# Patient Record
Sex: FEMALE | Race: WHITE
[De-identification: names, ages, dates, MRNs, and addresses within clinical notes are randomized per-mention and may not be internally consistent; named-entity substitution may affect disease eponyms.]

---

## 2018-05-11 ENCOUNTER — HOSPITAL ENCOUNTER (EMERGENCY)
Dept: HOSPITAL 47 - EC | Age: 68
Discharge: HOME | End: 2018-05-11
Payer: MEDICARE

## 2018-05-11 VITALS
SYSTOLIC BLOOD PRESSURE: 143 MMHG | TEMPERATURE: 98.9 F | HEART RATE: 92 BPM | RESPIRATION RATE: 20 BRPM | DIASTOLIC BLOOD PRESSURE: 87 MMHG

## 2018-05-11 DIAGNOSIS — Z87.891: ICD-10-CM

## 2018-05-11 DIAGNOSIS — Z79.82: ICD-10-CM

## 2018-05-11 DIAGNOSIS — Z88.1: ICD-10-CM

## 2018-05-11 DIAGNOSIS — Z95.818: ICD-10-CM

## 2018-05-11 DIAGNOSIS — Z79.899: ICD-10-CM

## 2018-05-11 DIAGNOSIS — Z79.84: ICD-10-CM

## 2018-05-11 DIAGNOSIS — I25.10: ICD-10-CM

## 2018-05-11 DIAGNOSIS — B02.9: Primary | ICD-10-CM

## 2018-05-11 DIAGNOSIS — Z95.5: ICD-10-CM

## 2018-05-11 PROCEDURE — 99282 EMERGENCY DEPT VISIT SF MDM: CPT

## 2018-05-11 NOTE — ED
Skin/Abscess/FB HPI





- General


Chief complaint: Skin/Abscess/Foreign Body


Stated complaint: Rash


Time Seen by Provider: 05/11/18 14:42


Source: patient


Mode of arrival: ambulatory


Limitations: no limitations





- History of Present Illness


Initial comments: 





Patient presents with a rash on the left side of her chest and back.  It has 

gotten worse for couple days it is uncomfortable.  She has taken no medication 

this.  She has no fever or chills.  It does not occur anywhere else.





- Related Data


 Home Medications











 Medication  Instructions  Recorded  Confirmed


 


Aspirin EC [Ecotrin] 325 mg PO DAILY 05/11/18 05/11/18


 


Lisinopril [Zestril] 5 mg PO DAILY 05/11/18 05/11/18


 


Metoprolol Tartrate [Lopressor] 25 mg PO BID 05/11/18 05/11/18


 


diphenhydrAMINE HCL [Benadryl] 25 mg PO BID PRN 05/11/18 05/11/18


 


metFORMIN HCL 1,000 mg PO BID 05/11/18 05/11/18








 Previous Rx's











 Medication  Instructions  Recorded


 


Acyclovir [Zovirax] 800 mg PO TID #30 tab 05/11/18











 Allergies











Allergy/AdvReac Type Severity Reaction Status Date / Time


 


tetracycline Allergy  Rash/Hives Verified 05/11/18 14:48














Review of Systems


ROS Statement: 


Those systems with pertinent positive or pertinent negative responses have been 

documented in the HPI.





ROS Other: All systems not noted in ROS Statement are negative.





Past Medical History


Past Medical History: Coronary Artery Disease (CAD), Chest Pain / Angina


History of Any Multi-Drug Resistant Organisms: None Reported


Past Surgical History: Heart Catheterization, Heart Catheterization With Stent


Past Psychological History: No Psychological Hx Reported


Smoking Status: Former smoker


Past Alcohol Use History: None Reported


Past Drug Use History: None Reported





General Exam


Limitations: no limitations


General appearance: alert, in no apparent distress


Head exam: Present: atraumatic


Eye exam: Present: normal appearance


Neck exam: Present: normal inspection


Respiratory exam: Absent: respiratory distress


Extremities exam: Present: normal inspection, full ROM


Neurological exam: Present: alert, oriented X3


Psychiatric exam: Present: normal affect


Skin exam: Present: other (Shingles rash on the left chest and back)





Course





 Vital Signs











  05/11/18





  14:32


 


Temperature 98.9 F


 


Pulse Rate 92


 


Respiratory 20





Rate 


 


Blood Pressure 143/87


 


O2 Sat by Pulse 97





Oximetry 














Medical Decision Making





- Medical Decision Making





Patient presents with a rash.  It appears to be shingles.  She will be treated 

with pain medication and acyclovir.  She is stable for discharge and outpatient 

follow-up.





Disposition


Clinical Impression: 


 Shingles





Disposition: HOME SELF-CARE


Condition: Good


Instructions:  Shingles (ED)


Prescriptions: 


Acyclovir [Zovirax] 800 mg PO TID #30 tab


Is patient prescribed a controlled substance at d/c from ED?: No


Referrals: 


None,Stated [Primary Care Provider] - 1-2 days


Time of Disposition: 14:55

## 2019-01-13 ENCOUNTER — HOSPITAL ENCOUNTER (INPATIENT)
Dept: HOSPITAL 47 - EC | Age: 69
LOS: 4 days | Discharge: HOME | DRG: 280 | End: 2019-01-17
Attending: HOSPITALIST | Admitting: HOSPITALIST
Payer: MEDICARE

## 2019-01-13 VITALS — BODY MASS INDEX: 32.9 KG/M2

## 2019-01-13 DIAGNOSIS — T50.8X5A: ICD-10-CM

## 2019-01-13 DIAGNOSIS — I13.0: ICD-10-CM

## 2019-01-13 DIAGNOSIS — Z95.5: ICD-10-CM

## 2019-01-13 DIAGNOSIS — R32: ICD-10-CM

## 2019-01-13 DIAGNOSIS — Z79.899: ICD-10-CM

## 2019-01-13 DIAGNOSIS — I21.4: Primary | ICD-10-CM

## 2019-01-13 DIAGNOSIS — Z82.49: ICD-10-CM

## 2019-01-13 DIAGNOSIS — E11.51: ICD-10-CM

## 2019-01-13 DIAGNOSIS — I25.5: ICD-10-CM

## 2019-01-13 DIAGNOSIS — I25.10: ICD-10-CM

## 2019-01-13 DIAGNOSIS — N14.1: ICD-10-CM
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
## 2019-01-13 DIAGNOSIS — E66.9: ICD-10-CM

## 2019-01-13 DIAGNOSIS — Z88.1: ICD-10-CM

## 2019-01-13 DIAGNOSIS — E87.2: ICD-10-CM

## 2019-01-13 DIAGNOSIS — K80.20: ICD-10-CM

## 2019-01-13 DIAGNOSIS — I82.813: ICD-10-CM

## 2019-01-13 DIAGNOSIS — N18.3: ICD-10-CM

## 2019-01-13 DIAGNOSIS — I50.23: ICD-10-CM

## 2019-01-13 DIAGNOSIS — Z79.82: ICD-10-CM

## 2019-01-13 DIAGNOSIS — F17.201: ICD-10-CM

## 2019-01-13 DIAGNOSIS — Z79.84: ICD-10-CM

## 2019-01-13 DIAGNOSIS — E11.22: ICD-10-CM

## 2019-01-13 LAB
ALBUMIN SERPL-MCNC: 3.8 G/DL (ref 3.5–5)
ALP SERPL-CCNC: 89 U/L (ref 38–126)
ALT SERPL-CCNC: 18 U/L (ref 9–52)
ANION GAP SERPL CALC-SCNC: 9 MMOL/L
APTT BLD: 21.7 SEC (ref 22–30)
AST SERPL-CCNC: 17 U/L (ref 14–36)
BASOPHILS # BLD AUTO: 0.1 K/UL (ref 0–0.2)
BASOPHILS NFR BLD AUTO: 1 %
BUN SERPL-SCNC: 27 MG/DL (ref 7–17)
CALCIUM SPEC-MCNC: 9.4 MG/DL (ref 8.4–10.2)
CHLORIDE SERPL-SCNC: 108 MMOL/L (ref 98–107)
CK SERPL-CCNC: 56 U/L (ref 30–135)
CO2 SERPL-SCNC: 21 MMOL/L (ref 22–30)
EOSINOPHIL # BLD AUTO: 0.2 K/UL (ref 0–0.7)
EOSINOPHIL NFR BLD AUTO: 2 %
ERYTHROCYTE [DISTWIDTH] IN BLOOD BY AUTOMATED COUNT: 3.58 M/UL (ref 3.8–5.4)
ERYTHROCYTE [DISTWIDTH] IN BLOOD: 14.8 % (ref 11.5–15.5)
GLUCOSE SERPL-MCNC: 156 MG/DL (ref 74–99)
HCT VFR BLD AUTO: 31.7 % (ref 34–46)
HGB BLD-MCNC: 10.2 GM/DL (ref 11.4–16)
INR PPP: 1 (ref ?–1.2)
LYMPHOCYTES # SPEC AUTO: 1.5 K/UL (ref 1–4.8)
LYMPHOCYTES NFR SPEC AUTO: 20 %
MCH RBC QN AUTO: 28.4 PG (ref 25–35)
MCHC RBC AUTO-ENTMCNC: 32.2 G/DL (ref 31–37)
MCV RBC AUTO: 88.3 FL (ref 80–100)
MONOCYTES # BLD AUTO: 0.4 K/UL (ref 0–1)
MONOCYTES NFR BLD AUTO: 6 %
NEUTROPHILS # BLD AUTO: 5.2 K/UL (ref 1.3–7.7)
NEUTROPHILS NFR BLD AUTO: 68 %
PLATELET # BLD AUTO: 311 K/UL (ref 150–450)
POTASSIUM SERPL-SCNC: 5 MMOL/L (ref 3.5–5.1)
PROT SERPL-MCNC: 7.1 G/DL (ref 6.3–8.2)
PT BLD: 10.4 SEC (ref 9–12)
SODIUM SERPL-SCNC: 138 MMOL/L (ref 137–145)
TROPONIN I SERPL-MCNC: 0.61 NG/ML (ref 0–0.03)
WBC # BLD AUTO: 7.6 K/UL (ref 3.8–10.6)

## 2019-01-13 PROCEDURE — 82553 CREATINE MB FRACTION: CPT

## 2019-01-13 PROCEDURE — 83550 IRON BINDING TEST: CPT

## 2019-01-13 PROCEDURE — B2111ZZ FLUOROSCOPY OF MULTIPLE CORONARY ARTERIES USING LOW OSMOLAR CONTRAST: ICD-10-PCS

## 2019-01-13 PROCEDURE — 85730 THROMBOPLASTIN TIME PARTIAL: CPT

## 2019-01-13 PROCEDURE — 80061 LIPID PANEL: CPT

## 2019-01-13 PROCEDURE — 81001 URINALYSIS AUTO W/SCOPE: CPT

## 2019-01-13 PROCEDURE — 93458 L HRT ARTERY/VENTRICLE ANGIO: CPT

## 2019-01-13 PROCEDURE — 86920 COMPATIBILITY TEST SPIN: CPT

## 2019-01-13 PROCEDURE — 80074 ACUTE HEPATITIS PANEL: CPT

## 2019-01-13 PROCEDURE — 83036 HEMOGLOBIN GLYCOSYLATED A1C: CPT

## 2019-01-13 PROCEDURE — 87070 CULTURE OTHR SPECIMN AEROBIC: CPT

## 2019-01-13 PROCEDURE — 93880 EXTRACRANIAL BILAT STUDY: CPT

## 2019-01-13 PROCEDURE — 83880 ASSAY OF NATRIURETIC PEPTIDE: CPT

## 2019-01-13 PROCEDURE — 86901 BLOOD TYPING SEROLOGIC RH(D): CPT

## 2019-01-13 PROCEDURE — 4A023N7 MEASUREMENT OF CARDIAC SAMPLING AND PRESSURE, LEFT HEART, PERCUTANEOUS APPROACH: ICD-10-PCS

## 2019-01-13 PROCEDURE — 94150 VITAL CAPACITY TEST: CPT

## 2019-01-13 PROCEDURE — 84443 ASSAY THYROID STIM HORMONE: CPT

## 2019-01-13 PROCEDURE — 36415 COLL VENOUS BLD VENIPUNCTURE: CPT

## 2019-01-13 PROCEDURE — 93970 EXTREMITY STUDY: CPT

## 2019-01-13 PROCEDURE — 84484 ASSAY OF TROPONIN QUANT: CPT

## 2019-01-13 PROCEDURE — 83735 ASSAY OF MAGNESIUM: CPT

## 2019-01-13 PROCEDURE — 96375 TX/PRO/DX INJ NEW DRUG ADDON: CPT

## 2019-01-13 PROCEDURE — 80048 BASIC METABOLIC PNL TOTAL CA: CPT

## 2019-01-13 PROCEDURE — 85379 FIBRIN DEGRADATION QUANT: CPT

## 2019-01-13 PROCEDURE — 93005 ELECTROCARDIOGRAM TRACING: CPT

## 2019-01-13 PROCEDURE — 85610 PROTHROMBIN TIME: CPT

## 2019-01-13 PROCEDURE — 82728 ASSAY OF FERRITIN: CPT

## 2019-01-13 PROCEDURE — 83540 ASSAY OF IRON: CPT

## 2019-01-13 PROCEDURE — 96365 THER/PROPH/DIAG IV INF INIT: CPT

## 2019-01-13 PROCEDURE — 76770 US EXAM ABDO BACK WALL COMP: CPT

## 2019-01-13 PROCEDURE — 99285 EMERGENCY DEPT VISIT HI MDM: CPT

## 2019-01-13 PROCEDURE — 93930 UPPER EXTREMITY STUDY: CPT

## 2019-01-13 PROCEDURE — 94760 N-INVAS EAR/PLS OXIMETRY 1: CPT

## 2019-01-13 PROCEDURE — 71046 X-RAY EXAM CHEST 2 VIEWS: CPT

## 2019-01-13 PROCEDURE — 86900 BLOOD TYPING SEROLOGIC ABO: CPT

## 2019-01-13 PROCEDURE — 85025 COMPLETE CBC W/AUTO DIFF WBC: CPT

## 2019-01-13 PROCEDURE — 93923 UPR/LXTR ART STDY 3+ LVLS: CPT

## 2019-01-13 PROCEDURE — 86850 RBC ANTIBODY SCREEN: CPT

## 2019-01-13 PROCEDURE — 87086 URINE CULTURE/COLONY COUNT: CPT

## 2019-01-13 PROCEDURE — 82550 ASSAY OF CK (CPK): CPT

## 2019-01-13 PROCEDURE — 93306 TTE W/DOPPLER COMPLETE: CPT

## 2019-01-13 PROCEDURE — 80053 COMPREHEN METABOLIC PANEL: CPT

## 2019-01-13 RX ADMIN — METOPROLOL TARTRATE SCH MG: 25 TABLET, FILM COATED ORAL at 21:39

## 2019-01-13 RX ADMIN — FUROSEMIDE SCH: 10 INJECTION, SOLUTION INTRAMUSCULAR; INTRAVENOUS at 20:54

## 2019-01-13 NOTE — ED
SOB HPI





<César Prieto - Last Filed: 19 20:09>





- General


Source: patient


Mode of arrival: wheelchair


Limitations: no limitations





<Inna Turner - Last Filed: 19 20:46>





- General


Chief Complaint: Shortness of Breath


Stated Complaint: FELIPE


Time Seen by Provider: 19 17:56





- History of Present Illness


Initial Comments: 


68-year-old female patient presents to the emergency department today for 

complaints of progressive dyspnea worsening over the last week.  Patient states 

that she becomes very short of breath with any activity.  Patient states shot 

to rest for a period of time afterward in order to catch her breath.  Patient 

states this also worsens when she lies flat.  Patient states her heart starts 

racing and she feels anxious like she is not getting enough air.  Patient 

denies any chest pain or discomfort with this.  Denies any nausea, vomiting, 

sweats, or cough.  Denies any fevers or chills.  Patient does have history of 

myocardial infarction in , she does have 3 coronary stents.  Patient does 

admit to having vascular issues and has had surgery on her legs numerous times 

in the past. Patient denies any recent rash, abdominal pain, diarrhea, 

constipation, back pain, numbness, tingling, dizziness, weakness, hematuria, 

dysuria, urinary urgency, urinary frequency, headache, visual changes, or any 

other complaints.


 (Inna Turner)





- Related Data


 Home Medications











 Medication  Instructions  Recorded  Confirmed


 


Aspirin EC [Ecotrin] 325 mg PO DAILY 18


 


Lisinopril [Zestril] 5 mg PO DAILY 18


 


Metoprolol Tartrate [Lopressor] 25 mg PO BID 18


 


metFORMIN HCL 1,000 mg PO BID 18


 


Glimepiride [Amaryl] 2 mg PO DAILY 19


 


Oxybutynin Chloride [Oxybutynin 10 mg PO DAILY 19





Chloride ER]   











 Allergies











Allergy/AdvReac Type Severity Reaction Status Date / Time


 


tetracycline Allergy  Rash/Hives Verified 19 19:00














Review of Systems


ROS Other: All systems not noted in ROS Statement are negative.





<César Prieto - Last Filed: 19 20:09>


ROS Other: All systems not noted in ROS Statement are negative.





<Inna Turner - Last Filed: 19 20:46>


ROS Statement: 


Those systems with pertinent positive or pertinent negative responses have been 

documented in the HPI.








Past Medical History


Past Medical History: Coronary Artery Disease (CAD), Chest Pain / Angina, 

Myocardial Infarction (MI)


History of Any Multi-Drug Resistant Organisms: None Reported


Past Surgical History:  Section, Heart Catheterization, Heart 

Catheterization With Stent


Additional Past Surgical History / Comment(s): vein work, leg stent


Past Psychological History: No Psychological Hx Reported


Smoking Status: Former smoker


Past Alcohol Use History: None Reported


Past Drug Use History: None Reported





<Inna Turner - Last Filed: 19 20:46>





General Exam


Limitations: no limitations


General appearance: alert, in no apparent distress, other (Physical well-

developed, well-nourished adult female patient in no acute distress.  Vital 

signs upon presentation are temperature 98.2 degrees rectal pulse 83, 

respirations 18, blood pressure 143/88, pulse ox 96% on room air.)


Eye exam: Present: normal appearance, PERRL, EOMI.  Absent: scleral icterus, 

conjunctival injection, periorbital swelling


ENT exam: Present: normal exam, normal oropharynx, mucous membranes moist


Respiratory exam: Present: other (Crackles at bilateral bases).  Absent: normal 

lung sounds bilaterally, respiratory distress, wheezes, rales, rhonchi, stridor


Cardiovascular Exam: Present: regular rate, normal rhythm, normal heart sounds.

  Absent: systolic murmur, diastolic murmur, rubs, gallop, clicks


GI/Abdominal exam: Present: soft, normal bowel sounds.  Absent: distended, 

tenderness, guarding, rebound, rigid


Extremities exam: Present: full ROM, normal capillary refill, other (1+ pitting 

edema to the bilateral lower extremities).  Absent: normal inspection, 

tenderness, pedal edema, joint swelling, calf tenderness


Neurological exam: Present: alert, oriented X3, CN II-XII intact


Psychiatric exam: Present: normal affect, normal mood


Skin exam: Present: warm, dry, intact, normal color.  Absent: rash





<Inna Turner - Last Filed: 19 20:46>


 Vital Signs











  19





  17:46 18:30 19:00


 


Temperature 98.2 F  


 


Pulse Rate 83 79 74


 


Respiratory 18 22 18





Rate   


 


Blood Pressure 143/88 131/80 130/71


 


O2 Sat by Pulse 96 97 95





Oximetry   














  19





  19:30 20:00


 


Temperature  


 


Pulse Rate 92 89


 


Respiratory  





Rate  


 


Blood Pressure 118/71 133/83


 


O2 Sat by Pulse 96 95





Oximetry  














Medical Decision Making





- Lab Data


Result diagrams: 


 19 18:07





 19 18:07





<César Prieto - Last Filed: 19 20:09>





- Lab Data


Result diagrams: 


 19 18:07





 19 18:07





- EKG Data


-: EKG Interpreted by Me





- Radiology Data


Radiology results: report reviewed, image reviewed





<YueInna - Last Filed: 19 20:46>





- Medical Decision Making





Medical decision making; this is a 68-year-old female here with family.  I 

examine the patient.  The patient's been having progressive shortness of breath 

over several days to a week.  Patient denies any chest pain but short of 

breath.  This increases with walking, talking and laying flat.  She's also 

noticed some lower leg edema.  Past medical history significant for having had 

an MI 13 years ago with   3 stents placed.  The patient states it does not feel 

like a heart attack.  Her labs show  a BNP of 3500 with a troponin of 0.606.  

The patient's going to be admitted to Dr. King.  I discussed the case with on

-call cardiologist Dr. Tomas.  He will follow-up in hospital.  He recommends 

heparin   be started.  EKG showed normal sinus rhythm no acute ST elevation no 

ectopy.  Dr. Prieto (César Prieto)


60-year-old female patient presented to the emergency department today for 

evaluation of progressive shortness of breath over the last week.  Physical 

examination did reveal crackles at the bilateral lung bases.  Chest x-ray 

showed bilateral pleural effusion with some pulmonary vascular congestion.  

Labs reviewed and did reveal elevated troponin at 0.606, elevated BNP at 3500.  

We will admit patient to Dr. King.  My attending Dr. Prieto did discuss to 

the on-call cardiologist Dr. Tomas who recommends starting heparin.  We will 

obtain echo in the morning.  She'll be admitted for further evaluation


 (Inna Turner)





- Lab Data


 Lab Results











  19 Range/Units





  18:07 18:07 18:07 


 


WBC   7.6   (3.8-10.6)  k/uL


 


RBC   3.58 L   (3.80-5.40)  m/uL


 


Hgb   10.2 L   (11.4-16.0)  gm/dL


 


Hct   31.7 L   (34.0-46.0)  %


 


MCV   88.3   (80.0-100.0)  fL


 


MCH   28.4   (25.0-35.0)  pg


 


MCHC   32.2   (31.0-37.0)  g/dL


 


RDW   14.8   (11.5-15.5)  %


 


Plt Count   311   (150-450)  k/uL


 


Neutrophils %   68   %


 


Lymphocytes %   20   %


 


Monocytes %   6   %


 


Eosinophils %   2   %


 


Basophils %   1   %


 


Neutrophils #   5.2   (1.3-7.7)  k/uL


 


Lymphocytes #   1.5   (1.0-4.8)  k/uL


 


Monocytes #   0.4   (0-1.0)  k/uL


 


Eosinophils #   0.2   (0-0.7)  k/uL


 


Basophils #   0.1   (0-0.2)  k/uL


 


PT     (9.0-12.0)  sec


 


INR     (<1.2)  


 


APTT     (22.0-30.0)  sec


 


Sodium    138  (137-145)  mmol/L


 


Potassium    5.0  (3.5-5.1)  mmol/L


 


Chloride    108 H  ()  mmol/L


 


Carbon Dioxide    21 L  (22-30)  mmol/L


 


Anion Gap    9  mmol/L


 


BUN    27 H  (7-17)  mg/dL


 


Creatinine    1.32 H  (0.52-1.04)  mg/dL


 


Est GFR (CKD-EPI)AfAm    48  (>60 ml/min/1.73 sqM)  


 


Est GFR (CKD-EPI)NonAf    42  (>60 ml/min/1.73 sqM)  


 


Glucose    156 H  (74-99)  mg/dL


 


Calcium    9.4  (8.4-10.2)  mg/dL


 


Total Bilirubin    0.4  (0.2-1.3)  mg/dL


 


AST    17  (14-36)  U/L


 


ALT    18  (9-52)  U/L


 


Alkaline Phosphatase    89  ()  U/L


 


Total Creatine Kinase  56    ()  U/L


 


CK-MB (CK-2)  0.9    (0.0-2.4)  ng/mL


 


CK-MB (CK-2) Rel Index  1.6    


 


Troponin I  0.606 H*    (0.000-0.034)  ng/mL


 


NT-Pro-B Natriuret Pep     pg/mL


 


Total Protein    7.1  (6.3-8.2)  g/dL


 


Albumin    3.8  (3.5-5.0)  g/dL














  19 Range/Units





  18:07 18:07 


 


WBC    (3.8-10.6)  k/uL


 


RBC    (3.80-5.40)  m/uL


 


Hgb    (11.4-16.0)  gm/dL


 


Hct    (34.0-46.0)  %


 


MCV    (80.0-100.0)  fL


 


MCH    (25.0-35.0)  pg


 


MCHC    (31.0-37.0)  g/dL


 


RDW    (11.5-15.5)  %


 


Plt Count    (150-450)  k/uL


 


Neutrophils %    %


 


Lymphocytes %    %


 


Monocytes %    %


 


Eosinophils %    %


 


Basophils %    %


 


Neutrophils #    (1.3-7.7)  k/uL


 


Lymphocytes #    (1.0-4.8)  k/uL


 


Monocytes #    (0-1.0)  k/uL


 


Eosinophils #    (0-0.7)  k/uL


 


Basophils #    (0-0.2)  k/uL


 


PT   10.4  (9.0-12.0)  sec


 


INR   1.0  (<1.2)  


 


APTT   21.7 L  (22.0-30.0)  sec


 


Sodium    (137-145)  mmol/L


 


Potassium    (3.5-5.1)  mmol/L


 


Chloride    ()  mmol/L


 


Carbon Dioxide    (22-30)  mmol/L


 


Anion Gap    mmol/L


 


BUN    (7-17)  mg/dL


 


Creatinine    (0.52-1.04)  mg/dL


 


Est GFR (CKD-EPI)AfAm    (>60 ml/min/1.73 sqM)  


 


Est GFR (CKD-EPI)NonAf    (>60 ml/min/1.73 sqM)  


 


Glucose    (74-99)  mg/dL


 


Calcium    (8.4-10.2)  mg/dL


 


Total Bilirubin    (0.2-1.3)  mg/dL


 


AST    (14-36)  U/L


 


ALT    (9-52)  U/L


 


Alkaline Phosphatase    ()  U/L


 


Total Creatine Kinase    ()  U/L


 


CK-MB (CK-2)    (0.0-2.4)  ng/mL


 


CK-MB (CK-2) Rel Index    


 


Troponin I    (0.000-0.034)  ng/mL


 


NT-Pro-B Natriuret Pep  3570   pg/mL


 


Total Protein    (6.3-8.2)  g/dL


 


Albumin    (3.5-5.0)  g/dL














- EKG Data


EKG Comments: 





EKG obtained at 1757 shows normal sinus rhythm with a ventricular rate of 86, 

OR interval 142, QRS duration 82, , . No evidence of ST elevation 

or depression. (Inna Turner)





- Radiology Data


Two-view x-ray of the chest is obtained.  Report was reviewed in its entirety.  

Impression by Dr. Madison shows small to moderate bilateral pleural effusions.  

Minimal pulmonary vascular congestion (Inna Turner)





Disposition





<César Prieto - Last Filed: 19 20:09>


Decision to Admit Reason: Admit from EC


Decision Date: 19


Decision Time: 20:45





<Inna Turner - Last Filed: 19 20:46>


Clinical Impression: 


 Congestive heart failure, ACS (acute coronary syndrome)





Disposition: ADMITTED AS IP TO THIS [facility]


Condition: Serious


Referrals: 


Rashad Stephens DO [Primary Care Provider] - 1-2 days

## 2019-01-13 NOTE — XR
EXAMINATION TYPE: XR chest 2V

 

DATE OF EXAM: 1/13/2019

 

COMPARISON: NONE

 

HISTORY: Dyspnea with exertion

 

TECHNIQUE:  Frontal and lateral views of the chest are obtained.

 

FINDINGS:  Small to moderate-sized bilateral pleural effusions with associated bibasilar atelectasis.
 Increased interstitial markings are seen diffusely with cephalization of the pulmonary vessels. The 
heart is mildly enlarged. No pneumothorax. Osseous structures appear intact.

 

IMPRESSION:  

1. Small to moderate bilateral pleural effusions.

2. Minimal pulmonary vascular congestion.

## 2019-01-14 LAB
ALBUMIN SERPL-MCNC: 3.6 G/DL (ref 3.5–5)
ALP SERPL-CCNC: 89 U/L (ref 38–126)
ALT SERPL-CCNC: 20 U/L (ref 9–52)
ANION GAP SERPL CALC-SCNC: 6 MMOL/L
APTT BLD: 42.4 SEC (ref 22–30)
AST SERPL-CCNC: 15 U/L (ref 14–36)
BASOPHILS # BLD AUTO: 0 K/UL (ref 0–0.2)
BASOPHILS # BLD AUTO: 0.1 K/UL (ref 0–0.2)
BASOPHILS NFR BLD AUTO: 1 %
BASOPHILS NFR BLD AUTO: 1 %
BUN SERPL-SCNC: 27 MG/DL (ref 7–17)
CALCIUM SPEC-MCNC: 9.3 MG/DL (ref 8.4–10.2)
CHLORIDE SERPL-SCNC: 109 MMOL/L (ref 98–107)
CHOLEST SERPL-MCNC: 236 MG/DL (ref ?–200)
CO2 SERPL-SCNC: 24 MMOL/L (ref 22–30)
D DIMER PPP FEU-MCNC: 1.13 MG/L FEU (ref ?–0.6)
EOSINOPHIL # BLD AUTO: 0.2 K/UL (ref 0–0.7)
EOSINOPHIL # BLD AUTO: 0.2 K/UL (ref 0–0.7)
EOSINOPHIL NFR BLD AUTO: 4 %
EOSINOPHIL NFR BLD AUTO: 4 %
ERYTHROCYTE [DISTWIDTH] IN BLOOD BY AUTOMATED COUNT: 3.15 M/UL (ref 3.8–5.4)
ERYTHROCYTE [DISTWIDTH] IN BLOOD BY AUTOMATED COUNT: 3.57 M/UL (ref 3.8–5.4)
ERYTHROCYTE [DISTWIDTH] IN BLOOD: 14.7 % (ref 11.5–15.5)
ERYTHROCYTE [DISTWIDTH] IN BLOOD: 14.7 % (ref 11.5–15.5)
GLUCOSE BLD-MCNC: 121 MG/DL (ref 75–99)
GLUCOSE BLD-MCNC: 122 MG/DL (ref 75–99)
GLUCOSE BLD-MCNC: 142 MG/DL (ref 75–99)
GLUCOSE BLD-MCNC: 160 MG/DL (ref 75–99)
GLUCOSE SERPL-MCNC: 117 MG/DL (ref 74–99)
HBA1C MFR BLD: 7.1 % (ref 4–6)
HCT VFR BLD AUTO: 28.1 % (ref 34–46)
HCT VFR BLD AUTO: 32.2 % (ref 34–46)
HDLC SERPL-MCNC: 35 MG/DL (ref 40–60)
HGB BLD-MCNC: 10.4 GM/DL (ref 11.4–16)
HGB BLD-MCNC: 9 GM/DL (ref 11.4–16)
INR PPP: 1 (ref ?–1.2)
LDLC SERPL CALC-MCNC: 175 MG/DL (ref 0–99)
LYMPHOCYTES # SPEC AUTO: 1.3 K/UL (ref 1–4.8)
LYMPHOCYTES # SPEC AUTO: 1.4 K/UL (ref 1–4.8)
LYMPHOCYTES NFR SPEC AUTO: 25 %
LYMPHOCYTES NFR SPEC AUTO: 27 %
MAGNESIUM SPEC-SCNC: 1.7 MG/DL (ref 1.6–2.3)
MCH RBC QN AUTO: 28.5 PG (ref 25–35)
MCH RBC QN AUTO: 29 PG (ref 25–35)
MCHC RBC AUTO-ENTMCNC: 32 G/DL (ref 31–37)
MCHC RBC AUTO-ENTMCNC: 32.2 G/DL (ref 31–37)
MCV RBC AUTO: 89.1 FL (ref 80–100)
MCV RBC AUTO: 90.1 FL (ref 80–100)
MONOCYTES # BLD AUTO: 0.3 K/UL (ref 0–1)
MONOCYTES # BLD AUTO: 0.4 K/UL (ref 0–1)
MONOCYTES NFR BLD AUTO: 6 %
MONOCYTES NFR BLD AUTO: 7 %
NEUTROPHILS # BLD AUTO: 3.2 K/UL (ref 1.3–7.7)
NEUTROPHILS # BLD AUTO: 3.2 K/UL (ref 1.3–7.7)
NEUTROPHILS NFR BLD AUTO: 60 %
NEUTROPHILS NFR BLD AUTO: 61 %
PH UR: 5 [PH] (ref 5–8)
PLATELET # BLD AUTO: 259 K/UL (ref 150–450)
PLATELET # BLD AUTO: 262 K/UL (ref 150–450)
POTASSIUM SERPL-SCNC: 4.7 MMOL/L (ref 3.5–5.1)
PROT SERPL-MCNC: 6.7 G/DL (ref 6.3–8.2)
PROT UR QL: (no result)
PT BLD: 10.4 SEC (ref 9–12)
SODIUM SERPL-SCNC: 139 MMOL/L (ref 137–145)
SP GR UR: 1.01 (ref 1–1.03)
SQUAMOUS UR QL AUTO: 1 /HPF (ref 0–4)
TRIGL SERPL-MCNC: 132 MG/DL (ref ?–150)
UROBILINOGEN UR QL STRIP: <2 MG/DL (ref ?–2)
WBC # BLD AUTO: 5.3 K/UL (ref 3.8–10.6)
WBC # BLD AUTO: 5.3 K/UL (ref 3.8–10.6)
WBC #/AREA URNS HPF: <1 /HPF (ref 0–5)

## 2019-01-14 RX ADMIN — INSULIN ASPART SCH UNIT: 100 INJECTION, SOLUTION INTRAVENOUS; SUBCUTANEOUS at 20:47

## 2019-01-14 RX ADMIN — FUROSEMIDE SCH MG: 10 INJECTION, SOLUTION INTRAMUSCULAR; INTRAVENOUS at 20:40

## 2019-01-14 RX ADMIN — FUROSEMIDE SCH MG: 10 INJECTION, SOLUTION INTRAMUSCULAR; INTRAVENOUS at 08:47

## 2019-01-14 RX ADMIN — METOPROLOL TARTRATE SCH MG: 50 TABLET, FILM COATED ORAL at 20:40

## 2019-01-14 RX ADMIN — METOPROLOL TARTRATE SCH MG: 25 TABLET, FILM COATED ORAL at 08:47

## 2019-01-14 RX ADMIN — LISINOPRIL SCH MG: 5 TABLET ORAL at 08:47

## 2019-01-14 RX ADMIN — OXYBUTYNIN CHLORIDE SCH: 10 TABLET, EXTENDED RELEASE ORAL at 11:20

## 2019-01-14 NOTE — US
EXAMINATION TYPE: US carotid duplex BILAT

 

DATE OF EXAM: 1/14/2019

 

COMPARISON: NONE

 

CLINICAL HISTORY: PreOp Cardiac Surgery. no h/o stroke

 

EXAM MEASUREMENTS: 

 

RIGHT:  Peak Systolic Velocity (PSV) cm/sec

----- Right CCA:  90.4  

----- Right ICA:  93.8     

----- Right ECA:  105.1   

ICA/CCA ratio:  1.0    

 

RIGHT:  End Diastole cm/sec

----- Right CCA:  23.9   

----- Right ICA:  39.9      

----- Right ECA:  21.6     

 

LEFT:  Peak Systolic Velocity (PSV) cm/sec

----- Left CCA:  77.9  

----- Left ICA:  96.0   

----- Left ECA:  110.6  

ICA/CCA ratio:  1.2  

 

LEFT:  End Diastole cm/sec

----- Left CCA:  18.6  

----- Left ICA:  31.1   

----- Left ECA:  10.6 

 

VERTEBRALS (direction of flow):

Right Vertebral: Antegrade

Left Vertebral: Antegrade

 

Rhythm:  Arrhythmia

 

Heterogeneous plaque seen at bilateral bulbs with no significant stenosis seen.

 

 

 

IMPRESSION:  

1. Although there is no sonographic evidence of hemodynamically significant stenosis there is moderat
e heterogenous plaque within the bilateral carotid bulbs that could be further assessed with CTA neck
.

2. Incidentally noted cardiac arrhythmia. Correlate with EKG.   

 

 

Criteria for Assigning % of Stenosis / Diameter reduction

(Estimation based on the indirect measurements of the internal carotid artery velocities (ICA PSV).

1.  Normal (no stenosis)=ICA PSV < 125 cm/s: ratio < 2.0: ICA EDV<40 cm/s.

2. Less than 50% stenosis=ICA PSV < 125 cm/s: ratio < 2.0: ICA EDV<40 cm/s.

3.  50 to 69% stenosis=ICA PSV of 125 to 230 cm/s: ration 2.0 ? 4.0: ICA EDV  cm/s.

4.  Greater than 70% stenosis to near occlusion= ICA PSV > 230 cm/s: ratio > 4.0: ICA EDV > 100 cm/s.
 

5.  Near occlusion= ICA PSV velocities may be low or undetectable: variable ratio and ICA EDV.

6.  Total occlusion=unable to detect flow.

## 2019-01-14 NOTE — HP
HISTORY AND PHYSICAL



DATE OF ADMISSION:

January 31, 2019.



DATE OF SERVICE:

January 14, 2019.



PRESENTING COMPLAINT:

Short of breath.



HISTORY OF PRESENTING COMPLAINT:

This is a pleasant 68 -year-old patient of Dr. Stephens's whose chronic stable medical

conditions include coronary artery disease with stent in 2006, diabetes, urine

incontinence, and peripheral artery disease.  The patient for 10 days has been

progressively getting more and more short of breath, more so in the last 4 days,

worsening edema.  Slight cough.  No sputum.  No fever.  No chills.  Some orthopnea.  No

chest pressure.  Patient presented to the ER.  The patient's troponins were 0.6, 0.4.

The patient was seen by Dr. Tomas.  He felt the patient may have had a non-ST elevation

myocardial infarction.  Took the patient down to cardiac cath.  The patient was found

to have triple-vessel disease and cardiothoracic surgery was consulted.  Family is

present at the bedside.



REVIEW OF SYSTEMS:

CONSTITUTIONAL: None.

HEENT None.

RESPIRATORY as above. Cardiovascular as above.  GASTROINTESTINAL:  None.

GENITOURINARY:  Urinary incontinence.

MUSCULOSKELETAL:  None.

DERMATOLOGICAL, HEMATOLOGIC, LYMPHATIC: none.

PSYCHIATRY:  None.

NEUROLOGICAL:  None.



PAST MEDICAL HISTORY:

Coronary artery disease with stent, diabetes, hyperlipidemia, peripheral artery

disease.



PAST SURGICAL HISTORY:

Cardiac cath with stent and also stent to the leg.



SOCIAL HISTORY:

He is .  Smoked a pack a day for 40 years stopped in 2006.  Alcohol

occasionally.



FAMILY HISTORY:

Coronary artery disease.



HOME MEDICATIONS:

1. Oxybutynin 10 mg a day.

2. Lopressor 25 mg b.i.d.

3. Amaryl 2 mg p.o. daily.

4. Metformin 1000 mg b.i.d.

5. Zestril 5 mg p.o. daily.

6. Aspirin 325 p.o. daily.



ALLERGIES:

TETRACYCLINE.



PHYSICAL EXAMINATION:

VITAL SIGNS: Temperature 98, pulse 92, respiratory 18, blood pressure 149/88, pulse ox

96% on 2 L.

GENERAL APPEARANCE:  Well built.  BMI 32.9.  Lying in bed, awake.  Eyes:  Pupils equal.

Conjunctivae normal.

HEENT:  External appearance of nose and ears normal.  Oral cavity normal.

NECK:  JVD raised.  Mass not palpable.

RESPIRATORY: Effort increased.

LUNGS: Decreased breath sounds.

CARDIOVASCULAR:  1st and 2nd sounds normal.  Some edema.

ABDOMEN:  Soft, nontender.  Liver and spleen not palpable.

LYMPHATICS:  No lymph nodes palpable in the neck and axilla.

PSYCHIATRY:  Alert and oriented x3. Mood and affect normal.

NEUROLOGICAL:  Pupils equal.  Cranial nerves grossly intact. Power and sensation

grossly intact.



INVESTIGATIONS:

White count 7.6, hemoglobin 10.2, potassium 5.0.  BUN 27, creatinine 1.32.  Troponin

0.6, 0.4, 0.4.  ProBNP 3570, chest x-ray film personally reviewed by me shows

cardiomegaly, pleural effusion, and venous prominence.  EKG tracing personally reviewed

by me shows some Q-waves in the inferior leads and some nonspecific ST-segment changes.

A 2D echocardiogram EF of 20-25 percent, multiple wall motion abnormalities.



ASSESSMENT:

1. Acute congestive heart failure exacerbation from systolic dysfunction, ejection

    fraction 20-25 percent from underlying coronary artery disease.

2. Acute non-Q-wave myocardial infarction.  The patient has been progressively getting

    short of breath for the last 14 days.

3. Severe triple-vessel disease per cardiac catheterization.  Patient will require

    coronary bypass.

4. Peripheral artery disease, prior intervention about 3 years ago.

5. Chronic urinary incontinence.

6. Diabetes mellitus type 2 on oral hypoglycemic.

7. Chronic kidney disease stage III probably from diabetic nephropathy and

    nephrosclerosis.

8. Metabolic acidosis, possibly from renal failure.



PLAN:

The patient is currently on aspirin, Lipitor, IV Lasix, Amaryl, Zestril, Glucophage,

Lopressor, Nitrostat, Ditropan XL.  The patient is seen by Dr. Tomas from Cardiology

and Cardiothoracic surgery was also consulted.  We will also get an opinion from

Nephrology given the renal dysfunction.  We will send off a UA and do a renal

ultrasound.  Care was discussed with the patient.  Questions were answered.  Copy to

Dr. Stephens.





MMODL / IJN: [id number] / Job#: [id number]

## 2019-01-14 NOTE — CONS
KAITY Cifuentes is a 68-year-old lady with history of coronary artery disease, status post prior

myocardial infarction cath and angioplasty, hypertension and non-insulin-dependent

diabetes who presents to hospital complaining of exertional shortness of breath.  She

has been becoming progressively more short of breath over the last several days.  EKG

on admission showed sinus rhythm with nonspecific ST-T wave changes.  She did not have

any episodes of chest pain, but she ruled in for myocardial infarction.  Her troponins

were at 0.6, 0.4 and 0.4.  Her BNP is also elevated.  At the time of my evaluation this

morning, she appears comfortable at rest.  I treated her with IV heparin with some

improvement in her symptoms.  Her hemoglobin on admission was low at 10.2, this morning

it is 9.  There is no prior history of anemia.  Patient does not have any active GI

bleed and does not have melanotic stools.  Her BUN and creatinine are slightly elevated

at 27 and 1.3.  Given the elevated troponin and known CAD, I advised the patient to

undergo invasive angiography to rule out significant obstructive disease.  If this is

negative, then she will need workup for her anemia.  She does not have any active GI

bleed at the moment.



PAST MEDICAL HISTORY:

Significant for hypertension, diabetes.



CURRENT MEDICATIONS:

Include Lopressor 25 b.i.d., Amaryl 2 mg daily, metformin 1000 b.i.d., Zestril 5 q.

daily and aspirin.



ALLERGIC:

To TETRACYCLINE.



FAMILY HISTORY:

Negative for premature coronary artery disease.



SOCIAL HISTORY:

Negative for current smoking, EtOH abuse or drug abuse.



REVIEW OF SYSTEMS:

HEENT is unremarkable.

CARDIAC:  As described above.

RESPIRATORY:  As described above.

GI:  Negative.

GENITOURINARY: Negative.

ALLERGY:  Negative.

SKIN:  Negative.

MUSCULOSKELETAL:  Significant for arthritis.

PSYCHOSOCIAL:  Negative.

ENDOCRINE:  Negative.

HEMATOLOGIC:  Negative.

DERM:  Negative.

CONSTITUTIONAL:  Negative.

ONCOLOGICAL:  Negative.



Rest of the system review is not relevant.



PHYSICAL EXAM:

Comfortable at rest.  Afebrile.  Vital signs are stable.  There is no jugular venous

distention.  Carotid upstroke is normal.  There is no bruit.  Chest exam reveals good

air entry bilaterally.  Heart exam reveals first and second heart sounds.  No gallop.

No murmur.  No rub.  Abdomen is soft, nontender.  Exam of extremities did not reveal

edema.  Peripheral pulses are felt.



LABS:

Showed that the hemoglobin is low at 9, platelet count is 259.  Troponins are elevated.

Potassium is 5, BUN is 27, creatinine is 1.3.  BNP is elevated at 3570.



ASSESSMENT:

1. Acute shortness of breath, probably related to acute non ST-segment elevation

    myocardial infarction.

2. Renal insufficiency.

3. Anemia.



PLAN:

I advised the patient to undergo cardiac catheterization today.  I will obtain a 2D

echo to evaluate her LV function and wall motion.  I am going to review her outpatient

records.  Depending upon the angiographic data, we will decide on further course of

action.  I am also going to obtain a D-dimer on her. \





MMODL / IJN: [id number] / Job#: [id number]

## 2019-01-14 NOTE — P.GSCN
<Rhona Seals - Last Filed: 19 15:11>





History of Present Illness


Consult date: 19


Reason for Consult: 





Severe triple vessel coronary artery disease, surgical recommendations


Requesting physician: Orlando Tomas


History of present illness: 





This is a 68-year-old female patient who follows on an outpatient basis with 

Dr. Luisa Stephens.  She has a previous medical history of myocardial 

infarction in  with stent placement, hyperlipidemia, peripheral artery 

disease with stent placement to the left lower extremity in 2016, recent vein 

surgery to bilateral lower extremities, non-insulin-dependent diabetes mellitus

, shingles, obesity, previous tobacco dependence, and family history of early 

coronary artery disease with her mother dying before the age of 60 years old 

during coronary artery bypass graft surgery.  She presented to [facility] emergency room yesterday with complaints of increasing shortness of breath

, especially with activity over the previous week.  She is unable to lay flat, 

and is significantly short of breath just walking from her couch to the 

bathroom.  Her shortness of breath is partially relieved with rest, in addition 

she endorses bilateral lower extremity edema, intermittent nausea, and 

occasional palpitations with a racing heart.  She denies any chest pain or 

pressure, weakness, dizziness, syncope.  In the emergency room she had EKG 

completed which demonstrated some nonspecific ST-T wave changes.  Her troponins 

were elevated as high as 0.606 and she was ruled in for non-STEMI.  In addition 

her BNP was 3570.  Chest x-ray demonstrated bilateral pleural effusions with 

pulmonary vascular congestion.  She was admitted for cardiology workup.  Dr. Tomas took her to the Cath Lab this morning which demonstrated previous stent 

to the RCA with mid RCA stenosis 80-90% and distal RCA stenosis 70%, subtotal 

occlusion in the mid LAD with extensive disease in the proximal LAD, and an OM 

branch of the circumflex artery with 60-70% stenosis.  Cardiothoracic surgery 

was consulted regarding surgical recommendations.





Review of Systems





Review of systems was completed and was negative except as noted.





- Cardiovascular


Reports as per HPI, Reports decreased exercise tolerance, Reports dyspnea on 

exertion, Reports leg edema, Reports rapid heart beat





Past Medical History


Past Medical History: Coronary Artery Disease (CAD), Chest Pain / Angina, 

Diabetes Mellitus, Hyperlipidemia, Myocardial Infarction (MI)


Last Myocardial Infarction Date:: 2006


History of Any Multi-Drug Resistant Organisms: None Reported


Past Surgical History:  Section, Heart Catheterization, Heart 

Catheterization With Stent


Additional Past Surgical History / Comment(s): vein work, leg stent


Past Anesthesia/Blood Transfusion Reactions: No Reported Reaction


Date of Last Stent Placement:: 2006


Past Psychological History: No Psychological Hx Reported


Smoking Status: Former smoker


Past Alcohol Use History: None Reported


Past Drug Use History: None Reported





- Past Family History


  ** Mother


Family Medical History: Coronary Artery Disease (CAD)





Medications and Allergies


 Home Medications











 Medication  Instructions  Recorded  Confirmed  Type


 


Aspirin EC [Ecotrin] 325 mg PO DAILY 18 History


 


Lisinopril [Zestril] 5 mg PO DAILY 18 History


 


Metoprolol Tartrate [Lopressor] 25 mg PO BID 18 History


 


metFORMIN HCL 1,000 mg PO BID 18 History


 


Glimepiride [Amaryl] 2 mg PO DAILY 19 History


 


Oxybutynin Chloride [Oxybutynin 10 mg PO DAILY 19 History





Chloride ER]    











 Allergies











Allergy/AdvReac Type Severity Reaction Status Date / Time


 


tetracycline Allergy  Rash/Hives Verified 19 19:00














Surgical - Exam


 Vital Signs











Temp Pulse Resp BP Pulse Ox


 


 98.2 F   83   18   143/88   96 


 


 19 17:46  19 17:46  19 17:46  19 17:46  19 17:46














- General


well developed, well nourished, no distress, no pain, obese





- Eyes


PERRL, normal ocular movement





- ENT


no hearing loss





- Neck


no masses, no bruits, trachea midline





- Respiratory





Lungs sounds diminished bilaterally.  Respirations even, nonlabored.  Currently 

on room air with oxygen saturation 93%.  No chest wall deformities.





- Cardiovascular





S1, S2 present.  Regular rate and rhythm, sinus rhythm on telemetry.  Palpable 

peripheral pulses bilaterally.  Trace bilateral lower extremity nonpitting 

edema present.  No calf pain or tenderness noted.  Positive varicosities to 

both lower extremities.





- Abdomen


Abdomen: soft, non tender, bowel sounds





- Genitourinary





Deferred





- Rectum





Deferred





- Integumentary





Skin is warm and dry with evidence of good perfusion.  Right groin arterial 

access site soft, nontender.





- Neurologic


normal coordination, normal sensation





- Musculoskeletal


normal posture





- Psychiatric


oriented to time, oriented to person, oriented to place, speech is normal, 

memory intact





Results





- Labs





 19 09:19





 19 09:19


 Abnormal Lab Results - Last 24 Hours (Table)











  19 Range/Units





  18:07 18:07 18:07 


 


RBC   3.58 L   (3.80-5.40)  m/uL


 


Hgb   10.2 L   (11.4-16.0)  gm/dL


 


Hct   31.7 L   (34.0-46.0)  %


 


APTT     (22.0-30.0)  sec


 


D-Dimer     (<0.60)  mg/L FEU


 


Chloride    108 H  ()  mmol/L


 


Carbon Dioxide    21 L  (22-30)  mmol/L


 


BUN    27 H  (7-17)  mg/dL


 


Creatinine    1.32 H  (0.52-1.04)  mg/dL


 


Glucose    156 H  (74-99)  mg/dL


 


POC Glucose (mg/dL)     (75-99)  mg/dL


 


Troponin I  0.606 H*    (0.000-0.034)  ng/mL


 


Cholesterol     (<200)  mg/dL


 


LDL Cholesterol, Calc     (0-99)  mg/dL


 


HDL Cholesterol     (40-60)  mg/dL














  19 Range/Units





  18:07 23:36 05:33 


 


RBC    3.15 L  (3.80-5.40)  m/uL


 


Hgb    9.0 L  (11.4-16.0)  gm/dL


 


Hct    28.1 L  (34.0-46.0)  %


 


APTT  21.7 L    (22.0-30.0)  sec


 


D-Dimer     (<0.60)  mg/L FEU


 


Chloride     ()  mmol/L


 


Carbon Dioxide     (22-30)  mmol/L


 


BUN     (7-17)  mg/dL


 


Creatinine     (0.52-1.04)  mg/dL


 


Glucose     (74-99)  mg/dL


 


POC Glucose (mg/dL)     (75-99)  mg/dL


 


Troponin I   0.487 H*   (0.000-0.034)  ng/mL


 


Cholesterol     (<200)  mg/dL


 


LDL Cholesterol, Calc     (0-99)  mg/dL


 


HDL Cholesterol     (40-60)  mg/dL














  19 Range/Units





  05:33 06:14 09:16 


 


RBC     (3.80-5.40)  m/uL


 


Hgb     (11.4-16.0)  gm/dL


 


Hct     (34.0-46.0)  %


 


APTT    42.4 H  (22.0-30.0)  sec


 


D-Dimer    1.13 H  (<0.60)  mg/L FEU


 


Chloride     ()  mmol/L


 


Carbon Dioxide     (22-30)  mmol/L


 


BUN     (7-17)  mg/dL


 


Creatinine     (0.52-1.04)  mg/dL


 


Glucose     (74-99)  mg/dL


 


POC Glucose (mg/dL)   121 H   (75-99)  mg/dL


 


Troponin I  0.483 H*    (0.000-0.034)  ng/mL


 


Cholesterol     (<200)  mg/dL


 


LDL Cholesterol, Calc     (0-99)  mg/dL


 


HDL Cholesterol     (40-60)  mg/dL














  19 Range/Units





  09:19 09:19 11:54 


 


RBC  3.57 L    (3.80-5.40)  m/uL


 


Hgb  10.4 L    (11.4-16.0)  gm/dL


 


Hct  32.2 L    (34.0-46.0)  %


 


APTT     (22.0-30.0)  sec


 


D-Dimer     (<0.60)  mg/L FEU


 


Chloride   109 H   ()  mmol/L


 


Carbon Dioxide     (22-30)  mmol/L


 


BUN   27 H   (7-17)  mg/dL


 


Creatinine   1.23 H   (0.52-1.04)  mg/dL


 


Glucose   117 H   (74-99)  mg/dL


 


POC Glucose (mg/dL)    142 H  (75-99)  mg/dL


 


Troponin I     (0.000-0.034)  ng/mL


 


Cholesterol   236 H   (<200)  mg/dL


 


LDL Cholesterol, Calc   175 H   (0-99)  mg/dL


 


HDL Cholesterol   35 L   (40-60)  mg/dL








 Diabetes panel











  19 Range/Units





  18:07 09:19 


 


Sodium  138  139  (137-145)  mmol/L


 


Potassium  5.0  4.7  (3.5-5.1)  mmol/L


 


Chloride  108 H  109 H  ()  mmol/L


 


Carbon Dioxide  21 L  24  (22-30)  mmol/L


 


BUN  27 H  27 H  (7-17)  mg/dL


 


Creatinine  1.32 H  1.23 H  (0.52-1.04)  mg/dL


 


Glucose  156 H  117 H  (74-99)  mg/dL


 


Calcium  9.4  9.3  (8.4-10.2)  mg/dL


 


AST  17  15  (14-36)  U/L


 


ALT  18  20  (9-52)  U/L


 


Alkaline Phosphatase  89  89  ()  U/L


 


Total Protein  7.1  6.7  (6.3-8.2)  g/dL


 


Albumin  3.8  3.6  (3.5-5.0)  g/dL


 


Triglycerides   132  (<150)  mg/dL


 


HDL Cholesterol   35 L  (40-60)  mg/dL








 Thyroid panel











  19 Range/Units





  09:19 


 


TSH  1.830  (0.465-4.680)  mIU/L








 Calcium panel











  19 Range/Units





  18:07 09:19 


 


Calcium  9.4  9.3  (8.4-10.2)  mg/dL


 


Albumin  3.8  3.6  (3.5-5.0)  g/dL








 Pituitary panel











  19 Range/Units





  18:07 09:19 


 


Sodium  138  139  (137-145)  mmol/L


 


Potassium  5.0  4.7  (3.5-5.1)  mmol/L


 


Chloride  108 H  109 H  ()  mmol/L


 


Carbon Dioxide  21 L  24  (22-30)  mmol/L


 


BUN  27 H  27 H  (7-17)  mg/dL


 


Creatinine  1.32 H  1.23 H  (0.52-1.04)  mg/dL


 


Glucose  156 H  117 H  (74-99)  mg/dL


 


Calcium  9.4  9.3  (8.4-10.2)  mg/dL


 


TSH   1.830  (0.465-4.680)  mIU/L








 Adrenal panel











  19 Range/Units





  18:07 09:19 


 


Sodium  138  139  (137-145)  mmol/L


 


Potassium  5.0  4.7  (3.5-5.1)  mmol/L


 


Chloride  108 H  109 H  ()  mmol/L


 


Carbon Dioxide  21 L  24  (22-30)  mmol/L


 


BUN  27 H  27 H  (7-17)  mg/dL


 


Creatinine  1.32 H  1.23 H  (0.52-1.04)  mg/dL


 


Glucose  156 H  117 H  (74-99)  mg/dL


 


Calcium  9.4  9.3  (8.4-10.2)  mg/dL


 


Total Bilirubin  0.4  0.5  (0.2-1.3)  mg/dL


 


AST  17  15  (14-36)  U/L


 


ALT  18  20  (9-52)  U/L


 


Alkaline Phosphatase  89  89  ()  U/L


 


Total Protein  7.1  6.7  (6.3-8.2)  g/dL


 


Albumin  3.8  3.6  (3.5-5.0)  g/dL














- Imaging


Chest x-ray: report reviewed, image reviewed


EKG: image reviewed


Additional studies: 





Heart catheterization results reviewed





Assessment and Plan


(1) History of myocardial infarction, greater than 8 weeks ago


Current Visit: No   Status: Resolved   Code(s): I25.2 - OLD MYOCARDIAL 

INFARCTION   SNOMED Code(s): 1365504


   





(2) Non-STEMI (non-ST elevated myocardial infarction)


Current Visit: Yes   Status: Acute   Code(s): I21.4 - NON-ST ELEVATION (NSTEMI) 

MYOCARDIAL INFARCTION   SNOMED Code(s): 65249060


   





(3) History of heart artery stent


Current Visit: Yes   Status: Chronic   Code(s): Z95.5 - PRESENCE OF CORONARY 

ANGIOPLASTY IMPLANT AND GRAFT   SNOMED Code(s): 199972550


   





(4) Peripheral artery disease


Current Visit: Yes   Status: Chronic   Code(s): I73.9 - PERIPHERAL VASCULAR 

DISEASE, UNSPECIFIED   SNOMED Code(s): 508921940


   





(5) Tobacco dependence in remission


Current Visit: No   Status: Resolved   Code(s): F17.201 - NICOTINE DEPENDENCE, 

UNSPECIFIED, IN REMISSION   SNOMED Code(s): 255732961


   





(6) Hyperlipidemia


Current Visit: Yes   Status: Chronic   Code(s): E78.5 - HYPERLIPIDEMIA, 

UNSPECIFIED   SNOMED Code(s): 42499119


   





(7) Diabetes mellitus


Current Visit: Yes   Status: Chronic   Code(s): E11.9 - TYPE 2 DIABETES 

MELLITUS WITHOUT COMPLICATIONS   SNOMED Code(s): 10962751


   





(8) Obesity (BMI 30.0-34.9)


Current Visit: Yes   Status: Chronic   Code(s): E66.9 - OBESITY, UNSPECIFIED   

SNOMED Code(s): 166901363817687


   


Plan: 





The patient was seen and examined at the bedside.  Her chart/diagnostics were 

reviewed.  Heart catheterization films were reviewed.  The case will be 

discussed with Dr. Nielsen in detail.  The usual open-heart surgery perioperative 

course was discussed with the patient and her family, risks and benefits were 

discussed, all questions were answered.  Preoperative testing was ordered.  We 

recommend maximizing medical therapy with aspirin, statin, beta blocker.  

Continue diuresis with Lasix.  Continued medical management per primary care 

service.  Cardiology management per Dr. Tomas.  More recommendations to follow 

once Dr. Nielsen has viewed heart catheterization films and preoperative testing 

has been completed.





Thank you Dr. Tomas for this consult.  We look forward to working with you in 

the care of your patient.


Time with Patient: Greater than 30





<Iasac Nielsen - Last Filed: 19 18:07>





Surgical - Exam


 Vital Signs











Temp Pulse Resp BP Pulse Ox


 


 98.2 F   83   18   143/88   96 


 


 19 17:46  19 17:46  19 17:46  19 17:46  19 17:46














Results





- Labs





 19 09:19





 19 09:19


 Abnormal Lab Results - Last 24 Hours (Table)











  19 Range/Units





  18:07 18:07 18:07 


 


RBC   3.58 L   (3.80-5.40)  m/uL


 


Hgb   10.2 L   (11.4-16.0)  gm/dL


 


Hct   31.7 L   (34.0-46.0)  %


 


APTT     (22.0-30.0)  sec


 


D-Dimer     (<0.60)  mg/L FEU


 


Chloride    108 H  ()  mmol/L


 


Carbon Dioxide    21 L  (22-30)  mmol/L


 


BUN    27 H  (7-17)  mg/dL


 


Creatinine    1.32 H  (0.52-1.04)  mg/dL


 


Glucose    156 H  (74-99)  mg/dL


 


POC Glucose (mg/dL)     (75-99)  mg/dL


 


Troponin I  0.606 H*    (0.000-0.034)  ng/mL


 


Cholesterol     (<200)  mg/dL


 


LDL Cholesterol, Calc     (0-99)  mg/dL


 


HDL Cholesterol     (40-60)  mg/dL


 


Urine Protein     (Negative)  


 


Urine Mucus     (None)  /hpf














  19 Range/Units





  18:07 23:36 05:33 


 


RBC    3.15 L  (3.80-5.40)  m/uL


 


Hgb    9.0 L  (11.4-16.0)  gm/dL


 


Hct    28.1 L  (34.0-46.0)  %


 


APTT  21.7 L    (22.0-30.0)  sec


 


D-Dimer     (<0.60)  mg/L FEU


 


Chloride     ()  mmol/L


 


Carbon Dioxide     (22-30)  mmol/L


 


BUN     (7-17)  mg/dL


 


Creatinine     (0.52-1.04)  mg/dL


 


Glucose     (74-99)  mg/dL


 


POC Glucose (mg/dL)     (75-99)  mg/dL


 


Troponin I   0.487 H*   (0.000-0.034)  ng/mL


 


Cholesterol     (<200)  mg/dL


 


LDL Cholesterol, Calc     (0-99)  mg/dL


 


HDL Cholesterol     (40-60)  mg/dL


 


Urine Protein     (Negative)  


 


Urine Mucus     (None)  /hpf














  19 Range/Units





  05:33 06:14 09:16 


 


RBC     (3.80-5.40)  m/uL


 


Hgb     (11.4-16.0)  gm/dL


 


Hct     (34.0-46.0)  %


 


APTT    42.4 H  (22.0-30.0)  sec


 


D-Dimer    1.13 H  (<0.60)  mg/L FEU


 


Chloride     ()  mmol/L


 


Carbon Dioxide     (22-30)  mmol/L


 


BUN     (7-17)  mg/dL


 


Creatinine     (0.52-1.04)  mg/dL


 


Glucose     (74-99)  mg/dL


 


POC Glucose (mg/dL)   121 H   (75-99)  mg/dL


 


Troponin I  0.483 H*    (0.000-0.034)  ng/mL


 


Cholesterol     (<200)  mg/dL


 


LDL Cholesterol, Calc     (0-99)  mg/dL


 


HDL Cholesterol     (40-60)  mg/dL


 


Urine Protein     (Negative)  


 


Urine Mucus     (None)  /hpf














  19 Range/Units





  09:19 09:19 11:54 


 


RBC  3.57 L    (3.80-5.40)  m/uL


 


Hgb  10.4 L    (11.4-16.0)  gm/dL


 


Hct  32.2 L    (34.0-46.0)  %


 


APTT     (22.0-30.0)  sec


 


D-Dimer     (<0.60)  mg/L FEU


 


Chloride   109 H   ()  mmol/L


 


Carbon Dioxide     (22-30)  mmol/L


 


BUN   27 H   (7-17)  mg/dL


 


Creatinine   1.23 H   (0.52-1.04)  mg/dL


 


Glucose   117 H   (74-99)  mg/dL


 


POC Glucose (mg/dL)    142 H  (75-99)  mg/dL


 


Troponin I     (0.000-0.034)  ng/mL


 


Cholesterol   236 H   (<200)  mg/dL


 


LDL Cholesterol, Calc   175 H   (0-99)  mg/dL


 


HDL Cholesterol   35 L   (40-60)  mg/dL


 


Urine Protein     (Negative)  


 


Urine Mucus     (None)  /hpf














  19 Range/Units





  12:40 17:13 


 


RBC    (3.80-5.40)  m/uL


 


Hgb    (11.4-16.0)  gm/dL


 


Hct    (34.0-46.0)  %


 


APTT    (22.0-30.0)  sec


 


D-Dimer    (<0.60)  mg/L FEU


 


Chloride    ()  mmol/L


 


Carbon Dioxide    (22-30)  mmol/L


 


BUN    (7-17)  mg/dL


 


Creatinine    (0.52-1.04)  mg/dL


 


Glucose    (74-99)  mg/dL


 


POC Glucose (mg/dL)   122 H  (75-99)  mg/dL


 


Troponin I    (0.000-0.034)  ng/mL


 


Cholesterol    (<200)  mg/dL


 


LDL Cholesterol, Calc    (0-99)  mg/dL


 


HDL Cholesterol    (40-60)  mg/dL


 


Urine Protein  1+ H   (Negative)  


 


Urine Mucus  Rare H   (None)  /hpf








 Diabetes panel











  19 Range/Units





  18:07 09:19 


 


Sodium  138  139  (137-145)  mmol/L


 


Potassium  5.0  4.7  (3.5-5.1)  mmol/L


 


Chloride  108 H  109 H  ()  mmol/L


 


Carbon Dioxide  21 L  24  (22-30)  mmol/L


 


BUN  27 H  27 H  (7-17)  mg/dL


 


Creatinine  1.32 H  1.23 H  (0.52-1.04)  mg/dL


 


Glucose  156 H  117 H  (74-99)  mg/dL


 


Calcium  9.4  9.3  (8.4-10.2)  mg/dL


 


AST  17  15  (14-36)  U/L


 


ALT  18  20  (9-52)  U/L


 


Alkaline Phosphatase  89  89  ()  U/L


 


Total Protein  7.1  6.7  (6.3-8.2)  g/dL


 


Albumin  3.8  3.6  (3.5-5.0)  g/dL


 


Triglycerides   132  (<150)  mg/dL


 


HDL Cholesterol   35 L  (40-60)  mg/dL








 Thyroid panel











  19 Range/Units





  09: 


 


TSH  1.830  (0.465-4.680)  mIU/L








 Calcium panel











  19 Range/Units





  18: 09: 


 


Calcium  9.4  9.3  (8.4-10.2)  mg/dL


 


Albumin  3.8  3.6  (3.5-5.0)  g/dL








 Pituitary panel











  19 Range/Units





  18: 09: 


 


Sodium  138  139  (137-145)  mmol/L


 


Potassium  5.0  4.7  (3.5-5.1)  mmol/L


 


Chloride  108 H  109 H  ()  mmol/L


 


Carbon Dioxide  21 L  24  (22-30)  mmol/L


 


BUN  27 H  27 H  (7-17)  mg/dL


 


Creatinine  1.32 H  1.23 H  (0.52-1.04)  mg/dL


 


Glucose  156 H  117 H  (74-99)  mg/dL


 


Calcium  9.4  9.3  (8.4-10.2)  mg/dL


 


TSH   1.830  (0.465-4.680)  mIU/L








 Adrenal panel











  19 Range/Units





  18: 09: 


 


Sodium  138  139  (137-145)  mmol/L


 


Potassium  5.0  4.7  (3.5-5.1)  mmol/L


 


Chloride  108 H  109 H  ()  mmol/L


 


Carbon Dioxide  21 L  24  (22-30)  mmol/L


 


BUN  27 H  27 H  (7-17)  mg/dL


 


Creatinine  1.32 H  1.23 H  (0.52-1.04)  mg/dL


 


Glucose  156 H  117 H  (74-99)  mg/dL


 


Calcium  9.4  9.3  (8.4-10.2)  mg/dL


 


Total Bilirubin  0.4  0.5  (0.2-1.3)  mg/dL


 


AST  17  15  (14-36)  U/L


 


ALT  18  20  (9-52)  U/L


 


Alkaline Phosphatase  89  89  ()  U/L


 


Total Protein  7.1  6.7  (6.3-8.2)  g/dL


 


Albumin  3.8  3.6  (3.5-5.0)  g/dL














Assessment and Plan


Plan: 


The patient was seen and examined.  History and physical findings were 

verified.  I agree with the above assessment and plan.  The patient is a 68-year

-old female with history of multiple medical problems including previous 

coronary stents placed in .  She states that for the last several months, 

she has been experiencing shortness of breath with activity.  She denies chest 

pain.  Upon presentation to the hospital she was ruled in for non-ST elevation 

myocardial infarction.  Cardiac catheterization revealed multivessel coronary 

artery disease.  Unfortunately, her targets are suboptimal.  Specifically, her 

left anterior descending artery is nearly occluded with a poor distal target 

visualized.  Her ejection fraction is also quite depressed on the order of 20%.

  Finally, she has had recent vein stripping in both her lower legs secondary 

to advanced varicosities.  We will obtain the remainder of her workup and speak 

with her cardiologist to determine whether additional PCI is an option.

## 2019-01-14 NOTE — ECHOF
Referral Reason:Heart Failure



MEASUREMENTS

--------

HEIGHT: 167.6 cm

WEIGHT: 92.1 kg

BP: 116/68

RVIDd:   2.1 cm     (< 3.3)

IVSd:   1.3 cm     (0.6 - 1.1)

LVIDd:   5.0 cm     (3.9 - 5.3)

LVPWd:   1.3 cm     (0.6 - 1.1)

IVSs:   1.8 cm

LVIDs:   4.5 cm

LVPWs:   1.3 cm

LAESV Index (A-L):   41.67 ml/m

Ao Diam:   3.4 cm     (2.0 - 3.7)

AV Cusp:   1.5 cm     (1.5 - 2.6)

LA Diam:   4.1 cm     (2.7 - 3.8)

MV EXCURSION:   14.924 mm     (> 18.000)

MV EF SLOPE:   93 mm/s     (70 - 150)

EPSS:   1.5 cm

MV E Reji:   1.15 m/s

MV DecT:   159 ms

MV A Reji:   1.09 m/s

MV E/A Ratio:   1.06 

RAP:   5.00 mmHg

RVSP:   8.72 mmHg







FINDINGS

--------

Sinus rhythm.

This was a technically difficult study with suboptimal views.

The left ventricular size is normal.   There is mild concentric left ventricular hypertrophy.   There
 is severe global hypokinesis of LV .   Overall left ventricular systolic function is severely impair
ed with, an EF between 20 - 25 %.   Basal inferior LV wall motion is hypokinetic.    Basal inferosept
al LV wall motion is hypokinetic.    Apical anterior LV wall motion is hypokinetic.    Apical lateral
 LV wall motion is hypokinetic.    Apical inferior LV wall motion is hypokinetic.    Apical septum LV
 wall motion is hypokinetic.

The right ventricle is normal in size and function.

LA is severely dilated >40 ml/m2

The right atrium is normal in size.

Lumason used

Aortic valve is trileaflet and is mildly thickened.

Mild mitral regurgitation is present.

Trace tricuspid regurgitation present.   The right ventricular systolic pressure, as measured by Dopp
ler, is 8.72mmHg.

Pulmonic valve appears structurally normal.

The aortic root size is normal.

The pericardium is normal.



CONCLUSIONS

--------

1. Sinus rhythm.

2. This was a technically difficult study with suboptimal views.

3. The left ventricular size is normal.

4. There is mild concentric left ventricular hypertrophy.

5. There is severe global hypokinesis of LV .

6. Overall left ventricular systolic function is severely impaired with, an EF between 20 - 25 %.

7. Basal inferior LV wall motion is hypokinetic.

8. Basal inferoseptal LV wall motion is hypokinetic.

9. Apical anterior LV wall motion is hypokinetic.

10. Apical lateral LV wall motion is hypokinetic.

11. Apical inferior LV wall motion is hypokinetic.

12. Apical septum LV wall motion is hypokinetic.

13. The right ventricle is normal in size and function.

14. LA is severely dilated >40 ml/m2

15. The right atrium is normal in size.

16. Lumason used

17. Aortic valve is trileaflet and is mildly thickened.

18. Mild mitral regurgitation is present.

19. Trace tricuspid regurgitation present.

20. The right ventricular systolic pressure, as measured by Doppler, is 8.72mmHg.

21. Pulmonic valve appears structurally normal.

22. The aortic root size is normal.

23. The pericardium is normal.





SONOGRAPHER: Rhona Stack RDCS

## 2019-01-15 LAB
ANION GAP SERPL CALC-SCNC: 10 MMOL/L
BASOPHILS # BLD AUTO: 0 K/UL (ref 0–0.2)
BASOPHILS NFR BLD AUTO: 1 %
BUN SERPL-SCNC: 31 MG/DL (ref 7–17)
CALCIUM SPEC-MCNC: 9.4 MG/DL (ref 8.4–10.2)
CHLORIDE SERPL-SCNC: 102 MMOL/L (ref 98–107)
CO2 SERPL-SCNC: 28 MMOL/L (ref 22–30)
EOSINOPHIL # BLD AUTO: 0.2 K/UL (ref 0–0.7)
EOSINOPHIL NFR BLD AUTO: 3 %
ERYTHROCYTE [DISTWIDTH] IN BLOOD BY AUTOMATED COUNT: 3.45 M/UL (ref 3.8–5.4)
ERYTHROCYTE [DISTWIDTH] IN BLOOD: 14.6 % (ref 11.5–15.5)
GLUCOSE BLD-MCNC: 120 MG/DL (ref 75–99)
GLUCOSE BLD-MCNC: 193 MG/DL (ref 75–99)
GLUCOSE BLD-MCNC: 203 MG/DL (ref 75–99)
GLUCOSE BLD-MCNC: 218 MG/DL (ref 75–99)
GLUCOSE SERPL-MCNC: 177 MG/DL (ref 74–99)
HCT VFR BLD AUTO: 30.8 % (ref 34–46)
HGB BLD-MCNC: 9.7 GM/DL (ref 11.4–16)
HYALINE CASTS UR QL AUTO: 1 /LPF (ref 0–2)
LYMPHOCYTES # SPEC AUTO: 1.5 K/UL (ref 1–4.8)
LYMPHOCYTES NFR SPEC AUTO: 25 %
MCH RBC QN AUTO: 28.1 PG (ref 25–35)
MCHC RBC AUTO-ENTMCNC: 31.5 G/DL (ref 31–37)
MCV RBC AUTO: 89.1 FL (ref 80–100)
MONOCYTES # BLD AUTO: 0.4 K/UL (ref 0–1)
MONOCYTES NFR BLD AUTO: 6 %
NEUTROPHILS # BLD AUTO: 3.6 K/UL (ref 1.3–7.7)
NEUTROPHILS NFR BLD AUTO: 61 %
PH UR: 6 [PH] (ref 5–8)
PLATELET # BLD AUTO: 311 K/UL (ref 150–450)
POTASSIUM SERPL-SCNC: 4.5 MMOL/L (ref 3.5–5.1)
PROT UR QL: (no result)
RBC UR QL: <1 /HPF (ref 0–5)
SODIUM SERPL-SCNC: 140 MMOL/L (ref 137–145)
SP GR UR: 1.01 (ref 1–1.03)
SQUAMOUS UR QL AUTO: 1 /HPF (ref 0–4)
UROBILINOGEN UR QL STRIP: <2 MG/DL (ref ?–2)
WBC # BLD AUTO: 5.9 K/UL (ref 3.8–10.6)
WBC #/AREA URNS HPF: 1 /HPF (ref 0–5)

## 2019-01-15 RX ADMIN — INSULIN ASPART SCH UNIT: 100 INJECTION, SOLUTION INTRAVENOUS; SUBCUTANEOUS at 21:42

## 2019-01-15 RX ADMIN — METOPROLOL TARTRATE SCH MG: 50 TABLET, FILM COATED ORAL at 08:54

## 2019-01-15 RX ADMIN — METOPROLOL TARTRATE SCH MG: 50 TABLET, FILM COATED ORAL at 20:28

## 2019-01-15 RX ADMIN — FUROSEMIDE SCH MG: 40 TABLET ORAL at 16:23

## 2019-01-15 RX ADMIN — LISINOPRIL SCH MG: 5 TABLET ORAL at 08:54

## 2019-01-15 RX ADMIN — INSULIN ASPART SCH UNIT: 100 INJECTION, SOLUTION INTRAVENOUS; SUBCUTANEOUS at 12:13

## 2019-01-15 RX ADMIN — INSULIN ASPART SCH UNIT: 100 INJECTION, SOLUTION INTRAVENOUS; SUBCUTANEOUS at 16:29

## 2019-01-15 RX ADMIN — ASPIRIN 325 MG ORAL TABLET SCH MG: 325 PILL ORAL at 08:55

## 2019-01-15 RX ADMIN — ATORVASTATIN CALCIUM SCH MG: 40 TABLET, FILM COATED ORAL at 08:54

## 2019-01-15 RX ADMIN — OXYBUTYNIN CHLORIDE SCH MG: 10 TABLET, EXTENDED RELEASE ORAL at 08:55

## 2019-01-15 RX ADMIN — FUROSEMIDE SCH MG: 10 INJECTION, SOLUTION INTRAMUSCULAR; INTRAVENOUS at 08:55

## 2019-01-15 RX ADMIN — SPIRONOLACTONE SCH MG: 25 TABLET, FILM COATED ORAL at 08:58

## 2019-01-15 RX ADMIN — INSULIN ASPART SCH: 100 INJECTION, SOLUTION INTRAVENOUS; SUBCUTANEOUS at 05:46

## 2019-01-15 NOTE — US
EXAMINATION TYPE: US renals and bladder

 

DATE OF EXAM: 1/15/2019

 

COMPARISON: NONE

 

CLINICAL HISTORY: assess for CKD; diabetic

 

EXAM MEASUREMENTS:

 

Right Kidney:  10.1 x 5.7 x 5.7 cm

Left Kidney: 11.0 x 5.0 x 5.4 cm

Post Void Residual Volume:  not assessed on inpatient

 

 

 

Right Kidney: No hydronephrosis or masses seen  

Left Kidney: simple cyst is seen in upper lateral cortex = 1.3 x 1.6 x 1.1cm    

Bladder: wnl

**Bilateral Jets seen: yes

 

There is no evidence for hydronephrosis at this point in time.  No nephrolithiasis is seen. Cystic fo
cus within the upper pole left kidney shows increased through transmission, is anechoic and has imper
ceptible wall. Cortical medullary differentiation is maintained. The urinary bladder is anechoic.  Bi
lateral ureteral jets are seen.

 

Incidental findings of shadowing stones is noted in gallbladder.

 

IMPRESSION:

Cholelithiasis. Simple cyst left kidney.

## 2019-01-15 NOTE — CONS
KAITY Cifuentes is a 68-year-old lady who was admitted to hospital with non-ST-segment-elevation

MI and underwent cardiac catheterization that revealed severe triple-vessel coronary

artery disease. She has ischemic cardiomyopathy with severe LV dysfunction. Patient had

been evaluated by a cardiothoracic surgeon who felt that she is not an optimal

candidate for bypass surgery.  Dr. Elise, the on-call interventionist, reviewed her

angiographic data, felt that the patient would probably benefit more from bypass

surgery than catheter-based revascularization.  I am going to have a second

cardiothoracic surgeon give us an opinion. Dr. Hoskins is here tomorrow and will review

the angiographic data and give an opinion. This morning patient is doing well.  Denies

chest pain or difficulty in breathing.  Groin is free of bleeding, bruit, hematoma.



Current medications include Lasix 40 mg q.12, Zestril 5 mg daily, Glucophage,

Lopressor, Aldactone, aspirin and Lipitor.



On exam, comfortable at rest.  Vital signs are stable.  There is no jugular venous

distention.  Chest exam reveals good air entry bilaterally.  Heart exam reveals first

and second heart sounds.  No gallop.  No murmur.  Abdomen is soft, nontender.

Examination of extremities did not reveal any edema.  Peripheral pulses are felt.



Labs show that the hemoglobin is 9.7.



ASSESSMENT:

1. Acute myocardial infarction.

2. Severe three-vessel coronary artery disease.

3. Ischemic cardiomyopathy with severe left ventricular dysfunction.



PLAN:

Patient is on optimal medical therapy.  I am going to switch her to p.o. Lasix.  We

will get another opinion from Cardiothoracic Surgery tomorrow and decide on which way

to proceed.





MMODL / IJN: [id number] / Job#: [id number]

## 2019-01-15 NOTE — P.PN
<Rhona Seals - Last Filed: 01/15/19 09:32>





Subjective


Progress Note Date: 01/15/19


Principal diagnosis: 





Severe triple vessel coronary artery disease.  Previous medical history of 

myocardial infarction in 2006 with stent placement, hyperlipidemia, peripheral 

artery disease with stent placement to the left lower extremity in 2016, recent 

vein surgery to bilateral lower extremities, uncontrolled non-insulin-dependent 

diabetes mellitus with hemoglobin A1c 7.1%, shingles, obesity, previous tobacco 

dependence, family history of early coronary artery disease.





The patient is currently sitting up in bed in no acute distress.  Denies any 

chest pain or shortness of breath.  No new questions at this time.  

Preoperative testing ongoing.





Objective





- Vital Signs


Vital signs: 


 Vital Signs











Temp  97.7 F   01/15/19 09:11


 


Pulse  92   01/15/19 09:11


 


Resp  18   01/15/19 09:11


 


BP  133/80   01/15/19 09:11


 


Pulse Ox  95   01/15/19 09:11








 Intake & Output











 01/14/19 01/15/19 01/15/19





 18:59 06:59 18:59


 


Intake Total 560.808 330 


 


Output Total 600  


 


Balance -39.192 330 


 


Weight 92.5 kg 89.7 kg 


 


Intake:   


 


  IV 10  


 


  Intake, IV Titration 70.808  





  Amount   


 


    Heparin Sod,Pork in 0.45% 70.808  





    NaCl 25,000 unit In 0.45   





    % NaCl 1 250ml.bag @ 11.   





    024 UNITS/KG/HR 10 mls/hr   





    IV .Q24H [facility] Rx#:   





    [id number]   


 


  Oral 480 330 


 


Output:   


 


  Urine 600  


 


Other:   


 


  Voiding Method  Toilet 


 


  # Voids 2 1 














- Constitutional


General appearance: Present: cooperative, no acute distress, obese





- Respiratory


Details: 





Lungs sounds diminished bilaterally.  Respirations even, nonlabored.  Currently 

on room air with oxygen saturation 93%.  Strong cough.





- Cardiovascular


Details: 





S1, S2 present.  Regular rate and rhythm, sinus rhythm on telemetry.  Palpable 

peripheral pulses bilaterally.  Trace bilateral lower extremity nonpitting 

edema present.  No calf pain or tenderness noted.  Right femoral artery heart 

catheterization site soft, nontender.





- Gastrointestinal


Gastrointestinal Comment(s): 





Abdomen soft, nontender, nondistended, obese.  Active bowel sounds present 4 

quadrants.  Tolerating diet.





- Genitourinary


Genitourinary Comment(s): 





Continues to void clear, yellow urine.





- Integumentary


Integumentary Comment(s): 





Skin is warm and dry with evidence of good perfusion.





- Neurologic


Neurologic: Present: CNII-XII intact





- Musculoskeletal


Musculoskeletal: Present: gait normal, strength equal bilaterally





- Psychiatric


Psychiatric: Present: A&O x's 3, appropriate affect, intact judgment & insight





- Allied health notes


Allied health notes reviewed: nursing





- Labs


CBC & Chem 7: 


 01/15/19 05:45





 01/14/19 09:19


Labs: 


 Abnormal Lab Results - Last 24 Hours (Table)











  01/14/19 01/14/19 01/14/19 Range/Units





  09:16 09:19 09:19 


 


RBC   3.57 L   (3.80-5.40)  m/uL


 


Hgb   10.4 L   (11.4-16.0)  gm/dL


 


Hct   32.2 L   (34.0-46.0)  %


 


APTT  42.4 H    (22.0-30.0)  sec


 


D-Dimer  1.13 H    (<0.60)  mg/L FEU


 


Chloride    109 H  ()  mmol/L


 


BUN    27 H  (7-17)  mg/dL


 


Creatinine    1.23 H  (0.52-1.04)  mg/dL


 


Glucose    117 H  (74-99)  mg/dL


 


POC Glucose (mg/dL)     (75-99)  mg/dL


 


Hemoglobin A1c     (4.0-6.0)  %


 


Cholesterol    236 H  (<200)  mg/dL


 


LDL Cholesterol, Calc    175 H  (0-99)  mg/dL


 


HDL Cholesterol    35 L  (40-60)  mg/dL


 


Urine Protein     (Negative)  


 


Urine Mucus     (None)  /hpf














  01/14/19 01/14/19 01/14/19 Range/Units





  09:19 11:54 12:40 


 


RBC     (3.80-5.40)  m/uL


 


Hgb     (11.4-16.0)  gm/dL


 


Hct     (34.0-46.0)  %


 


APTT     (22.0-30.0)  sec


 


D-Dimer     (<0.60)  mg/L FEU


 


Chloride     ()  mmol/L


 


BUN     (7-17)  mg/dL


 


Creatinine     (0.52-1.04)  mg/dL


 


Glucose     (74-99)  mg/dL


 


POC Glucose (mg/dL)   142 H   (75-99)  mg/dL


 


Hemoglobin A1c  7.1 H    (4.0-6.0)  %


 


Cholesterol     (<200)  mg/dL


 


LDL Cholesterol, Calc     (0-99)  mg/dL


 


HDL Cholesterol     (40-60)  mg/dL


 


Urine Protein    1+ H  (Negative)  


 


Urine Mucus    Rare H  (None)  /hpf














  01/14/19 01/14/19 01/15/19 Range/Units





  17:13 20:43 05:41 


 


RBC     (3.80-5.40)  m/uL


 


Hgb     (11.4-16.0)  gm/dL


 


Hct     (34.0-46.0)  %


 


APTT     (22.0-30.0)  sec


 


D-Dimer     (<0.60)  mg/L FEU


 


Chloride     ()  mmol/L


 


BUN     (7-17)  mg/dL


 


Creatinine     (0.52-1.04)  mg/dL


 


Glucose     (74-99)  mg/dL


 


POC Glucose (mg/dL)  122 H  160 H  120 H  (75-99)  mg/dL


 


Hemoglobin A1c     (4.0-6.0)  %


 


Cholesterol     (<200)  mg/dL


 


LDL Cholesterol, Calc     (0-99)  mg/dL


 


HDL Cholesterol     (40-60)  mg/dL


 


Urine Protein     (Negative)  


 


Urine Mucus     (None)  /hpf














  01/15/19 Range/Units





  05:45 


 


RBC  3.45 L  (3.80-5.40)  m/uL


 


Hgb  9.7 L  (11.4-16.0)  gm/dL


 


Hct  30.8 L  (34.0-46.0)  %


 


APTT   (22.0-30.0)  sec


 


D-Dimer   (<0.60)  mg/L FEU


 


Chloride   ()  mmol/L


 


BUN   (7-17)  mg/dL


 


Creatinine   (0.52-1.04)  mg/dL


 


Glucose   (74-99)  mg/dL


 


POC Glucose (mg/dL)   (75-99)  mg/dL


 


Hemoglobin A1c   (4.0-6.0)  %


 


Cholesterol   (<200)  mg/dL


 


LDL Cholesterol, Calc   (0-99)  mg/dL


 


HDL Cholesterol   (40-60)  mg/dL


 


Urine Protein   (Negative)  


 


Urine Mucus   (None)  /hpf








 Microbiology - Last 24 Hours (Table)











 01/14/19 12:40 Urine Culture - Preliminary





 Urine,Voided 


 


 01/14/19 13:45 Nasal Screen MRSA/MSSA - Preliminary





 Nasal Swab 














- Imaging and Cardiology





Carotid Dopplers, echocardiogram results reviewed





Assessment and Plan


(1) History of myocardial infarction, greater than 8 weeks ago


Current Visit: No   Status: Resolved   Code(s): I25.2 - OLD MYOCARDIAL 

INFARCTION   SNOMED Code(s): 4154022


   





(2) Non-STEMI (non-ST elevated myocardial infarction)


Current Visit: Yes   Status: Acute   Code(s): I21.4 - NON-ST ELEVATION (NSTEMI) 

MYOCARDIAL INFARCTION   SNOMED Code(s): 56953209


   





(3) History of heart artery stent


Current Visit: Yes   Status: Chronic   Code(s): Z95.5 - PRESENCE OF CORONARY 

ANGIOPLASTY IMPLANT AND GRAFT   SNOMED Code(s): 101533483


   





(4) Peripheral artery disease


Current Visit: Yes   Status: Chronic   Code(s): I73.9 - PERIPHERAL VASCULAR 

DISEASE, UNSPECIFIED   SNOMED Code(s): 136421760


   





(5) Tobacco dependence in remission


Current Visit: No   Status: Resolved   Code(s): F17.201 - NICOTINE DEPENDENCE, 

UNSPECIFIED, IN REMISSION   SNOMED Code(s): 461392219


   





(6) Hyperlipidemia


Current Visit: Yes   Status: Chronic   Code(s): E78.5 - HYPERLIPIDEMIA, 

UNSPECIFIED   SNOMED Code(s): 75918715


   





(7) Diabetes mellitus


Current Visit: Yes   Status: Chronic   Code(s): E11.9 - TYPE 2 DIABETES 

MELLITUS WITHOUT COMPLICATIONS   SNOMED Code(s): 89090092


   





(8) Obesity (BMI 30.0-34.9)


Current Visit: Yes   Status: Chronic   Code(s): E66.9 - OBESITY, UNSPECIFIED   

SNOMED Code(s): 806233180885329


   


Plan: 





1.  Continue aspirin, statin, beta blocker, ACE inhibitor, Lasix, Aldactone.


2.  Encourage incentive spirometry 10 times every hour while awake.


3.  Increase activity, ambulate in hallway.


4.  STS risk score calculated, patient is at increased risk for mortality.


5.  Patient's distal targets are suboptimal, saphenous veins are usable for 

conduit, awaiting radial artery studies to determine availability of arterial 

conduit for bypasses, echocardiogram demonstrates significant LV dysfunction 

with ejection fraction 20% and global hypokinesis of the left ventricle.  This 

patient would be high risk and may benefit more from PCI if able.  Will discuss 

with Dr. Tomas.


6.  Continue medical management per Dr. Bernard, cardiology management per Dr. Tomas.


7.  Nephrology ordered per primary care, appreciate recommendations


8.  More recommendations to follow.





Time with Patient: Greater than 30





<Isaac Nielsen - Last Filed: 01/15/19 10:59>





Objective





- Vital Signs


Vital signs: 


 Vital Signs











Temp  97.7 F   01/15/19 09:11


 


Pulse  92   01/15/19 09:11


 


Resp  18   01/15/19 09:11


 


BP  133/80   01/15/19 09:11


 


Pulse Ox  95   01/15/19 09:11








 Intake & Output











 01/14/19 01/15/19 01/15/19





 18:59 06:59 18:59


 


Intake Total 560.808 330 240


 


Output Total 600  250


 


Balance -39.192 330 -10


 


Weight 92.5 kg 89.7 kg 


 


Intake:   


 


  IV 10  


 


  Intake, IV Titration 70.808  





  Amount   


 


    Heparin Sod,Pork in 0.45% 70.808  





    NaCl 25,000 unit In 0.45   





    % NaCl 1 250ml.bag @ 11.   





    024 UNITS/KG/HR 10 mls/hr   





    IV .Q24H [facility] Rx#:   





    [id number]   


 


  Oral 480 330 240


 


Output:   


 


  Urine 600  250


 


Other:   


 


  Voiding Method  Toilet 


 


  # Voids 2 1 1














- Labs


CBC & Chem 7: 


 01/15/19 05:45





 01/14/19 09:19


Labs: 


 Abnormal Lab Results - Last 24 Hours (Table)











  01/14/19 01/14/19 01/14/19 Range/Units





  09:19 09:19 09:19 


 


RBC  3.57 L    (3.80-5.40)  m/uL


 


Hgb  10.4 L    (11.4-16.0)  gm/dL


 


Hct  32.2 L    (34.0-46.0)  %


 


Chloride   109 H   ()  mmol/L


 


BUN   27 H   (7-17)  mg/dL


 


Creatinine   1.23 H   (0.52-1.04)  mg/dL


 


Glucose   117 H   (74-99)  mg/dL


 


POC Glucose (mg/dL)     (75-99)  mg/dL


 


Hemoglobin A1c    7.1 H  (4.0-6.0)  %


 


Cholesterol   236 H   (<200)  mg/dL


 


LDL Cholesterol, Calc   175 H   (0-99)  mg/dL


 


HDL Cholesterol   35 L   (40-60)  mg/dL


 


Urine Protein     (Negative)  


 


Urine Mucus     (None)  /hpf














  01/14/19 01/14/19 01/14/19 Range/Units





  11:54 12:40 17:13 


 


RBC     (3.80-5.40)  m/uL


 


Hgb     (11.4-16.0)  gm/dL


 


Hct     (34.0-46.0)  %


 


Chloride     ()  mmol/L


 


BUN     (7-17)  mg/dL


 


Creatinine     (0.52-1.04)  mg/dL


 


Glucose     (74-99)  mg/dL


 


POC Glucose (mg/dL)  142 H   122 H  (75-99)  mg/dL


 


Hemoglobin A1c     (4.0-6.0)  %


 


Cholesterol     (<200)  mg/dL


 


LDL Cholesterol, Calc     (0-99)  mg/dL


 


HDL Cholesterol     (40-60)  mg/dL


 


Urine Protein   1+ H   (Negative)  


 


Urine Mucus   Rare H   (None)  /hpf














  01/14/19 01/15/19 01/15/19 Range/Units





  20:43 05:41 05:45 


 


RBC    3.45 L  (3.80-5.40)  m/uL


 


Hgb    9.7 L  (11.4-16.0)  gm/dL


 


Hct    30.8 L  (34.0-46.0)  %


 


Chloride     ()  mmol/L


 


BUN     (7-17)  mg/dL


 


Creatinine     (0.52-1.04)  mg/dL


 


Glucose     (74-99)  mg/dL


 


POC Glucose (mg/dL)  160 H  120 H   (75-99)  mg/dL


 


Hemoglobin A1c     (4.0-6.0)  %


 


Cholesterol     (<200)  mg/dL


 


LDL Cholesterol, Calc     (0-99)  mg/dL


 


HDL Cholesterol     (40-60)  mg/dL


 


Urine Protein     (Negative)  


 


Urine Mucus     (None)  /hpf














  01/15/19 Range/Units





  10:00 


 


RBC   (3.80-5.40)  m/uL


 


Hgb   (11.4-16.0)  gm/dL


 


Hct   (34.0-46.0)  %


 


Chloride   ()  mmol/L


 


BUN   (7-17)  mg/dL


 


Creatinine   (0.52-1.04)  mg/dL


 


Glucose   (74-99)  mg/dL


 


POC Glucose (mg/dL)   (75-99)  mg/dL


 


Hemoglobin A1c   (4.0-6.0)  %


 


Cholesterol   (<200)  mg/dL


 


LDL Cholesterol, Calc   (0-99)  mg/dL


 


HDL Cholesterol   (40-60)  mg/dL


 


Urine Protein  1+ H  (Negative)  


 


Urine Mucus   (None)  /hpf








 Microbiology - Last 24 Hours (Table)











 01/14/19 12:40 Urine Culture - Preliminary





 Urine,Voided 


 


 01/14/19 13:45 Nasal Screen MRSA/MSSA - Preliminary





 Nasal Swab 














Assessment and Plan


Plan: 


The patient's echocardiogram confirms an ejection fraction of about 20% with 

global hypokinesis.  Her cardiac catheterization was again reviewed and reveals 

multivessel coronary artery disease including a subtotal LAD occlusion with a 

suboptimal distal target.  I feel that the patient would be better served with 

PCI if possible.  I will again speak with Dr. Tomas regarding my 

recommendations.  I did speak with the patient and her family members at length 

again today.  All of their questions were answered.

## 2019-01-15 NOTE — P.NPCON
History of Present Illness





- Reason for Consult


acute renal failure





- History of Present Illness





Reason for consultation: Acute kidney injury





History of present illness:


Patient is a 68-year-old female seen in renal consultation for acute kidney 

injury.  Unclear as to what her baseline renal function is.  Patient denies any 

prior history of kidney disease.  Creatinine was 1.32 admission and is 1.23 as 

of yesterday.  Patient states she was diagnosed with diabetes in .  She is 

not on insulin at this time.  Denies vomiting or diarrhea.  Denies regular use 

of NSAIDs.  Admits to good urine output.  No hematuria or dysuria.  Patient 

presented to the hospital with dyspnea.  She denies any chest pain.  She does 

admit to swelling in her feet.  Ejection fraction is noted to be 20-25%.  She 

did undergo cardiac catheterization on 2019 which revealed triple-vessel 

disease.  She is now being followed by cardiothoracic surgery for potential 

CABG this admission.  She is maintained on Lasix 40 mg IV twice daily.  

Swelling is improving.  Hemodynamically stable.  Denies family history of renal 

disease.





Vital signs are stable.


General: The patient appeared well nourished and normally developed. 


HEENT: Head exam is unremarkable. Neck is without jugular venous distension.


LUNGS: Lungs are clear to auscultation and percussion. Breath sounds decreased.


HEART: Rate and Rhythm are regular. First and second heart sounds normal. No 

murmurs, rubs or gallops. 


ABDOMEN: Abdominal exam reveals normal bowel sounds. Non-tender and non-

distended. No evidence of peritonitis.


EXTREMITITES: Trace edema.





Past Medical History


Past Medical History: Coronary Artery Disease (CAD), Chest Pain / Angina, 

Diabetes Mellitus, Hyperlipidemia, Myocardial Infarction (MI)


Last Myocardial Infarction Date:: 2006


History of Any Multi-Drug Resistant Organisms: None Reported


Past Surgical History:  Section, Heart Catheterization, Heart 

Catheterization With Stent


Additional Past Surgical History / Comment(s): vein work, leg stent


Past Anesthesia/Blood Transfusion Reactions: No Reported Reaction


Date of Last Stent Placement:: 2006


Past Psychological History: No Psychological Hx Reported


Smoking Status: Former smoker


Past Alcohol Use History: None Reported


Past Drug Use History: None Reported





- Past Family History


  ** Mother


Family Medical History: Coronary Artery Disease (CAD)





Medications and Allergies


 Home Medications











 Medication  Instructions  Recorded  Confirmed  Type


 


Aspirin EC [Ecotrin] 325 mg PO DAILY 18 History


 


Lisinopril [Zestril] 5 mg PO DAILY 18 History


 


Metoprolol Tartrate [Lopressor] 25 mg PO BID 18 History


 


metFORMIN HCL 1,000 mg PO BID 18 History


 


Glimepiride [Amaryl] 2 mg PO DAILY 19 History


 


Oxybutynin Chloride [Oxybutynin 10 mg PO DAILY 19 History





Chloride ER]    











 Allergies











Allergy/AdvReac Type Severity Reaction Status Date / Time


 


tetracycline Allergy  Rash/Hives Verified 19 19:00














Physical Exam


Vitals: 


 Vital Signs











  Temp Pulse Pulse Resp BP Pulse Ox


 


 01/15/19 12:14       95


 


 01/15/19 11:56  97.3 F L   76  18  112/57  96


 


 01/15/19 09:11  97.7 F   92  18  133/80  95


 


 01/15/19 04:00  98 F   86  18  111/60  96


 


 01/15/19 00:00  98.2 F   83  16  119/73  93 L


 


 19 20:00  98.5 F   91  20  108/67  93 L


 


 19 17:00   72   17  124/75  96


 


 19 16:00  98 F  130 H   17  136/80  96








 Intake and Output











 01/14/19 01/15/19 01/15/19





 22:59 06:59 14:59


 


Intake Total 320 250 240


 


Output Total   250


 


Balance 320 250 -10


 


Intake:   


 


  Oral 320 250 240


 


Output:   


 


  Urine   250


 


Other:   


 


  Voiding Method Toilet Toilet 


 


  # Voids 1 1 1


 


  Weight 92.5 kg 89.7 kg 














Results





- Lab Results


 Most recent lab results











Calcium  9.3 mg/dL (8.4-10.2)   19  09:19    


 


Magnesium  1.7 mg/dL (1.6-2.3)   19  09:19    














 01/15/19 05:45





 19 09:19





Assessment and Plan


Plan: 





Assessment:


1.  Acute kidney injury mostly prerenal secondary to cardiorenal syndrome.  

Creatinine 1.23 as of yesterday.  Unclear as to what her baseline renal 

function is.


2.  Proteinuria.  This is likely secondary to underlying diabetic kidney 

disease.


3.  Systolic CHF with ejection fraction of 20-25%.


4.  Volume overload.  Improving.


5.  Anemia.  Rule out iron deficiency.


6.  Diabetes mellitus.


7.  Coronary artery disease.  Status post cardiac catheterization on  

which revealed triple-vessel disease.





Plan:


Maintain Lasix 40 mg IV twice daily.  Can likely transition to oral tomorrow.


Check iron studies.


Avoid nephrotoxins.


Avoid metformin if GFR less than 30.


Continue to monitor renal function and urine output closely.  She is at risk 

for developing contrast-induced nephropathy which typically peaks at 48-72 

hours post dye exposure.


Repeat electrolytes in the morning.





Thank you for the consultation.  I will continue to follow the patient with you 

during her hospital stay.


Time with Patient: Greater than 30

## 2019-01-15 NOTE — PN
PROGRESS NOTE



DATE OF SERVICE:

01/15/2019



PRESENTING COMPLAINT:

Short of breath.



INTERVAL HISTORY:

This patient known coronary artery disease, presents with acute congestive heart

failure exacerbation, acute MI.  Cardiac cath showed severe triple-vessel disease.

Cardiology and cardiothoracic surgery trying to decide what will be the best route,

coronary bypass versus stenting.  Breathing is better.  Did tolerate some diet.

Sitting up.  Family at the bedside.



REVIEW OF SYSTEMS:

Done for constitutional, cardiovascular, GI, pulmonary and relevant findings as above.



CURRENT MEDICATIONS:

Reviewed that include aspirin, Lipitor, p.o. Lasix and Zestril, Glucophage, Lopressor

and Aldactone.



PHYSICAL EXAMINATION:

VITAL SIGNS: Temperature 98, pulse 85, respiratory 18, blood pressure 147/77, pulse ox

96% on room air.

GENERAL APPEARANCE:  Sitting at the edge of bed, looking better.

EYES: Pupils equal. Conjunctivae normal.

NECK:  JVD not raised.  Mass not palpable.

RESPIRATORY:  Effort normal.

LUNGS:  Fair entry.

CARDIOVASCULAR: 1st and 2nd sounds normal.  Decreased edema.

ABDOMEN:  Soft, nontender.  Liver and spleen not palpable.

PSYCHIATRY:  Alert and oriented x3. Mood and affect normal.



INVESTIGATIONS:

BUN 31, creatinine 1.37.



ASSESSMENT:

1. Acute congestive heart failure exacerbation from systolic dysfunction,  ejection

    fraction 20-25% underlying coronary artery disease, now stabilized.

2. Acute non-Q-wave myocardial infarction.

3. Severe triple-vessel disease per cardiac catheterization.

4. Peripheral artery disease.

5. Chronic urine incontinence.

6. Diabetes mellitus type 2 on oral hypoglycemics.

7. Chronic kidney disease stage 3 probably from combination of diabetic nephropathy

    and nephrosclerosis.

8. Metabolic acidosis from renal failure.



PLAN:

Care was discussed with the patient.  Cardiology and cardiothoracic are going to

determine and see what is a better option, stent versus coronary bypass.  We will let

them make a final determination, will go from there.  Otherwise, from the CHF

standpoint, patient is clinically doing better.





MMODL / IJN: [id number] / Job#: [id number]

## 2019-01-16 LAB
ANION GAP SERPL CALC-SCNC: 7 MMOL/L
BASOPHILS # BLD AUTO: 0.1 K/UL (ref 0–0.2)
BASOPHILS NFR BLD AUTO: 1 %
BUN SERPL-SCNC: 34 MG/DL (ref 7–17)
CALCIUM SPEC-MCNC: 9.2 MG/DL (ref 8.4–10.2)
CHLORIDE SERPL-SCNC: 107 MMOL/L (ref 98–107)
CO2 SERPL-SCNC: 28 MMOL/L (ref 22–30)
EOSINOPHIL # BLD AUTO: 0.2 K/UL (ref 0–0.7)
EOSINOPHIL NFR BLD AUTO: 3 %
ERYTHROCYTE [DISTWIDTH] IN BLOOD BY AUTOMATED COUNT: 3.33 M/UL (ref 3.8–5.4)
ERYTHROCYTE [DISTWIDTH] IN BLOOD: 14.9 % (ref 11.5–15.5)
GLUCOSE BLD-MCNC: 146 MG/DL (ref 75–99)
GLUCOSE BLD-MCNC: 174 MG/DL (ref 75–99)
GLUCOSE BLD-MCNC: 205 MG/DL (ref 75–99)
GLUCOSE BLD-MCNC: 208 MG/DL (ref 75–99)
GLUCOSE SERPL-MCNC: 136 MG/DL (ref 74–99)
HCT VFR BLD AUTO: 29.5 % (ref 34–46)
HGB BLD-MCNC: 9.3 GM/DL (ref 11.4–16)
LYMPHOCYTES # SPEC AUTO: 1.4 K/UL (ref 1–4.8)
LYMPHOCYTES NFR SPEC AUTO: 23 %
MCH RBC QN AUTO: 28.1 PG (ref 25–35)
MCHC RBC AUTO-ENTMCNC: 31.7 G/DL (ref 31–37)
MCV RBC AUTO: 88.7 FL (ref 80–100)
MONOCYTES # BLD AUTO: 0.3 K/UL (ref 0–1)
MONOCYTES NFR BLD AUTO: 6 %
NEUTROPHILS # BLD AUTO: 3.8 K/UL (ref 1.3–7.7)
NEUTROPHILS NFR BLD AUTO: 64 %
PLATELET # BLD AUTO: 285 K/UL (ref 150–450)
POTASSIUM SERPL-SCNC: 4.5 MMOL/L (ref 3.5–5.1)
SODIUM SERPL-SCNC: 142 MMOL/L (ref 137–145)
WBC # BLD AUTO: 6 K/UL (ref 3.8–10.6)

## 2019-01-16 RX ADMIN — LISINOPRIL SCH MG: 5 TABLET ORAL at 08:46

## 2019-01-16 RX ADMIN — INSULIN ASPART SCH UNIT: 100 INJECTION, SOLUTION INTRAVENOUS; SUBCUTANEOUS at 22:05

## 2019-01-16 RX ADMIN — METOPROLOL TARTRATE SCH MG: 50 TABLET, FILM COATED ORAL at 08:47

## 2019-01-16 RX ADMIN — INSULIN ASPART SCH UNIT: 100 INJECTION, SOLUTION INTRAVENOUS; SUBCUTANEOUS at 05:59

## 2019-01-16 RX ADMIN — INSULIN ASPART SCH UNIT: 100 INJECTION, SOLUTION INTRAVENOUS; SUBCUTANEOUS at 13:07

## 2019-01-16 RX ADMIN — ASPIRIN 325 MG ORAL TABLET SCH MG: 325 PILL ORAL at 08:46

## 2019-01-16 RX ADMIN — FUROSEMIDE SCH MG: 40 TABLET ORAL at 08:47

## 2019-01-16 RX ADMIN — SPIRONOLACTONE SCH MG: 25 TABLET, FILM COATED ORAL at 08:46

## 2019-01-16 RX ADMIN — METOPROLOL TARTRATE SCH MG: 50 TABLET, FILM COATED ORAL at 20:03

## 2019-01-16 RX ADMIN — OXYBUTYNIN CHLORIDE SCH MG: 10 TABLET, EXTENDED RELEASE ORAL at 08:55

## 2019-01-16 RX ADMIN — INSULIN ASPART SCH UNIT: 100 INJECTION, SOLUTION INTRAVENOUS; SUBCUTANEOUS at 18:42

## 2019-01-16 RX ADMIN — ATORVASTATIN CALCIUM SCH MG: 40 TABLET, FILM COATED ORAL at 08:46

## 2019-01-16 RX ADMIN — SODIUM FERRIC GLUCONATE COMPLEX SCH MLS/HR: 12.5 INJECTION INTRAVENOUS at 18:41

## 2019-01-16 NOTE — P.PN
Subjective


Progress Note Date: 01/16/19


Principal diagnosis: 





Severe triple vessel coronary artery disease, ischemic cardiomyopathy with EF 20

-25% and global hypokinesia of the left ventricle.  Previous medical history of 

myocardial infarction in 2006 with stent placement, hyperlipidemia, peripheral 

artery disease with stent placement to the left lower extremity in 2016, recent 

vein surgery to bilateral lower extremities, uncontrolled non-insulin-dependent 

diabetes mellitus with hemoglobin A1c 7.1%, shingles, obesity, previous tobacco 

dependence, moderate COPD with preoperative FEV1 51% of predicted, family 

history of early coronary artery disease.





The patient is currently sitting up in bed in no acute distress.  Denies any 

chest pain or shortness of breath.  Has ambulated around the hallway without 

any difficulty.  No new questions at this time.  





Objective





- Vital Signs


Vital signs: 


 Vital Signs











Temp  98.1 F   01/16/19 04:00


 


Pulse  91   01/16/19 04:00


 


Resp  18   01/16/19 04:00


 


BP  125/57   01/16/19 04:00


 


Pulse Ox  94 L  01/16/19 04:00








 Intake & Output











 01/15/19 01/16/19 01/16/19





 18:59 06:59 18:59


 


Intake Total 720 650 


 


Output Total 600 1700 


 


Balance 120 -1050 


 


Weight  88.5 kg 


 


Intake:   


 


  Oral 720  


 


  Blood Product  650 


 


Output:   


 


  Urine 600 1700 


 


Other:   


 


  Voiding Method Toilet Toilet 


 


  # Voids 1 3 














- Constitutional


General appearance: Present: cooperative, no acute distress, obese





- Respiratory


Details: 





Lungs sounds diminished bilaterally.  Respirations even, nonlabored.  Currently 

on room air with oxygen saturation 94%.  Strong cough.  Able to achieve 1000 mL 

on her incentive spirometry.





- Cardiovascular


Details: 





S1, S2 present.  Regular rate and rhythm, sinus rhythm on telemetry.  Palpable 

peripheral pulses bilaterally.  Trace bilateral lower extremity nonpitting 

edema present.  No calf pain or tenderness noted.  Right femoral artery heart 

catheterization site soft, nontender.





- Gastrointestinal


Gastrointestinal Comment(s): 





Abdomen soft, nontender, nondistended, obese.  Active bowel sounds present 4 

quadrants.  Tolerating diet.








- Genitourinary


Genitourinary Comment(s): 





Continues to void clear, yellow urine.





- Integumentary


Integumentary Comment(s): 





Skin is warm and dry with evidence of good perfusion.








- Neurologic


Neurologic: Present: CNII-XII intact





- Musculoskeletal


Musculoskeletal: Present: gait normal, strength equal bilaterally





- Psychiatric


Psychiatric: Present: A&O x's 3, appropriate affect, intact judgment & insight





- Allied health notes


Allied health notes reviewed: nursing





- Labs


CBC & Chem 7: 


 01/16/19 06:00





 01/16/19 06:00


Labs: 


 Abnormal Lab Results - Last 24 Hours (Table)











  01/15/19 01/15/19 01/15/19 Range/Units





  10:00 11:44 13:13 


 


RBC     (3.80-5.40)  m/uL


 


Hgb     (11.4-16.0)  gm/dL


 


Hct     (34.0-46.0)  %


 


BUN    31 H  (7-17)  mg/dL


 


Creatinine    1.37 H  (0.52-1.04)  mg/dL


 


Glucose    177 H  (74-99)  mg/dL


 


POC Glucose (mg/dL)   193 H   (75-99)  mg/dL


 


Iron     ()  ug/dL


 


Iron Saturation     (12.00-45.00)   


 


Urine Protein  1+ H    (Negative)  














  01/15/19 01/15/19 01/15/19 Range/Units





  13:13 16:17 21:17 


 


RBC     (3.80-5.40)  m/uL


 


Hgb     (11.4-16.0)  gm/dL


 


Hct     (34.0-46.0)  %


 


BUN     (7-17)  mg/dL


 


Creatinine     (0.52-1.04)  mg/dL


 


Glucose     (74-99)  mg/dL


 


POC Glucose (mg/dL)   203 H  218 H  (75-99)  mg/dL


 


Iron  34 L    ()  ug/dL


 


Iron Saturation  10.09 L    (12.00-45.00)   


 


Urine Protein     (Negative)  














  01/16/19 01/16/19 01/16/19 Range/Units





  05:43 06:00 06:00 


 


RBC   3.33 L   (3.80-5.40)  m/uL


 


Hgb   9.3 L   (11.4-16.0)  gm/dL


 


Hct   29.5 L   (34.0-46.0)  %


 


BUN    34 H  (7-17)  mg/dL


 


Creatinine    1.54 H  (0.52-1.04)  mg/dL


 


Glucose    136 H  (74-99)  mg/dL


 


POC Glucose (mg/dL)  146 H    (75-99)  mg/dL


 


Iron     ()  ug/dL


 


Iron Saturation     (12.00-45.00)   


 


Urine Protein     (Negative)  








 Microbiology - Last 24 Hours (Table)











 01/14/19 13:45 Nasal Screen MRSA/MSSA - Final





 Nasal Swab    Staphylococcus aureus,Not MRSA


 


 01/14/19 12:40 Urine Culture - Final





 Urine,Voided 














Assessment and Plan


(1) History of myocardial infarction, greater than 8 weeks ago


Current Visit: No   Status: Resolved   Code(s): I25.2 - OLD MYOCARDIAL 

INFARCTION   SNOMED Code(s): 5922793


   





(2) Non-STEMI (non-ST elevated myocardial infarction)


Current Visit: Yes   Status: Acute   Code(s): I21.4 - NON-ST ELEVATION (NSTEMI) 

MYOCARDIAL INFARCTION   SNOMED Code(s): 61470429


   





(3) History of heart artery stent


Current Visit: Yes   Status: Chronic   Code(s): Z95.5 - PRESENCE OF CORONARY 

ANGIOPLASTY IMPLANT AND GRAFT   SNOMED Code(s): 077373140


   





(4) Peripheral artery disease


Current Visit: Yes   Status: Chronic   Code(s): I73.9 - PERIPHERAL VASCULAR 

DISEASE, UNSPECIFIED   SNOMED Code(s): 954366844


   





(5) Tobacco dependence in remission


Current Visit: No   Status: Resolved   Code(s): F17.201 - NICOTINE DEPENDENCE, 

UNSPECIFIED, IN REMISSION   SNOMED Code(s): 471219154


   





(6) Hyperlipidemia


Current Visit: Yes   Status: Chronic   Code(s): E78.5 - HYPERLIPIDEMIA, 

UNSPECIFIED   SNOMED Code(s): 42191129


   





(7) Diabetes mellitus


Current Visit: Yes   Status: Chronic   Code(s): E11.9 - TYPE 2 DIABETES 

MELLITUS WITHOUT COMPLICATIONS   SNOMED Code(s): 04457904


   





(8) Obesity (BMI 30.0-34.9)


Current Visit: Yes   Status: Chronic   Code(s): E66.9 - OBESITY, UNSPECIFIED   

SNOMED Code(s): 416508970350642


   





(9) Iron deficiency anemia


Current Visit: Yes   Status: Chronic   Code(s): D50.9 - IRON DEFICIENCY ANEMIA, 

UNSPECIFIED   SNOMED Code(s): 59828197


   





(10) COPD (chronic obstructive pulmonary disease)


Current Visit: Yes   Status: Chronic   Code(s): J44.9 - CHRONIC OBSTRUCTIVE 

PULMONARY DISEASE, UNSPECIFIED   SNOMED Code(s): 24763263


   





(11) Systolic heart failure


Current Visit: Yes   Status: Acute   Code(s): I50.20 - UNSPECIFIED SYSTOLIC (

CONGESTIVE) HEART FAILURE   SNOMED Code(s): 585423108


   


Plan: 





1.  Continue aspirin, statin, beta blocker, ACE inhibitor, Lasix, Aldactone.


2.  Encourage incentive spirometry 10 times every hour while awake.


3.  Increase activity, ambulate in hallway.


4.  STS risk score calculated and discussed with the patient.


5.  Patient's distal targets are suboptimal, saphenous veins are usable for 

conduit, radial artery studies  completed with possibility of good conduit,  

echocardiogram demonstrates significant LV dysfunction with ejection fraction 20

% and global hypokinesis of the left ventricle.  This patient would be high 

risk.   Dr. Elise reviewed her heart catheterization and feels she would 

benefit more from bypass then PCI.  Will have Dr. Hoskins review her case today.

  


6.  Continue medical management per Dr. King, cardiology management per Dr. Tomas.


7.  Nephrology ordered per primary care, appreciate recommendations.  Avoid 

nephrotoxins.  


8.  More recommendations to follow.





Time with Patient: Greater than 30

## 2019-01-16 NOTE — P.PN
Subjective





Patient is seen in follow-up for acute kidney injury.  Creatinine was 1.3 on 

admission and is 1.54 today.  Currently resting in bed.  Edema has improved.  

IV Lasix has been discontinued.  Admits to good urine output.  No active chest 

pain or shortness of breath.





Vital signs are stable.


General: The patient appeared well nourished and normally developed. 


HEENT: Head exam is unremarkable. Neck is without jugular venous distension.


LUNGS: Lungs are clear to auscultation and percussion. Breath sounds decreased.


HEART: Rate and Rhythm are regular. First and second heart sounds normal. No 

murmurs, rubs or gallops. 


ABDOMEN: Abdominal exam reveals normal bowel sounds. Non-tender and non-

distended. No evidence of peritonitis.


EXTREMITITES: No clubbing, cyanosis, or edema.





Objective





- Vital Signs


Vital signs: 


 Vital Signs











Temp  98.5 F   01/16/19 08:00


 


Pulse  98   01/16/19 08:00


 


Resp  18   01/16/19 04:00


 


BP  134/65   01/16/19 08:00


 


Pulse Ox  91 L  01/16/19 08:00








 Intake & Output











 01/15/19 01/16/19 01/16/19





 18:59 06:59 18:59


 


Intake Total 720 650 240


 


Output Total 600 1700 


 


Balance 120 -1050 240


 


Weight  88.5 kg 


 


Intake:   


 


  Oral 720  240


 


  Blood Product  650 


 


Output:   


 


  Urine 600 1700 


 


Other:   


 


  Voiding Method Toilet Toilet 


 


  # Voids 1 3 200














- Labs


CBC & Chem 7: 


 01/16/19 06:00





 01/16/19 06:00


Labs: 


 Abnormal Lab Results - Last 24 Hours (Table)











  01/15/19 01/15/19 01/15/19 Range/Units





  11:44 13:13 13:13 


 


RBC     (3.80-5.40)  m/uL


 


Hgb     (11.4-16.0)  gm/dL


 


Hct     (34.0-46.0)  %


 


BUN   31 H   (7-17)  mg/dL


 


Creatinine   1.37 H   (0.52-1.04)  mg/dL


 


Glucose   177 H   (74-99)  mg/dL


 


POC Glucose (mg/dL)  193 H    (75-99)  mg/dL


 


Iron    34 L  ()  ug/dL


 


Iron Saturation    10.09 L  (12.00-45.00)   














  01/15/19 01/15/19 01/16/19 Range/Units





  16:17 21:17 05:43 


 


RBC     (3.80-5.40)  m/uL


 


Hgb     (11.4-16.0)  gm/dL


 


Hct     (34.0-46.0)  %


 


BUN     (7-17)  mg/dL


 


Creatinine     (0.52-1.04)  mg/dL


 


Glucose     (74-99)  mg/dL


 


POC Glucose (mg/dL)  203 H  218 H  146 H  (75-99)  mg/dL


 


Iron     ()  ug/dL


 


Iron Saturation     (12.00-45.00)   














  01/16/19 01/16/19 01/16/19 Range/Units





  06:00 06:00 11:30 


 


RBC  3.33 L    (3.80-5.40)  m/uL


 


Hgb  9.3 L    (11.4-16.0)  gm/dL


 


Hct  29.5 L    (34.0-46.0)  %


 


BUN   34 H   (7-17)  mg/dL


 


Creatinine   1.54 H   (0.52-1.04)  mg/dL


 


Glucose   136 H   (74-99)  mg/dL


 


POC Glucose (mg/dL)    205 H  (75-99)  mg/dL


 


Iron     ()  ug/dL


 


Iron Saturation     (12.00-45.00)   








 Microbiology - Last 24 Hours (Table)











 01/14/19 13:45 Nasal Screen MRSA/MSSA - Final





 Nasal Swab    Staphylococcus aureus,Not MRSA


 


 01/14/19 12:40 Urine Culture - Final





 Urine,Voided 














Assessment and Plan


Plan: 





Assessment:


1.  Acute kidney injury mostly prerenal secondary to cardiorenal syndrome.  

Creatinine was 1.3 on admission and is 1.54 today which is due to diuresis and 

contrast-induced nephropathy.  Unclear as to what her baseline renal function 

is.


2.  Proteinuria.  This is likely secondary to underlying diabetic kidney 

disease.


3.  Systolic CHF with ejection fraction of 20-25%.


4.  Volume overload.  Improved.


5.  Anemia.  Iron deficiency noted.


6.  Diabetes mellitus.


7.  Coronary artery disease.  Status post cardiac catheterization on January 14 

which revealed triple-vessel disease.  She is being evaluated by cardiothoracic 

surgery.





Plan:


Hold Lasix for now.


Ferrlecit 125 mg IV daily for 3 days.  First dose today.


Avoid nephrotoxins.


Avoid metformin if GFR less than 30.


Continue to monitor renal function and urine output closely. 


Repeat electrolytes in the morning.

## 2019-01-16 NOTE — P.PN
Subjective


Progress Note Date: 01/16/19


This is a 68-year-old  female who was admitted to the hospital with a 

non-ST elevation myocardial infarction, she underwent a cardiac catheterization 

which revealed severe triple-vessel coronary artery disease.  Patient has 

ischemic myopathy with severe LV dysfunction, she had been evaluated by 

cardiothoracic surgery and felt not to be an ideal candidate for bypass 

surgery.  Dr. Elise the on-call interventionist also reviewed angiographic 

data felt that the patient would benefit more from bypass surgery then catheter-

based revascularization.  Dr. Hoskins is scheduled to be at the hospital today, 

he will review the data and give his input and opinion.  Overall the patient 

feels well, she is ambulated in the [facility] today, no shortness of breath, no 

chest discomfort.  Blood pressure 124/56 this morning, heart rate in the 80s to 

90s, 94% on room air.  White blood cell count 6.0, hemoglobin 9.3, platelet 

count 285.  Sodium 142, potassium 4.5, BUN 34, and creatinine 1.5.








Objective





- Vital Signs


Vital signs: 


 Vital Signs











Temp  98.1 F   01/16/19 04:00


 


Pulse  91   01/16/19 04:00


 


Resp  18   01/16/19 04:00


 


BP  125/57   01/16/19 04:00


 


Pulse Ox  94 L  01/16/19 04:00








 Intake & Output











 01/15/19 01/16/19 01/16/19





 18:59 06:59 18:59


 


Intake Total 720 650 240


 


Output Total 600 1700 


 


Balance 120 -1050 240


 


Weight  88.5 kg 


 


Intake:   


 


  Oral 720  240


 


  Blood Product  650 


 


Output:   


 


  Urine 600 1700 


 


Other:   


 


  Voiding Method Toilet Toilet 


 


  # Voids 1 3 200














- Exam





PHYSICAL EXAMINATION: 





GENERAL: 68-year-old  female in no acute distress at the time of my 

examination





HEENT: Head is atraumatic, normocephalic.  Pupils equal, round.  Sclera 

anicteric. Conjunctiva are clear.  Mucous membranes of the mouth are moist.  

Neck is supple.  There is no elevated jugular venous pressure.  No carotid  

bruit is heard.





HEART EXAMINATION: Heart S1, S2 normal.  No murmur or gallop heard.





CHEST EXAMINATION: Lungs are clear to auscultation and precussion. No chest 

wall tenderness is noted on palpation or with deep breathing.





ABDOMEN:  Soft, nontender. Bowel sounds are heard. No organomegaly noted.


 


EXTREMITIES: 2+ peripheral pulses with no evidence of peripheral edema and no 

calf tenderness noted.





NEUROLOGIC patient is awake, alert and oriented 3.


 


.


 








- Labs


CBC & Chem 7: 


 01/16/19 06:00





 01/16/19 06:00


Labs: 


 Abnormal Lab Results - Last 24 Hours (Table)











  01/15/19 01/15/19 01/15/19 Range/Units





  10:00 11:44 13:13 


 


RBC     (3.80-5.40)  m/uL


 


Hgb     (11.4-16.0)  gm/dL


 


Hct     (34.0-46.0)  %


 


BUN    31 H  (7-17)  mg/dL


 


Creatinine    1.37 H  (0.52-1.04)  mg/dL


 


Glucose    177 H  (74-99)  mg/dL


 


POC Glucose (mg/dL)   193 H   (75-99)  mg/dL


 


Iron     ()  ug/dL


 


Iron Saturation     (12.00-45.00)   


 


Urine Protein  1+ H    (Negative)  














  01/15/19 01/15/19 01/15/19 Range/Units





  13:13 16:17 21:17 


 


RBC     (3.80-5.40)  m/uL


 


Hgb     (11.4-16.0)  gm/dL


 


Hct     (34.0-46.0)  %


 


BUN     (7-17)  mg/dL


 


Creatinine     (0.52-1.04)  mg/dL


 


Glucose     (74-99)  mg/dL


 


POC Glucose (mg/dL)   203 H  218 H  (75-99)  mg/dL


 


Iron  34 L    ()  ug/dL


 


Iron Saturation  10.09 L    (12.00-45.00)   


 


Urine Protein     (Negative)  














  01/16/19 01/16/19 01/16/19 Range/Units





  05:43 06:00 06:00 


 


RBC   3.33 L   (3.80-5.40)  m/uL


 


Hgb   9.3 L   (11.4-16.0)  gm/dL


 


Hct   29.5 L   (34.0-46.0)  %


 


BUN    34 H  (7-17)  mg/dL


 


Creatinine    1.54 H  (0.52-1.04)  mg/dL


 


Glucose    136 H  (74-99)  mg/dL


 


POC Glucose (mg/dL)  146 H    (75-99)  mg/dL


 


Iron     ()  ug/dL


 


Iron Saturation     (12.00-45.00)   


 


Urine Protein     (Negative)  








 Microbiology - Last 24 Hours (Table)











 01/14/19 13:45 Nasal Screen MRSA/MSSA - Final





 Nasal Swab    Staphylococcus aureus,Not MRSA


 


 01/14/19 12:40 Urine Culture - Final





 Urine,Voided 














Assessment and Plan


Plan: 





Assessment and plan


#1 non-ST elevation myocardial infarction, status post cardiac catheterization 

which revealed severe triple-vessel coronary artery disease


#2 ischemic cardiomyopathy with severe left ventricular dysfunction


#3 hyperlipidemia


#4 diabetes








Plan


Patient had been evaluated by cardiothoracic surgeon who felt her not to be a 

candidate for bypass surgery, Dr. Elise the on-call interventionist review the 

angiographic data and felt that the patient would probably benefit more from 

bypass surgery then catheter-based revascularization.  Dr. Hoskins is rounding 

today, he will review the angiographic data give this recommendation and 

opinion.  Further recommendations then will be made.





DNP note has been reviewed, I agree with a documented findings and plan of 

care.  Patient was seen and examined.

## 2019-01-16 NOTE — P.ARTDOP
Arterial Doppler





Bilateral radial artery studies:





Date of study: 01/15/2019.





Reason for study: Preop CABG.





On Doppler assessment we find no significant right to left or segmental 

pressure gradients.





On digital plethysmography with radial artery compression, there is a 44 mmHg 

change on the right, but no significant change in pressure on the left.





Imaging shows the right radial to vary between 2 x 1.9 mm and 3.5 x 3.3 mm in 

diameter.  The left ranges in size between 2.6 x 2 0.5 mmHg distally and 5.9 x 

4.9 mm proximally.  There is some intimal thickening seen in the right radial 

distally and the left radial proximally.





Impression: The left radial artery appears to be usable.  The right radial is 

not based on inadequate collateralization.

## 2019-01-16 NOTE — P.VSCSTY
Greater Saphenous Vein Mapping





This is bilateral lower extremity greater saphenous vein mapping.





Date of service 01/13/2019





Vein quality and ultrasound appearance note that both lesser saphenous veins 

are too small to characterize or use.  There is thrombosis throughout the left 

greater saphenous and patchy thrombus throughout the right greater saphenous 

area





Vein size  groin right [ ]                                          groin left 

[ ]





                High thigh right [ ]                                 high thigh 

left  [ ]





                Mid thigh right  3 x 4                                  mid 

thigh left [ ]





                 Above-knee right  2.9 x 4.1                             above-

knee left [ ]





                  Below knee right 3.3 x 4.4                                  

below-knee left []





                   Mid calf right []                                        mid 

calf left []





                    Ankle right []                                           

ankle left []





Impression there does not appear to be lesser or greater saphenous vein usable 

in all other side.  The area just above and below the knee on the right is 

patent but probably postphlebitic.

## 2019-01-17 VITALS — DIASTOLIC BLOOD PRESSURE: 55 MMHG | SYSTOLIC BLOOD PRESSURE: 117 MMHG | HEART RATE: 81 BPM

## 2019-01-17 VITALS — RESPIRATION RATE: 16 BRPM

## 2019-01-17 VITALS — TEMPERATURE: 96.9 F

## 2019-01-17 LAB
ANION GAP SERPL CALC-SCNC: 8 MMOL/L
BASOPHILS # BLD AUTO: 0.1 K/UL (ref 0–0.2)
BASOPHILS NFR BLD AUTO: 1 %
BUN SERPL-SCNC: 37 MG/DL (ref 7–17)
CALCIUM SPEC-MCNC: 9 MG/DL (ref 8.4–10.2)
CHLORIDE SERPL-SCNC: 106 MMOL/L (ref 98–107)
CO2 SERPL-SCNC: 27 MMOL/L (ref 22–30)
EOSINOPHIL # BLD AUTO: 0.2 K/UL (ref 0–0.7)
EOSINOPHIL NFR BLD AUTO: 4 %
ERYTHROCYTE [DISTWIDTH] IN BLOOD BY AUTOMATED COUNT: 3.24 M/UL (ref 3.8–5.4)
ERYTHROCYTE [DISTWIDTH] IN BLOOD: 15 % (ref 11.5–15.5)
GLUCOSE BLD-MCNC: 122 MG/DL (ref 75–99)
GLUCOSE BLD-MCNC: 189 MG/DL (ref 75–99)
GLUCOSE BLD-MCNC: 82 MG/DL (ref 75–99)
GLUCOSE SERPL-MCNC: 107 MG/DL (ref 74–99)
HCT VFR BLD AUTO: 29.3 % (ref 34–46)
HGB BLD-MCNC: 9.2 GM/DL (ref 11.4–16)
LYMPHOCYTES # SPEC AUTO: 1.6 K/UL (ref 1–4.8)
LYMPHOCYTES NFR SPEC AUTO: 29 %
MAGNESIUM SPEC-SCNC: 1.9 MG/DL (ref 1.6–2.3)
MCH RBC QN AUTO: 28.4 PG (ref 25–35)
MCHC RBC AUTO-ENTMCNC: 31.4 G/DL (ref 31–37)
MCV RBC AUTO: 90.4 FL (ref 80–100)
MONOCYTES # BLD AUTO: 0.3 K/UL (ref 0–1)
MONOCYTES NFR BLD AUTO: 5 %
NEUTROPHILS # BLD AUTO: 3.1 K/UL (ref 1.3–7.7)
NEUTROPHILS NFR BLD AUTO: 57 %
PLATELET # BLD AUTO: 276 K/UL (ref 150–450)
POTASSIUM SERPL-SCNC: 4.4 MMOL/L (ref 3.5–5.1)
SODIUM SERPL-SCNC: 141 MMOL/L (ref 137–145)
WBC # BLD AUTO: 5.5 K/UL (ref 3.8–10.6)

## 2019-01-17 RX ADMIN — INSULIN ASPART SCH: 100 INJECTION, SOLUTION INTRAVENOUS; SUBCUTANEOUS at 11:47

## 2019-01-17 RX ADMIN — ATORVASTATIN CALCIUM SCH MG: 40 TABLET, FILM COATED ORAL at 08:49

## 2019-01-17 RX ADMIN — METOPROLOL TARTRATE SCH MG: 50 TABLET, FILM COATED ORAL at 08:49

## 2019-01-17 RX ADMIN — SPIRONOLACTONE SCH MG: 25 TABLET, FILM COATED ORAL at 08:49

## 2019-01-17 RX ADMIN — SODIUM FERRIC GLUCONATE COMPLEX SCH MLS/HR: 12.5 INJECTION INTRAVENOUS at 08:56

## 2019-01-17 RX ADMIN — OXYBUTYNIN CHLORIDE SCH MG: 10 TABLET, EXTENDED RELEASE ORAL at 08:56

## 2019-01-17 RX ADMIN — ASPIRIN 325 MG ORAL TABLET SCH MG: 325 PILL ORAL at 08:50

## 2019-01-17 RX ADMIN — INSULIN ASPART SCH: 100 INJECTION, SOLUTION INTRAVENOUS; SUBCUTANEOUS at 05:40

## 2019-01-17 RX ADMIN — LISINOPRIL SCH MG: 5 TABLET ORAL at 08:50

## 2019-01-17 NOTE — PN
PROGRESS NOTE



DATE OF SERVICE:

01/16/2019.



PRESENTING COMPLAINT:

Tired.



INTERVAL HISTORY:

This patient with known coronary artery presented with acute congestive heart failure

exacerbation and acute MI. Cardiac cath revealing severe triple-vessel disease.

Breathing is much improved.   at the bedside.  I saw the patient this morning.

At this point, Cardiology and Cardiothoracic have not made a final determination

whether to proceed with a stent or with coronary bypass.  Breathing is better.

Tolerating a diet.



REVIEW OF SYSTEMS:

Done for constitutional, cardiovascular, GI, pulmonary; relevant findings as above.



CURRENT MEDICATIONS:

Reviewed.



PHYSICAL EXAMINATION:

Temperature 98.5, pulse 98, respirations 16, blood pressure 130/65, pulse 91 percent on

room air.

GENERAL APPEARANCE: Sitting up at the edge of bed, breathing better.

EYES: Pupils equal. Conjunctivae normal.

NECK: JVD not raised. Mass not palpable. Respiratory effort normal.

LUNGS: Improved air entry.

CARDIOVASCULAR: 1st and 2nd sounds. Minimal edema.

ABDOMEN:  Soft, nontender.  Liver and spleen not palpable.

PSYCHIATRY:  Alert and oriented x3.  Mood and affect normal.



INVESTIGATIONS:

White count 16, hemoglobin 9.3, potassium 4.5, BUN 34, creatinine 1.54.



ASSESSMENT:

1. Acute congestive heart failure exacerbation from systolic dysfunction, ejection

    fraction of 20% to 25% with underlying coronary artery disease.

2. Pending decision about coronary bypass versus stenting.

3. Acute non-Q-wave myocardial infarction.

4. Severe triple-vessel disease per cardiac catheterization.

5. Peripheral artery disease.

6. Chronic urine incontinence.

7. Diabetes mellitus type 2 on oral hypoglycemic.

8. Chronic kidney disease stage III, probably from combination of diabetic nephropathy

    and nephrosclerosis.

9. Metabolic acidosis from renal failure.

10.Acute renal failure, could be contrast induced nephropathy with creatinine creeping

    up.



PLAN:

Continue current medication and treatment plan. The patient is being followed by

Nephrology.  Keep a very close eye on the renal function.  Care was discussed with the

patient and .





MMODL / IJN: [id number] / Job#: [id number]

## 2019-01-17 NOTE — P.PN
Subjective


Progress Note Date: 01/17/19


Principal diagnosis: 





Severe triple vessel coronary artery disease, ischemic cardiomyopathy with EF 20

-25% and global hypokinesia of the left ventricle.  Previous medical history of 

myocardial infarction in 2006 with stent placement, hyperlipidemia, peripheral 

artery disease with stent placement to the left lower extremity in 2016, recent 

vein surgery to bilateral lower extremities, uncontrolled non-insulin-dependent 

diabetes mellitus with hemoglobin A1c 7.1%, shingles, obesity, previous tobacco 

dependence, moderate COPD with preoperative FEV1 51% of predicted, family 

history of early coronary artery disease.  Iron deficiency anemia.  

Preoperative nasal swab positive for MSSA.  Acute kidney injury.





The patient is currently sitting up in bed in no acute distress.  Denies any 

chest pain or shortness of breath.  Has ambulated around the hallway without 

any difficulty.  Heart rate is elevated in the 130s this morning, still sinus.  

Patient is very anxious about her upcoming surgery.  Time spent at the bedside 

answering multiple questions from the patient and her .  





Objective





- Vital Signs


Vital signs: 


 Vital Signs











Temp  97.6 F   01/16/19 19:56


 


Pulse  94   01/17/19 05:15


 


Resp  16   01/17/19 05:15


 


BP  112/55   01/17/19 05:15


 


Pulse Ox  92 L  01/17/19 05:15








 Intake & Output











 01/16/19 01/17/19 01/17/19





 18:59 06:59 18:59


 


Intake Total 720  


 


Output Total 200 700 


 


Balance 520 -700 


 


Weight  89.8 kg 


 


Intake:   


 


  Oral 720  


 


Output:   


 


  Urine 200 700 


 


Other:   


 


  Voiding Method  Toilet 


 


  # Voids 2 1 














- Constitutional


General appearance: Present: cooperative, no acute distress, obese





- Respiratory


Details: 





Lungs sounds diminished bilaterally.  Respirations even, nonlabored.  Currently 

on room air with oxygen saturation 92%.  Strong cough.  Able to achieve 1250 mL 

on her incentive spirometry.








- Cardiovascular


Details: 





S1, S2 present.  Tachycardic but regular rate and rhythm, sinus tach on 

telemetry with heart rate in the 130s to 140s.  Palpable peripheral pulses 

bilaterally.  Trace bilateral lower extremity nonpitting edema present.  No 

calf pain or tenderness noted. 





- Gastrointestinal


Gastrointestinal Comment(s): 





Abdomen soft, nontender, nondistended, obese.  Active bowel sounds present 4 

quadrants.  Tolerating diet.





- Genitourinary


Genitourinary Comment(s): 





Continues to void clear, yellow urine.





- Integumentary


Integumentary Comment(s): 





Skin is warm and dry with evidence of good perfusion.





- Neurologic


Neurologic: Present: CNII-XII intact





- Musculoskeletal


Musculoskeletal: Present: gait normal, strength equal bilaterally





- Psychiatric


Psychiatric: Present: A&O x's 3, appropriate affect, intact judgment & insight





- Allied health notes


Allied health notes reviewed: nursing





- Labs


CBC & Chem 7: 


 01/17/19 06:03





 01/17/19 06:03


Labs: 


 Abnormal Lab Results - Last 24 Hours (Table)











  01/16/19 01/16/19 01/16/19 Range/Units





  11:30 16:57 21:05 


 


RBC     (3.80-5.40)  m/uL


 


Hgb     (11.4-16.0)  gm/dL


 


Hct     (34.0-46.0)  %


 


BUN     (7-17)  mg/dL


 


Creatinine     (0.52-1.04)  mg/dL


 


Glucose     (74-99)  mg/dL


 


POC Glucose (mg/dL)  205 H  174 H  208 H  (75-99)  mg/dL














  01/17/19 01/17/19 01/17/19 Range/Units





  05:35 06:03 06:03 


 


RBC   3.24 L   (3.80-5.40)  m/uL


 


Hgb   9.2 L   (11.4-16.0)  gm/dL


 


Hct   29.3 L   (34.0-46.0)  %


 


BUN    37 H  (7-17)  mg/dL


 


Creatinine    1.53 H  (0.52-1.04)  mg/dL


 


Glucose    107 H  (74-99)  mg/dL


 


POC Glucose (mg/dL)  122 H    (75-99)  mg/dL














Assessment and Plan


(1) History of myocardial infarction, greater than 8 weeks ago


Current Visit: No   Status: Resolved   Code(s): I25.2 - OLD MYOCARDIAL 

INFARCTION   SNOMED Code(s): 5368016


   





(2) Non-STEMI (non-ST elevated myocardial infarction)


Current Visit: Yes   Status: Acute   Code(s): I21.4 - NON-ST ELEVATION (NSTEMI) 

MYOCARDIAL INFARCTION   SNOMED Code(s): 13281818


   





(3) History of heart artery stent


Current Visit: Yes   Status: Chronic   Code(s): Z95.5 - PRESENCE OF CORONARY 

ANGIOPLASTY IMPLANT AND GRAFT   SNOMED Code(s): 060663967


   





(4) Peripheral artery disease


Current Visit: Yes   Status: Chronic   Code(s): I73.9 - PERIPHERAL VASCULAR 

DISEASE, UNSPECIFIED   SNOMED Code(s): 497209882


   





(5) Tobacco dependence in remission


Current Visit: No   Status: Resolved   Code(s): F17.201 - NICOTINE DEPENDENCE, 

UNSPECIFIED, IN REMISSION   SNOMED Code(s): 242720158


   





(6) Hyperlipidemia


Current Visit: Yes   Status: Chronic   Code(s): E78.5 - HYPERLIPIDEMIA, 

UNSPECIFIED   SNOMED Code(s): 45372465


   





(7) Diabetes mellitus


Current Visit: Yes   Status: Chronic   Code(s): E11.9 - TYPE 2 DIABETES 

MELLITUS WITHOUT COMPLICATIONS   SNOMED Code(s): 85350585


   





(8) Obesity (BMI 30.0-34.9)


Current Visit: Yes   Status: Chronic   Code(s): E66.9 - OBESITY, UNSPECIFIED   

SNOMED Code(s): 838396963890540


   





(9) Iron deficiency anemia


Current Visit: Yes   Status: Chronic   Code(s): D50.9 - IRON DEFICIENCY ANEMIA, 

UNSPECIFIED   SNOMED Code(s): 47816634


   





(10) COPD (chronic obstructive pulmonary disease)


Current Visit: Yes   Status: Chronic   Code(s): J44.9 - CHRONIC OBSTRUCTIVE 

PULMONARY DISEASE, UNSPECIFIED   SNOMED Code(s): 83757296


   





(11) Systolic heart failure


Current Visit: Yes   Status: Acute   Code(s): I50.20 - UNSPECIFIED SYSTOLIC (

CONGESTIVE) HEART FAILURE   SNOMED Code(s): 335371450


   


Plan: 





1.  Continue aspirin, statin, beta blocker, ACE inhibitor, Aldactone.


2.  Encourage incentive spirometry 10 times every hour while awake.


3.  Increase activity, ambulate in hallway.


4.  STS risk score calculated and discussed with the patient.


5.  Plan is for off-pump coronary artery bypass surgery on Thursday, 01/24/ 2019.  This was discussed with the patient and her , and they're 

agreeable.


6.  Continue preoperative teaching.


7.  Nasal swab positive for MSSA, will initiate mupirocin.


8.  Patient/ with multiple questions regarding diet.  Dietitian consult 

placed.


9.  Continue medical management per Dr. King, cardiology management per Dr. Tomas.


10.  Nephrology ordered per primary care, appreciate recommendations.  Avoid 

nephrotoxins.  IV iron added for 3 days.


11.  Patient may be discharged home from our standpoint when okay with other 

consultants to return next Thursday for surgery.


Time with Patient: Greater than 30

## 2019-01-17 NOTE — P.PN
Subjective





Patient is seen in follow-up for acute kidney injury.  Creatinine was 1.3 on 

admission and is stable at 1.53 today.  Currently resting in bed.  Edema has 

improved.  IV Lasix has been discontinued.  Admits to good urine output.  No 

active chest pain or shortness of breath.  Scheduled for CABG on Thursday.





Vital signs are stable.


General: The patient appeared well nourished and normally developed. 


HEENT: Head exam is unremarkable. Neck is without jugular venous distension.


LUNGS: Lungs are clear to auscultation and percussion. Breath sounds decreased.


HEART: Rate and Rhythm are regular. First and second heart sounds normal. No 

murmurs, rubs or gallops. 


ABDOMEN: Abdominal exam reveals normal bowel sounds. Non-tender and non-

distended. No evidence of peritonitis.


EXTREMITITES: No clubbing, cyanosis, or edema.





Objective





- Vital Signs


Vital signs: 


 Vital Signs











Temp  97.6 F   01/16/19 19:56


 


Pulse  94   01/17/19 05:15


 


Resp  16   01/17/19 05:15


 


BP  112/55   01/17/19 05:15


 


Pulse Ox  92 L  01/17/19 05:15








 Intake & Output











 01/16/19 01/17/19 01/17/19





 18:59 06:59 18:59


 


Intake Total 720  240


 


Output Total 200 700 


 


Balance 520 -700 240


 


Weight  89.8 kg 


 


Intake:   


 


  Oral 720  240


 


Output:   


 


  Urine 200 700 


 


Other:   


 


  Voiding Method  Toilet 


 


  # Voids 2 1 














- Labs


CBC & Chem 7: 


 01/17/19 06:03





 01/17/19 06:03


Labs: 


 Abnormal Lab Results - Last 24 Hours (Table)











  01/16/19 01/16/19 01/16/19 Range/Units





  11:30 16:57 21:05 


 


RBC     (3.80-5.40)  m/uL


 


Hgb     (11.4-16.0)  gm/dL


 


Hct     (34.0-46.0)  %


 


BUN     (7-17)  mg/dL


 


Creatinine     (0.52-1.04)  mg/dL


 


Glucose     (74-99)  mg/dL


 


POC Glucose (mg/dL)  205 H  174 H  208 H  (75-99)  mg/dL














  01/17/19 01/17/19 01/17/19 Range/Units





  05:35 06:03 06:03 


 


RBC   3.24 L   (3.80-5.40)  m/uL


 


Hgb   9.2 L   (11.4-16.0)  gm/dL


 


Hct   29.3 L   (34.0-46.0)  %


 


BUN    37 H  (7-17)  mg/dL


 


Creatinine    1.53 H  (0.52-1.04)  mg/dL


 


Glucose    107 H  (74-99)  mg/dL


 


POC Glucose (mg/dL)  122 H    (75-99)  mg/dL














Assessment and Plan


Plan: 





Assessment:


1.  Acute kidney injury secondary to ATN secondary to contrast-induced 

nephropathy and diuresis.  Renal function stable.  Unclear as to what her 

baseline renal function is.


2.  Proteinuria.  This is likely secondary to underlying diabetic kidney 

disease.


3.  Systolic CHF with ejection fraction of 20-25%.


4.  Volume overload.  Improved.


5.  Anemia.  Iron deficiency noted.


6.  Diabetes mellitus.


7.  Coronary artery disease.  Status post cardiac catheterization on January 14 

which revealed triple-vessel disease.  Scheduled for CABG on Thursday.





Plan:


Hold Lasix for now.


Ferrlecit 125 mg IV daily for 3 days.  Second dose today.


Avoid nephrotoxins.


Avoid metformin if GFR less than 30.


Repeat electrolytes in the morning.

## 2019-01-17 NOTE — P.PN
Subjective


Progress Note Date: 01/17/19


This is a 68-year-old  female who was admitted to the hospital with a 

non-ST elevation myocardial infarction, she underwent a cardiac catheterization 

which revealed severe triple-vessel coronary artery disease.  Patient has 

ischemic myopathy with severe LV dysfunction, she had been evaluated by 

cardiothoracic surgery and felt not to be an ideal candidate for bypass 

surgery.  Dr. Elise the on-call interventionist also reviewed angiographic 

data felt that the patient would benefit more from bypass surgery then catheter-

based revascularization.  Dr. Hoskins is scheduled to be at the hospital today, 

he will review the data and give his input and opinion.  Overall the patient 

feels well, she is ambulated in the [facility] today, no shortness of breath, no 

chest discomfort.  Blood pressure 124/56 this morning, heart rate in the 80s to 

90s, 94% on room air.  White blood cell count 6.0, hemoglobin 9.3, platelet 

count 285.  Sodium 142, potassium 4.5, BUN 34, and creatinine 1.5.











01/17/2019


Patient was seen and examined this morning, feels well, up ambulating in the 

hallway without any difficulty.  Earlier today it was noted that the patient's 

heart rate was up in the 120 range, sinus tachycardia.  Dosing  adjustments 

were made to her beta blocker, this afternoon her heart rate is in the 70s.  

Patient stated was reviewed by Dr. Hoskins, his recommendation is for the 

patient to come back next Thursday for coronary artery bypass grafting surgery.

  Blood pressure today 128/90 with a heart rate of 80.  BUN today is 37 

creatinine 1.5, hemoglobin 9.2.











Objective





- Vital Signs


Vital signs: 


 Vital Signs











Temp  96.9 F L  01/17/19 08:00


 


Pulse  138 H  01/17/19 08:00


 


Resp  16   01/17/19 05:15


 


BP  128/90   01/17/19 08:00


 


Pulse Ox  96   01/17/19 08:00








 Intake & Output











 01/16/19 01/17/19 01/17/19





 18:59 06:59 18:59


 


Intake Total 720  240


 


Output Total 200 700 


 


Balance 520 -700 240


 


Weight  89.8 kg 


 


Intake:   


 


  Oral 720  240


 


Output:   


 


  Urine 200 700 


 


Other:   


 


  Voiding Method  Toilet 


 


  # Voids 2 1 2














- Exam





PHYSICAL EXAMINATION: 





GENERAL: 68-year-old  female in no acute distress at the time of my 

examination





HEENT: Head is atraumatic, normocephalic.  Pupils equal, round.  Sclera 

anicteric. Conjunctiva are clear.  Mucous membranes of the mouth are moist.  

Neck is supple.  There is no elevated jugular venous pressure.  No carotid  

bruit is heard.





HEART EXAMINATION: Heart S1, S2 normal.  No murmur or gallop heard.





CHEST EXAMINATION: Lungs are clear to auscultation and precussion. No chest 

wall tenderness is noted on palpation or with deep breathing.





ABDOMEN:  Soft, nontender. Bowel sounds are heard. No organomegaly noted.


 


EXTREMITIES: 2+ peripheral pulses with no evidence of peripheral edema and no 

calf tenderness noted.





NEUROLOGIC patient is awake, alert and oriented 3.


 


.


 








- Labs


CBC & Chem 7: 


 01/17/19 06:03





 01/17/19 06:03


Labs: 


 Abnormal Lab Results - Last 24 Hours (Table)











  01/16/19 01/16/19 01/17/19 Range/Units





  16:57 21:05 05:35 


 


RBC     (3.80-5.40)  m/uL


 


Hgb     (11.4-16.0)  gm/dL


 


Hct     (34.0-46.0)  %


 


BUN     (7-17)  mg/dL


 


Creatinine     (0.52-1.04)  mg/dL


 


Glucose     (74-99)  mg/dL


 


POC Glucose (mg/dL)  174 H  208 H  122 H  (75-99)  mg/dL


 


Crossmatch     














  01/17/19 01/17/19 01/17/19 Range/Units





  06:03 06:03 08:25 


 


RBC  3.24 L    (3.80-5.40)  m/uL


 


Hgb  9.2 L    (11.4-16.0)  gm/dL


 


Hct  29.3 L    (34.0-46.0)  %


 


BUN   37 H   (7-17)  mg/dL


 


Creatinine   1.53 H   (0.52-1.04)  mg/dL


 


Glucose   107 H   (74-99)  mg/dL


 


POC Glucose (mg/dL)     (75-99)  mg/dL


 


Crossmatch    See Detail  














Assessment and Plan


Plan: 





Assessment and plan


#1 non-ST elevation myocardial infarction, status post cardiac catheterization 

which revealed severe triple-vessel coronary artery disease


#2 ischemic cardiomyopathy with severe left ventricular dysfunction


#3 hyperlipidemia


#4 diabetes








Plan


Patient may be able to be discharged home today from cardiology standpoint.  

She will return next Thursday for coronary artery bypass grafting surgery with 

Dr. Hoskins.  Her dose of beta blocker today was increased from 50 mg twice a 

day to 50 mg 3 times a day.  We will continue the rest of her medications 

including an aspirin, Lipitor 40, lisinopril 5 mg daily, and Aldactone 12-1/2 

mg daily along with sublingual nitroglycerin.


DNP note has been reviewed, I agree with a documented findings and plan of 

care.  Patient was seen and examined.








DNP note has been reviewed, I agree with a documented findings and plan of 

care.  Patient was seen and examined.

## 2019-01-21 NOTE — DS
DISCHARGE SUMMARY



DATE OF ADMISSION:

01/13/2019



DATE OF DISCHARGE:

01/17/2019.



FINAL DIAGNOSES:

1. Acute congestive heart failure exacerbation from systolic dysfunction, EF 20 to 25%

    from underlying coronary artery disease.

2. Acute non-Q-wave myocardial infarction, POA.

3. Severe triple-vessel coronary artery disease per cardiac catheterization.

4. Peripheral artery disease.

5. Chronic urine incontinence.

6. Diabetes mellitus Type 2 on oral hypoglycemic.

7. Chronic kidney stage 3 probably from combination diabetic nephropathy and

    nephrosclerosis.

8. Metabolic acidosis from renal failure.

9. Acute renal failure could be contrast induced nephropathy.



HOSPITAL COURSE:

This patient with acute CHF exacerbation and acute non-Q-wave MI.  Cardiac

catheterization did reveal triple-vessel disease.  Finally, it was determined by Dr. Hoskins and the family that the patient will be going for a coronary bypass next week.

The patient did have a renal ultrasound that showed asymptomatic cholelithiasis.  2D

echocardiogram showed EF of 20-25 percent with multiple wall motion abnormalities.  The

patient doing better by the time of discharge.



PHYSICAL EXAMINATION:

Temperature 96.9, pulse 81, respirations 16, blood pressure 117/55, pulse ox 93 percent

on room air.  Lungs fair entry.  CARDIOVASCULAR: First and second sounds normal.



LABS:

Hemoglobin 9.2, creatinine is 1.53.



CONSULTATIONS:

Dr. ARNOLDO Tomas from Cardiology, Dr. Nielsen/Dr. Hoskins from Cardiothoracic surgery, Dr. Salazar from Nephrology.



DISCHARGE MEDICATIONS:

1. Aspirin 325 mg a day.

2. Lisinopril 5 mg a day.

3. Metformin 1000 mg b.i.d.

4. Amaryl 2 mg p.o. daily.

5. Oxybutynin 10 mg p.o. daily.

6. Lipitor 40 mg p.o. daily.

7. Lopressor 50 mg p.o. b.i.d.

8. Nitrostat 0.4 sublingual q.5 p.r.n.

9. Aldactone 12.5 p.o. daily.



FOLLOWUP:

Follow up with Dr. Elise in 1 week, Dr. Stephens in 1 week, Dr. Salazar in 1 week, Dr. PANKAJ Tomas in one week.  The patient is scheduled for surgery next Thursday.





MMODL / IJN: [id number] / Job#: [id number]

## 2019-01-24 ENCOUNTER — HOSPITAL ENCOUNTER (INPATIENT)
Dept: HOSPITAL 47 - 2ORMAIN | Age: 69
LOS: 7 days | Discharge: NURSING, REHAB OR LONG-TERM CARE | DRG: 235 | End: 2019-01-31
Attending: THORACIC SURGERY (CARDIOTHORACIC VASCULAR SURGERY) | Admitting: THORACIC SURGERY (CARDIOTHORACIC VASCULAR SURGERY)
Payer: MEDICARE

## 2019-01-24 VITALS — BODY MASS INDEX: 34.4 KG/M2

## 2019-01-24 DIAGNOSIS — Z95.5: ICD-10-CM

## 2019-01-24 DIAGNOSIS — D62: ICD-10-CM

## 2019-01-24 DIAGNOSIS — E11.22: ICD-10-CM

## 2019-01-24 DIAGNOSIS — E66.01: ICD-10-CM

## 2019-01-24 DIAGNOSIS — I34.0: ICD-10-CM

## 2019-01-24 DIAGNOSIS — Z80.3: ICD-10-CM

## 2019-01-24 DIAGNOSIS — I25.5: ICD-10-CM

## 2019-01-24 DIAGNOSIS — E78.5: ICD-10-CM

## 2019-01-24 DIAGNOSIS — I25.10: Primary | ICD-10-CM

## 2019-01-24 DIAGNOSIS — R32: ICD-10-CM

## 2019-01-24 DIAGNOSIS — I50.22: ICD-10-CM

## 2019-01-24 DIAGNOSIS — E11.51: ICD-10-CM

## 2019-01-24 DIAGNOSIS — I95.9: ICD-10-CM

## 2019-01-24 DIAGNOSIS — E87.2: ICD-10-CM

## 2019-01-24 DIAGNOSIS — Z79.899: ICD-10-CM

## 2019-01-24 DIAGNOSIS — N18.3: ICD-10-CM

## 2019-01-24 DIAGNOSIS — Z82.49: ICD-10-CM

## 2019-01-24 DIAGNOSIS — I25.2: ICD-10-CM

## 2019-01-24 DIAGNOSIS — Z88.1: ICD-10-CM

## 2019-01-24 DIAGNOSIS — J44.9: ICD-10-CM

## 2019-01-24 DIAGNOSIS — Z79.84: ICD-10-CM

## 2019-01-24 DIAGNOSIS — R00.1: ICD-10-CM

## 2019-01-24 DIAGNOSIS — Z80.0: ICD-10-CM

## 2019-01-24 DIAGNOSIS — F17.201: ICD-10-CM

## 2019-01-24 DIAGNOSIS — E87.5: ICD-10-CM

## 2019-01-24 DIAGNOSIS — J98.11: ICD-10-CM

## 2019-01-24 DIAGNOSIS — R11.0: ICD-10-CM

## 2019-01-24 DIAGNOSIS — I83.90: ICD-10-CM

## 2019-01-24 DIAGNOSIS — E87.1: ICD-10-CM

## 2019-01-24 DIAGNOSIS — Z79.82: ICD-10-CM

## 2019-01-24 DIAGNOSIS — N17.0: ICD-10-CM

## 2019-01-24 DIAGNOSIS — J90: ICD-10-CM

## 2019-01-24 DIAGNOSIS — E88.09: ICD-10-CM

## 2019-01-24 DIAGNOSIS — I13.0: ICD-10-CM

## 2019-01-24 LAB
ALBUMIN SERPL-MCNC: 2.3 G/DL (ref 3.5–5)
ALBUMIN SERPL-MCNC: 2.7 G/DL (ref 3.5–5)
ALP SERPL-CCNC: 63 U/L (ref 38–126)
ALP SERPL-CCNC: 69 U/L (ref 38–126)
ALT SERPL-CCNC: 21 U/L (ref 9–52)
ALT SERPL-CCNC: 27 U/L (ref 9–52)
ANION GAP SERPL CALC-SCNC: 5 MMOL/L
ANION GAP SERPL CALC-SCNC: 6 MMOL/L
APTT BLD: 22.9 SEC (ref 22–30)
APTT BLD: 24.5 SEC (ref 22–30)
AST SERPL-CCNC: 13 U/L (ref 14–36)
AST SERPL-CCNC: 14 U/L (ref 14–36)
BASOPHILS # BLD AUTO: 0 K/UL (ref 0–0.2)
BASOPHILS NFR BLD AUTO: 0 %
BUN SERPL-SCNC: 28 MG/DL (ref 7–17)
BUN SERPL-SCNC: 30 MG/DL (ref 7–17)
CALCIUM SPEC-MCNC: 7.5 MG/DL (ref 8.4–10.2)
CALCIUM SPEC-MCNC: 8 MG/DL (ref 8.4–10.2)
CHLORIDE SERPL-SCNC: 112 MMOL/L (ref 98–107)
CHLORIDE SERPL-SCNC: 115 MMOL/L (ref 98–107)
CO2 BLDA-SCNC: 21 MMOL/L (ref 19–24)
CO2 BLDA-SCNC: 22 MMOL/L (ref 19–24)
CO2 BLDA-SCNC: 23 MMOL/L (ref 19–24)
CO2 BLDA-SCNC: 24 MMOL/L (ref 19–24)
CO2 BLDA-SCNC: 24 MMOL/L (ref 19–24)
CO2 BLDA-SCNC: 25 MMOL/L (ref 19–24)
CO2 SERPL-SCNC: 21 MMOL/L (ref 22–30)
CO2 SERPL-SCNC: 23 MMOL/L (ref 22–30)
EOSINOPHIL # BLD AUTO: 0 K/UL (ref 0–0.7)
EOSINOPHIL # BLD AUTO: 0 K/UL (ref 0–0.7)
EOSINOPHIL # BLD AUTO: 0.1 K/UL (ref 0–0.7)
EOSINOPHIL NFR BLD AUTO: 0 %
EOSINOPHIL NFR BLD AUTO: 0 %
EOSINOPHIL NFR BLD AUTO: 1 %
ERYTHROCYTE [DISTWIDTH] IN BLOOD BY AUTOMATED COUNT: 2.63 M/UL (ref 3.8–5.4)
ERYTHROCYTE [DISTWIDTH] IN BLOOD BY AUTOMATED COUNT: 2.83 M/UL (ref 3.8–5.4)
ERYTHROCYTE [DISTWIDTH] IN BLOOD BY AUTOMATED COUNT: 2.88 M/UL (ref 3.8–5.4)
ERYTHROCYTE [DISTWIDTH] IN BLOOD: 14.9 % (ref 11.5–15.5)
ERYTHROCYTE [DISTWIDTH] IN BLOOD: 15.1 % (ref 11.5–15.5)
ERYTHROCYTE [DISTWIDTH] IN BLOOD: 15.4 % (ref 11.5–15.5)
GLUCOSE BLD-MCNC: 123 MG/DL (ref 75–99)
GLUCOSE BLD-MCNC: 141 MG/DL (ref 75–99)
GLUCOSE BLD-MCNC: 147 MG/DL (ref 75–99)
GLUCOSE BLD-MCNC: 156 MG/DL (ref 75–99)
GLUCOSE BLD-MCNC: 162 MG/DL (ref 75–99)
GLUCOSE BLD-MCNC: 166 MG/DL (ref 75–99)
GLUCOSE BLD-MCNC: 179 MG/DL (ref 75–99)
GLUCOSE BLD-MCNC: 179 MG/DL (ref 75–99)
GLUCOSE SERPL-MCNC: 144 MG/DL (ref 74–99)
GLUCOSE SERPL-MCNC: 150 MG/DL (ref 74–99)
HCO3 BLDA-SCNC: 20 MMOL/L (ref 21–25)
HCO3 BLDA-SCNC: 21 MMOL/L (ref 21–25)
HCO3 BLDA-SCNC: 22 MMOL/L (ref 21–25)
HCO3 BLDA-SCNC: 23 MMOL/L (ref 21–25)
HCO3 BLDA-SCNC: 24 MMOL/L (ref 21–25)
HCO3 BLDA-SCNC: 24 MMOL/L (ref 21–25)
HCT VFR BLD AUTO: 23.5 % (ref 34–46)
HCT VFR BLD AUTO: 25.1 % (ref 34–46)
HCT VFR BLD AUTO: 25.4 % (ref 34–46)
HGB BLD-MCNC: 7.7 GM/DL (ref 11.4–16)
HGB BLD-MCNC: 8.1 GM/DL (ref 11.4–16)
HGB BLD-MCNC: 8.2 GM/DL (ref 11.4–16)
INR PPP: 1.1 (ref ?–1.2)
INR PPP: 1.2 (ref ?–1.2)
LYMPHOCYTES # SPEC AUTO: 0.6 K/UL (ref 1–4.8)
LYMPHOCYTES # SPEC AUTO: 0.9 K/UL (ref 1–4.8)
LYMPHOCYTES # SPEC AUTO: 2.6 K/UL (ref 1–4.8)
LYMPHOCYTES NFR SPEC AUTO: 20 %
LYMPHOCYTES NFR SPEC AUTO: 5 %
LYMPHOCYTES NFR SPEC AUTO: 8 %
MAGNESIUM SPEC-SCNC: 1.9 MG/DL (ref 1.6–2.3)
MAGNESIUM SPEC-SCNC: 2.2 MG/DL (ref 1.6–2.3)
MCH RBC QN AUTO: 28.2 PG (ref 25–35)
MCH RBC QN AUTO: 29.1 PG (ref 25–35)
MCH RBC QN AUTO: 29.3 PG (ref 25–35)
MCHC RBC AUTO-ENTMCNC: 32 G/DL (ref 31–37)
MCHC RBC AUTO-ENTMCNC: 32.7 G/DL (ref 31–37)
MCHC RBC AUTO-ENTMCNC: 32.9 G/DL (ref 31–37)
MCV RBC AUTO: 88.2 FL (ref 80–100)
MCV RBC AUTO: 88.5 FL (ref 80–100)
MCV RBC AUTO: 89.6 FL (ref 80–100)
MONOCYTES # BLD AUTO: 0.7 K/UL (ref 0–1)
MONOCYTES # BLD AUTO: 0.7 K/UL (ref 0–1)
MONOCYTES # BLD AUTO: 0.8 K/UL (ref 0–1)
MONOCYTES NFR BLD AUTO: 6 %
MONOCYTES NFR BLD AUTO: 6 %
MONOCYTES NFR BLD AUTO: 7 %
NEUTROPHILS # BLD AUTO: 8.9 K/UL (ref 1.3–7.7)
NEUTROPHILS # BLD AUTO: 9.4 K/UL (ref 1.3–7.7)
NEUTROPHILS # BLD AUTO: 9.8 K/UL (ref 1.3–7.7)
NEUTROPHILS NFR BLD AUTO: 70 %
NEUTROPHILS NFR BLD AUTO: 83 %
NEUTROPHILS NFR BLD AUTO: 85 %
PCO2 BLDA: 34 MMHG (ref 35–45)
PCO2 BLDA: 37 MMHG (ref 35–45)
PCO2 BLDA: 39 MMHG (ref 35–45)
PCO2 BLDA: 39 MMHG (ref 35–45)
PCO2 BLDA: 40 MMHG (ref 35–45)
PCO2 BLDA: 40 MMHG (ref 35–45)
PCO2 BLDA: 41 MMHG (ref 35–45)
PCO2 BLDA: 45 MMHG (ref 35–45)
PH BLDA: 7.32 [PH] (ref 7.35–7.45)
PH BLDA: 7.32 [PH] (ref 7.35–7.45)
PH BLDA: 7.33 [PH] (ref 7.35–7.45)
PH BLDA: 7.37 [PH] (ref 7.35–7.45)
PH BLDA: 7.38 [PH] (ref 7.35–7.45)
PH BLDA: 7.43 [PH] (ref 7.35–7.45)
PLATELET # BLD AUTO: 231 K/UL (ref 150–450)
PLATELET # BLD AUTO: 242 K/UL (ref 150–450)
PLATELET # BLD AUTO: 259 K/UL (ref 150–450)
PO2 BLDA: 160 MMHG (ref 83–108)
PO2 BLDA: 173 MMHG (ref 83–108)
PO2 BLDA: 226 MMHG (ref 83–108)
PO2 BLDA: 231 MMHG (ref 83–108)
PO2 BLDA: 300 MMHG (ref 83–108)
PO2 BLDA: 66 MMHG (ref 83–108)
PO2 BLDA: 71 MMHG (ref 83–108)
PO2 BLDA: >420 MMHG (ref 83–108)
POTASSIUM BLDA-SCNC: 3.6 MMOL/L (ref 3.4–4.5)
POTASSIUM BLDA-SCNC: 3.7 MMOL/L (ref 3.4–4.5)
POTASSIUM BLDA-SCNC: 3.9 MMOL/L (ref 3.4–4.5)
POTASSIUM BLDA-SCNC: 4 MMOL/L (ref 3.4–4.5)
POTASSIUM BLDA-SCNC: 4 MMOL/L (ref 3.4–4.5)
POTASSIUM BLDA-SCNC: 4.1 MMOL/L (ref 3.4–4.5)
POTASSIUM SERPL-SCNC: 3.9 MMOL/L (ref 3.5–5.1)
POTASSIUM SERPL-SCNC: 4.1 MMOL/L (ref 3.5–5.1)
PROT SERPL-MCNC: 4.7 G/DL (ref 6.3–8.2)
PROT SERPL-MCNC: 5.2 G/DL (ref 6.3–8.2)
PT BLD: 11.3 SEC (ref 9–12)
PT BLD: 12.1 SEC (ref 9–12)
SODIUM BLDA-SCNC: 140 MMOL/L (ref 135–146)
SODIUM BLDA-SCNC: 140 MMOL/L (ref 135–146)
SODIUM BLDA-SCNC: 141 MMOL/L (ref 135–146)
SODIUM BLDA-SCNC: 142 MMOL/L (ref 135–146)
SODIUM SERPL-SCNC: 140 MMOL/L (ref 137–145)
SODIUM SERPL-SCNC: 142 MMOL/L (ref 137–145)
WBC # BLD AUTO: 11.1 K/UL (ref 3.8–10.6)
WBC # BLD AUTO: 11.7 K/UL (ref 3.8–10.6)
WBC # BLD AUTO: 12.6 K/UL (ref 3.8–10.6)

## 2019-01-24 PROCEDURE — 85610 PROTHROMBIN TIME: CPT

## 2019-01-24 PROCEDURE — 83735 ASSAY OF MAGNESIUM: CPT

## 2019-01-24 PROCEDURE — 36430 TRANSFUSION BLD/BLD COMPNT: CPT

## 2019-01-24 PROCEDURE — 85025 COMPLETE CBC W/AUTO DIFF WBC: CPT

## 2019-01-24 PROCEDURE — 82805 BLOOD GASES W/O2 SATURATION: CPT

## 2019-01-24 PROCEDURE — 02110AW BYPASS CORONARY ARTERY, TWO ARTERIES FROM AORTA WITH AUTOLOGOUS ARTERIAL TISSUE, OPEN APPROACH: ICD-10-PCS

## 2019-01-24 PROCEDURE — 85520 HEPARIN ASSAY: CPT

## 2019-01-24 PROCEDURE — 80053 COMPREHEN METABOLIC PANEL: CPT

## 2019-01-24 PROCEDURE — 82330 ASSAY OF CALCIUM: CPT

## 2019-01-24 PROCEDURE — 86920 COMPATIBILITY TEST SPIN: CPT

## 2019-01-24 PROCEDURE — 86891 AUTOLOGOUS BLOOD OP SALVAGE: CPT

## 2019-01-24 PROCEDURE — 03BB4ZZ EXCISION OF RIGHT RADIAL ARTERY, PERCUTANEOUS ENDOSCOPIC APPROACH: ICD-10-PCS

## 2019-01-24 PROCEDURE — 71045 X-RAY EXAM CHEST 1 VIEW: CPT

## 2019-01-24 PROCEDURE — 85027 COMPLETE CBC AUTOMATED: CPT

## 2019-01-24 PROCEDURE — 85730 THROMBOPLASTIN TIME PARTIAL: CPT

## 2019-01-24 PROCEDURE — 94002 VENT MGMT INPAT INIT DAY: CPT

## 2019-01-24 PROCEDURE — 86850 RBC ANTIBODY SCREEN: CPT

## 2019-01-24 PROCEDURE — 02100Z9 BYPASS CORONARY ARTERY, ONE ARTERY FROM LEFT INTERNAL MAMMARY, OPEN APPROACH: ICD-10-PCS

## 2019-01-24 PROCEDURE — 71046 X-RAY EXAM CHEST 2 VIEWS: CPT

## 2019-01-24 PROCEDURE — 86900 BLOOD TYPING SEROLOGIC ABO: CPT

## 2019-01-24 PROCEDURE — 86901 BLOOD TYPING SEROLOGIC RH(D): CPT

## 2019-01-24 PROCEDURE — 80048 BASIC METABOLIC PNL TOTAL CA: CPT

## 2019-01-24 PROCEDURE — 94640 AIRWAY INHALATION TREATMENT: CPT

## 2019-01-24 PROCEDURE — 84100 ASSAY OF PHOSPHORUS: CPT

## 2019-01-24 RX ADMIN — IPRATROPIUM BROMIDE AND ALBUTEROL SULFATE SCH ML: .5; 3 SOLUTION RESPIRATORY (INHALATION) at 20:14

## 2019-01-24 RX ADMIN — DILTIAZEM HYDROCHLORIDE SCH MLS/HR: 5 INJECTION INTRAVENOUS at 15:48

## 2019-01-24 RX ADMIN — IPRATROPIUM BROMIDE AND ALBUTEROL SULFATE SCH ML: .5; 3 SOLUTION RESPIRATORY (INHALATION) at 16:58

## 2019-01-24 RX ADMIN — POTASSIUM CHLORIDE SCH MLS/HR: 14.9 INJECTION, SOLUTION INTRAVENOUS at 15:30

## 2019-01-24 RX ADMIN — IPRATROPIUM BROMIDE AND ALBUTEROL SULFATE SCH: .5; 3 SOLUTION RESPIRATORY (INHALATION) at 20:14

## 2019-01-24 RX ADMIN — MAGNESIUM SULFATE IN DEXTROSE SCH MLS/HR: 10 INJECTION, SOLUTION INTRAVENOUS at 18:29

## 2019-01-24 RX ADMIN — ACETAMINOPHEN SCH MLS/HR: 10 INJECTION, SOLUTION INTRAVENOUS at 16:23

## 2019-01-24 RX ADMIN — MAGNESIUM SULFATE IN DEXTROSE SCH: 10 INJECTION, SOLUTION INTRAVENOUS at 21:44

## 2019-01-24 RX ADMIN — CEFAZOLIN SCH GM: 10 INJECTION, POWDER, FOR SOLUTION INTRAVENOUS at 16:24

## 2019-01-24 RX ADMIN — SODIUM CHLORIDE, PRESERVATIVE FREE SCH ML: 5 INJECTION INTRAVENOUS at 21:53

## 2019-01-24 RX ADMIN — FENTANYL CITRATE PRN MCG: 50 INJECTION, SOLUTION INTRAMUSCULAR; INTRAVENOUS at 20:50

## 2019-01-24 RX ADMIN — POTASSIUM CHLORIDE SCH: 7.46 INJECTION, SOLUTION INTRAVENOUS at 21:45

## 2019-01-24 RX ADMIN — POTASSIUM CHLORIDE SCH MLS/HR: 7.46 INJECTION, SOLUTION INTRAVENOUS at 18:12

## 2019-01-24 RX ADMIN — INSULIN HUMAN SCH MLS/HR: 100 INJECTION, SOLUTION PARENTERAL at 16:13

## 2019-01-24 RX ADMIN — MUPIROCIN SCH APPLIC: 20 OINTMENT TOPICAL at 22:54

## 2019-01-24 RX ADMIN — FENTANYL CITRATE PRN MCG: 50 INJECTION, SOLUTION INTRAMUSCULAR; INTRAVENOUS at 16:45

## 2019-01-24 NOTE — P.CNPUL
History of Present Illness


Consult date: 19


Requesting physician: Igor Hoskins


Reason for consult: other


Chief complaint: Coronary artery disease, status post bypass grafting


History of present illness: 


 This is a 68-year-old white female patient of Dr. Stephens, with past medical 

history of myocardial infarction in 2006 with stent placement, hyperlipidemia, 

peripheral artery disease with stent placement to the left lower extremity in 

2016, recent vein surgery to bilateral lower extremities, diabetes mellitus 

type 2, obesity, previous tobacco dependence, and family history of early 

coronary artery disease.  Patient came into the emergency department on 2019 with complaints of increasing shortness of breath, exacerbated with 

exertion, palpitations, lower extremity edema, nausea.  Patient did not have 

any chest pain or pressure.  EKG showed nonspecific ST/T-wave changes, her 

troponins were elevated and peaked at 0.606,  ruled out then for acute non-

ST elevated MI.  ProBNP was elevated at 3570.  Chest x-ray showed pulmonary 

vascular congestion and bilateral pleural effusion.  Patient had a heart 

catheterization by Dr. Thayer which showed stent to the RCA stenosis of 80-90%, 

distal RCA stenosis 70%, subtotal occlusion of the mid LAD with extensive 

disease in the proximal LAD and an OM branch of the circumflex artery with 60-70

% stenosis.  Echocardiogram showed mild concentric left ventricular hypertrophy

, severe global hypokinesis of the left ventricle, severely impaired left 

ventricle systolic function with an EF between 20-25%.  Basal inferior LV wall 

and inferoseptal LV wall hypokinesis.  Apical anterior, inferior, septal and 

lateral apical LV wall hypokinesis.  Aortic valve was trileaflet and mildly 

thickened, mild MR, and trace TR.  Patient was also found to have acute kidney 

injury and underwent evaluation by nephrology who felt that her kidney injury 

was related to cardiorenal syndrome.  She was evaluated by cardiothoracic 

surgery and today on 2019 she underwent off-pump three-vessel coronary 

artery bypass grafting with LIMA to LAD, right radial artery graft to up to his 

marginal, and left radial artery graft to posterior descending.  Patient is 

seen in the postoperative period in the intensive care unit, she is intubated, 

on mechanical ventilator.  Current vent settings are assist-control mode with a 

with a rate of 12, tidal vital 600, FiO2 100%, and PEEP of 10.  Postop blood 

gases were reviewed by Dr. Vivas, and showed pO2 of 300, pCO2 40, and pH of 

7.32, this was done on FiO2 100%, and FiO2 was dropped down to 50%.  Postop 

chest x-ray was reviewed by Dr. Vivas and showed postoperative changes with 

bilateral infiltrates, small effusion, cutaneous emphysema and mild central 

venous congestion.  She is in sinus rhythm, with a rate of 67 BPM, maintenance 

IV fluids his lactated Ringer's at a rate of 50 ML per hour, dopamine for 

bradycardia at 3 mics per kilo per minute, diltiazem at 5 mg per hour, nitro 

drip at 5 units per kilo per minute, insulin drip is at 2 units per hour, 

Levaquin fed at 2 mics per minutes.  Mediastianal incision is clean dry and 

intact, covered with a surgical dressing, 2 mediastinal chest tubes and a right 

left pleural chest tubes with small amount of sanguinous output in the Pleur-

evac, to wall suction.  No epicardial wires.  1 LANE drains in the right and left 

wrists with small amount of sanguinous drainage, both wrists are Ace wrapped, 

SCDs on lower extremities.  Lerma catheter is in place, patient is nonoliguric, 

1 unit of blood was transfused, postop blood work is pending. 





Review of Systems


All systems: negative


Constitutional: Denies chills, Denies fever


Eyes: denies blurred vision, denies pain


Ears, nose, mouth and throat: Denies headache, Denies sore throat


Cardiovascular: Reports decreased exercise tolerance, Reports dyspnea on 

exertion, Reports edema, Reports leg edema, Reports orthopnea, Reports 

palpitations, Denies chest pain, Denies shortness of breath


Respiratory: Denies cough


Gastrointestinal: Denies abdominal pain, Denies diarrhea, Denies nausea, Denies 

vomiting


Genitourinary: Denies dysuria, Denies hematuria


Musculoskeletal: Denies myalgias


Integumentary: Denies pruritus, Denies rash


Neurological: Denies numbness, Denies weakness


Psychiatric: Denies anxiety, Denies depression


Endocrine: Denies fatigue, Denies weight change





Past Medical History


Past Medical History: Coronary Artery Disease (CAD), Chest Pain / Angina, 

Diabetes Mellitus, Hyperlipidemia, Myocardial Infarction (MI)


Additional Past Medical History / Comment(s): varicose veins,MI x2


Last Myocardial Infarction Date:: 2006,2019


History of Any Multi-Drug Resistant Organisms: None Reported


Past Surgical History:  Section, Heart Catheterization, Heart 

Catheterization With Stent


Additional Past Surgical History / Comment(s): vein work-last procedure Dec 2018

, stent above knee left leg,heart stents x3


Past Anesthesia/Blood Transfusion Reactions: No Reported Reaction


Additional Past Anesthesia/Blood Transfusion Reaction / Comment(s): no hx blood 

transfusion


Date of Last Stent Placement:: 2006


Past Psychological History: No Psychological Hx Reported


Smoking Status: Former smoker


Past Alcohol Use History: None Reported


Additional Past Alcohol Use History / Comment(s): quit smoking ,smoked 

approx 40 yrs 1ppd


Past Drug Use History: None Reported





- Past Family History


  ** Mother


Family Medical History: Coronary Artery Disease (CAD)


Additional Family Medical History / Comment(s): mother passed away during CABG





  ** Father


Family Medical History: Cancer


Additional Family Medical History / Comment(s): colon CA





  ** Brother(s)


Family Medical History: Cancer


Additional Family Medical History / Comment(s): colon CA





  ** Sister(s)


Family Medical History: Cancer


Additional Family Medical History / Comment(s): breast CA





Medications and Allergies


 Home Medications











 Medication  Instructions  Recorded  Confirmed  Type


 


Aspirin EC [Ecotrin] 325 mg PO DAILY 18 History


 


Lisinopril [Zestril] 5 mg PO DAILY 18 History


 


metFORMIN HCL 1,000 mg PO BID 18 History


 


Glimepiride [Amaryl] 2 mg PO DAILY 19 History


 


Oxybutynin Chloride [Oxybutynin 10 mg PO DAILY 19 History





Chloride ER]    


 


Atorvastatin [Lipitor] 40 mg PO DAILY #30 tab 19 Rx


 


Metoprolol Tartrate [Lopressor] 50 mg PO BID #60 tab 19 Rx


 


Nitroglycerin Sl Tabs [Nitrostat] 0.4 mg SUBLINGUAL Q5M PRN #25 tab 19 Rx


 


Spironolactone [Aldactone] 12.5 mg PO DAILY #30 tab 19 Rx











 Allergies











Allergy/AdvReac Type Severity Reaction Status Date / Time


 


tetracycline Allergy  Rash/Hives Verified 19 14:02














Physical Exam


Vitals: 


 Vital Signs











  Temp Pulse Pulse Resp BP BP Pulse Ox


 


 19 06:30  98.5 F  88  94  16  130/80  131/70  94 L


 


 19 06:23  98.5 F   94  16   131/70  94 L








 Intake and Output











 19





 06:59 14:59 22:59


 


Intake Total  313 


 


Output Total  950 


 


Balance  -637 


 


Intake:   


 


  IV  3 


 


  Blood Product  310 


 


    Rc Cpda-1  Unit  310 





    [id number]   


 


Output:   


 


  Urine  150 


 


  Estimated Blood Loss  800 


 


Other:   


 


  Weight 90.9 kg  











 GENERAL EXAM: Sedated, intubated on mechanical ventilator, 68-year-old obese 

white female, comfortable in no apparent distress.


HEAD: Normocephalic/atraumatic.


EYES: Normal reaction of pupils, equal size.  Conjunctiva pink, sclera white.


NOSE: Clear with pink turbinates.


THROAT: No erythema or exudates.


NECK: No masses, no JVD, no thyroid enlargement, no adenopathy.


CHEST: No chest wall deformity.  Symmetrical expansion.  Midsternal incision is 

clean dry and intact, covered with a surgical dressing, 2 mediastinal chest 

tubes and right the left pleural chest tubes to wall suction, with small amount 

of sanguinous output in the [place], no air leak.


LUNGS: Equal air entry with no crackles, wheeze, rhonchi or dullness.


CVS: Regular rate and rhythm, normal S1 and S2, no gallops, no murmurs, no rubs


ABDOMEN: Soft, nontender.  No hepatosplenomegaly, normal bowel sounds, no 

guarding or rigidity.


EXTREMITIES: No clubbing, no edema, no cyanosis, 2+ pulses and upper and lower 

extremities.  One LANE drain in the right wrist, and one LANE drain in the left 

wrist, bilateral radial artery graft harvest sites are covered with Ace wraps


MUSCULOSKELETAL: Muscle strength and tone normal.


SPINE: No scoliosis or deformity


SKIN: No rashes


CENTRAL NERVOUS SYSTEM: Sedated  No focal deficits, tone is normal in all 4 

extremities.














Results





- Laboratory Findings


ABG











ABG pH  7.32  (7.35-7.45)  L  19  15:56    


 


ABG pCO2  40 mmHg (35-45)   19  15:56    


 


ABG pO2  300 mmHg ()  H  19  15:56    


 


ABG O2 Saturation  100.0 % (94-97)  H  19  15:56    








Abnormal lab findings: 


 Abnormal Labs











  19





  08:25 06:36 08:57


 


ABG pH   


 


ABG pCO2    34 L


 


ABG pO2    >420 H


 


ABG Total CO2   


 


ABG O2 Saturation    100.0 H


 


ABG Hematocrit    24 L


 


ABG Ionized Calcium   


 


ABG Glucose    145 H


 


Hemoglobin    7.7 L


 


POC Glucose (mg/dL)   141 H 


 


Arterial Blood Glucose    145 H


 


Crossmatch  See Detail  














  19





  08:57 12:42 13:09


 


ABG pH   


 


ABG pCO2   


 


ABG pO2  160 H  173 H  226 H


 


ABG Total CO2   25 H 


 


ABG O2 Saturation  99.7 H  99.6 H  100.0 H


 


ABG Hematocrit  23 L  23 L  21 L


 


ABG Ionized Calcium   4.4 L  4.3 L


 


ABG Glucose  143 H  166 H  141 H


 


Hemoglobin  7.5 L  7.4 L  6.7 L*


 


POC Glucose (mg/dL)   


 


Arterial Blood Glucose  143 H  166 H  141 H


 


Crossmatch   














  19





  13:45 14:13 15:30


 


ABG pH  7.33 L  


 


ABG pCO2   


 


ABG pO2  66 L  231 H 


 


ABG Total CO2  25 H  25 H 


 


ABG O2 Saturation  92.3 L  100.0 H 


 


ABG Hematocrit  25 L  24 L 


 


ABG Ionized Calcium  4.3 L  4.2 L 


 


ABG Glucose  153 H  157 H 


 


Hemoglobin  8.0 L  7.7 L 


 


POC Glucose (mg/dL)    156 H


 


Arterial Blood Glucose  153 H  157 H 


 


Crossmatch   














  19





  15:56 16:08


 


ABG pH  7.32 L 


 


ABG pCO2  


 


ABG pO2  300 H 


 


ABG Total CO2  


 


ABG O2 Saturation  100.0 H 


 


ABG Hematocrit  


 


ABG Ionized Calcium  


 


ABG Glucose  


 


Hemoglobin  


 


POC Glucose (mg/dL)   166 H


 


Arterial Blood Glucose  


 


Crossmatch  














- Diagnostic Findings


Chest x-ray: report reviewed, image reviewed





Assessment and Plan


Plan: 


 Assessment:





#1.  Coronary artery disease, status post off-pump three-vessel coronary artery 

bypass grafting, with LIMA to LAD, right radial artery graft to the obtuse 

marginal, and left radial artery graft to posterior descending, postop day 0





#2.  Routine postoperative ventilator management





#3.  Recent acute non-ST elevated myocardial infarction





#4.  Diabetes mellitus type 2





#5.  Ischemic cardiomyopathy, with an EF of 20-25%





#6.  Hypertention





#7.  Previous history of nicotine dependence, in remission





#8.  Morbid obesity





Plan:





We will proceed with spontaneous breathing trials and extubation once the 

patient has awake and alert and following commands.  Chest x-ray has been 

reviewed with Dr. Vivas, blood gas has been reviewed with Dr. Vivas, 

necessary ventilator adjustments have been made.  Incentive spirometry to the 

bedside, deep breathing and coughing after extubation, daily chest x-rays and 

labs, GI prophylaxis per surgery.  Hemoynamically patient is stable. 





I performed a history & physical examination of the patient and discussed their 

management with my nurse practitioner, Lu Gambino.  I reviewed the nurse 

practitioner's note and agree with the documented findings and plan of care.  

Lung sounds are positive for clear breath sounds, diminished at the bases.  The 

findings and the impression was discussed with the patient.  I attest to the 

documentation by the nurse practitioner.


Time with Patient: Greater than 30 2.02

## 2019-01-24 NOTE — XR
EXAMINATION TYPE: XR chest 1V portable

 

DATE OF EXAM: 1/24/2019

 

COMPARISON: 1/13/2019

 

HISTORY: Post cardiac surgery

 

TECHNIQUE: Single frontal view of the chest is obtained.

 

FINDINGS:  ET tube 5 cm above yousuf. NG tube seen overlying the left upper quadrant likely within th
e stomach. [place]-Fish catheter seen with the tip overlying proximal pulmonary outflow tract. Bilateral
 chest tubes and mediastinal drain noted. No sizable pneumothorax. Subcutaneous emphysema noted. Ther
e is bilateral infiltrate and interstitial pattern. Small bilateral effusions noted. Heart size stabl
e. Postsurgical changes noted.

 

IMPRESSION:  

1. Postoperative change with bilateral infiltrate, small effusion. Subcutaneous emphysema noted. Rebecca
elate for mild central venous congestion.

## 2019-01-24 NOTE — P.OP
Date of Procedure: 01/24/19


Preoperative Diagnosis: 


Coronary artery disease, chronic systolic and diastolic heart failure


Postoperative Diagnosis: 


Same


Procedure(s) Performed: 


Off-pump CABG 3 with left internal mammary artery graft to LAD, right radial 

artery graft to obtuse marginal and left radial artery graft to posterior 

descending, bilateral endovascular radial artery harvest


Anesthesia: SHANTAL


Surgeon: Igor Hoskins


Assistant #1: Navin Cason


Assistant #2: Frandy Francois


Estimated Blood Loss (ml): 900


IV fluids (ml): 1,500


Urine output (ml): 200


Pathology: none sent


Condition: stable


Disposition: ICU


Indications for Procedure: 


68-year-old female presented with flash pulmonary edema.  She was found to have 

decreased left ventricular ejection fraction.  She had LVH and evidence of 

diastolic dysfunction.  Cardiac catheterization demonstrated severe three-

vessel coronary artery disease.  She was treated for heart failure and 

discharged home with instructions to follow up the following week for bypass 

surgery.  She had history of bilateral vein stripping recently.  Radial artery 

mapping was performed and both radial arteries were appropriate conduits.


Operative Findings: 


The pericardium was markedly thickened and inflamed.  The epicardium was also 

thickened and inflamed.  The heart was densely encased in fat and there were no 

visible vessels on the epicardial surface of the heart.  There was a 500 mL 

right pleural effusion present.  The heart was markedly enlarged.  Radial 

arteries were good caliber conduits but had moderate diffuse calcification 

present.  Left internal mammary artery was an excellent conduit.  Coronary 

targets were diffusely diseased and calcific.  Dissecting them out was quite 

difficult due to all of them being buried deep in the epicardial fat.  

Ultimately, good grafts were obtained.


Description of Procedure: 


The patient was brought to the operating room, placed supine on the operating 

table, anesthetized and intubated.  [place]-Fish catheter is placed through the 

right internal jugular approach.  The anterior torso lower extremities and 

bilateral upper extremities were sterilely prepped and draped.  A right femoral 

arterial line was placed.  The right radial artery was harvested first using 

endovascular vein harvest technique.  Was a good conduit.  Upon excising the 

artery the wrist was closed and the arm tucked at the side.  We then proceeded 

with left radial artery harvest and simultaneous sternotomy.  Left hemisternum 

was retracted upwards and the left internal mammary artery harvested on a 

vascularized pedicle left intact on its origin from the subclavian and divided 

distally.  It was an excellent conduit.  The left pleural space was drained 

with 32-Kazakh chest tube.  This point the endovascular harvest of the left 

radial artery it also been completed.  At arm was sterilely dressed and tucked 

at the side.  Standard sternal retractor was placed.  The right pleural space 

was opened.  500 mL of clear serous fluid was drained from the right pleural 

space.  It are to 2-Kazakh chest tube was placed in the right oral space.


Pericardiotomy was performed.  The pericardium was markedly thickened.  The 

heart was quite ugly being densely encased in a large amount of fat with quite 

a bit of epicardial scarring and thickening present as well.  Heart was exposed 

with pericardial sutures.  The patient was systemically heparinized and ACTs 

were maintained greater than 250 during grafting.  Suction stabilization was 

used for exploration and grafting of the coronary arteries.  The LAD was first 

dissected out.  Once it was identified it was dissected out over about a three-

inch distance.  It was a heavily diseased and diffusely diseased calcific 

vessel.  Eventually we opened it at a soft spot and were able to pass a 1.5 mm 

probe distally toward the apex.  Blood flow was controlled 1.5 mm flow through.

  The LIMA to the LAD anastomosis was performed with running 8-0 Prolene 

suture.  On completion anastomosis flow through was removed effectively probing 

the proximal distal portion of the anastomosis.  Suture was tied with good 

result and hemostasis.  The KADEEM pedicle was tacked surrounding epicardium with 6

-0 silk.  Next the inferior wall was exposed.  The first vessel that we could 

find was the posterior lateral branch.  This was identified cousin was palpable 

due to presence of the stent within it.  We dissected out beyond the stent but 

it was not graftable here due to heavy calcification.  We eventually found the 

PDA.  There was more anterior on the wall of the ventricle.  It was a better 

vessel with much better quality wall.  It was opened and blood flow control the 

14.5 mm flow through.  Radial artery was anastomosed in end-to-side fashion 

with running 7-0 Prolene suture.  The left radial artery was used for the PDA.  

Completion the anastomosis the flow through was removed effectively probing the 

proximal distal portion anastomosis.  Suture was tied with good result and 

hemostasis.  Good backbleeding was noted in the radial artery controlled with a 

bulldog clamp.  Entire length of the radial artery was necessary in order to 

reach the ascending aorta.  A partial occlusion clamp was placed in the 

ascending aorta after appropriate control of blood pressure by anesthesia.  24 

mm punch holes were created in the ascending aorta.  The more inferior one we 

placed the left radial artery leading to the PDA.  Anastomosis was constructed 

with running 6-0 Prolene suture.  The more superior one we grafted on the right 

radial artery.  It was anastomosed also with 6-0 Prolene suture.  Completion 

anastomosis there were de-aired by backbleeding from the PDA and the partial 

occlusion clamp was removed. right radial artery.  The right renal artery was 

performed beneath the LIMA graft and the lateral wall of the heart was 

exposed.was very difficult to identify the obtuse marginal branch but we 

eventually did.  Was buried deep in the fat and was diss and it was very 

disease.  It was dissected out more distally until it disappeared into the 

myocardium.  Here it became soft.  It was opened and blood flow control with a 

1.5 mm flow through.  Radial artery was anastomosed in end-to-side fashion with 

running 7-0 Prolene suture.  On completion anastomosis flow through was removed 

50 probe the proximal distal portion anastomosis.  Suture was tied with good 

result and hemostasis.  Inflow was open.  Heart was lowered in anatomic 

position.  Heparin was reversed with protamine.  Good hemostasis was obtained 

throughout.  Chest was irrigated with antibiotic solution.  The mediastinum was 

drained with 236-Kazakh chest tubes and the sternum closed with 8 sternal 

wires.  Fascia was closed with 0 Ethibond subcutaneous and subcuticular layers 

layers of Vicryl suture.  Dressed with dressings were applied the patient was 

transferred to ICU in stable condition.ected out

## 2019-01-25 LAB
ALBUMIN SERPL-MCNC: 2.5 G/DL (ref 3.5–5)
ALP SERPL-CCNC: 73 U/L (ref 38–126)
ALT SERPL-CCNC: 20 U/L (ref 9–52)
ANION GAP SERPL CALC-SCNC: 8 MMOL/L
APTT BLD: 24.5 SEC (ref 22–30)
AST SERPL-CCNC: 17 U/L (ref 14–36)
BASOPHILS # BLD AUTO: 0 K/UL (ref 0–0.2)
BASOPHILS NFR BLD AUTO: 0 %
BUN SERPL-SCNC: 31 MG/DL (ref 7–17)
CALCIUM SPEC-MCNC: 7.9 MG/DL (ref 8.4–10.2)
CHLORIDE SERPL-SCNC: 111 MMOL/L (ref 98–107)
CO2 SERPL-SCNC: 20 MMOL/L (ref 22–30)
EOSINOPHIL # BLD AUTO: 0 K/UL (ref 0–0.7)
EOSINOPHIL NFR BLD AUTO: 0 %
ERYTHROCYTE [DISTWIDTH] IN BLOOD BY AUTOMATED COUNT: 2.82 M/UL (ref 3.8–5.4)
ERYTHROCYTE [DISTWIDTH] IN BLOOD: 14.9 % (ref 11.5–15.5)
GLUCOSE BLD-MCNC: 115 MG/DL (ref 75–99)
GLUCOSE BLD-MCNC: 116 MG/DL (ref 75–99)
GLUCOSE BLD-MCNC: 118 MG/DL (ref 75–99)
GLUCOSE BLD-MCNC: 122 MG/DL (ref 75–99)
GLUCOSE BLD-MCNC: 130 MG/DL (ref 75–99)
GLUCOSE BLD-MCNC: 133 MG/DL (ref 75–99)
GLUCOSE BLD-MCNC: 134 MG/DL (ref 75–99)
GLUCOSE BLD-MCNC: 141 MG/DL (ref 75–99)
GLUCOSE BLD-MCNC: 143 MG/DL (ref 75–99)
GLUCOSE BLD-MCNC: 145 MG/DL (ref 75–99)
GLUCOSE BLD-MCNC: 148 MG/DL (ref 75–99)
GLUCOSE BLD-MCNC: 149 MG/DL (ref 75–99)
GLUCOSE BLD-MCNC: 151 MG/DL (ref 75–99)
GLUCOSE BLD-MCNC: 152 MG/DL (ref 75–99)
GLUCOSE BLD-MCNC: 154 MG/DL (ref 75–99)
GLUCOSE BLD-MCNC: 167 MG/DL (ref 75–99)
GLUCOSE BLD-MCNC: 75 MG/DL (ref 75–99)
GLUCOSE BLD-MCNC: 89 MG/DL (ref 75–99)
GLUCOSE SERPL-MCNC: 156 MG/DL (ref 74–99)
HCT VFR BLD AUTO: 25.2 % (ref 34–46)
HGB BLD-MCNC: 8 GM/DL (ref 11.4–16)
INR PPP: 1.1 (ref ?–1.2)
LYMPHOCYTES # SPEC AUTO: 0.6 K/UL (ref 1–4.8)
LYMPHOCYTES NFR SPEC AUTO: 7 %
MAGNESIUM SPEC-SCNC: 2.1 MG/DL (ref 1.6–2.3)
MCH RBC QN AUTO: 28.5 PG (ref 25–35)
MCHC RBC AUTO-ENTMCNC: 31.9 G/DL (ref 31–37)
MCV RBC AUTO: 89.4 FL (ref 80–100)
MONOCYTES # BLD AUTO: 0.6 K/UL (ref 0–1)
MONOCYTES NFR BLD AUTO: 7 %
NEUTROPHILS # BLD AUTO: 7.5 K/UL (ref 1.3–7.7)
NEUTROPHILS NFR BLD AUTO: 84 %
PLATELET # BLD AUTO: 210 K/UL (ref 150–450)
POTASSIUM SERPL-SCNC: 5.1 MMOL/L (ref 3.5–5.1)
PROT SERPL-MCNC: 5.1 G/DL (ref 6.3–8.2)
PT BLD: 11.1 SEC (ref 9–12)
SODIUM SERPL-SCNC: 139 MMOL/L (ref 137–145)
WBC # BLD AUTO: 8.9 K/UL (ref 3.8–10.6)

## 2019-01-25 RX ADMIN — CLOPIDOGREL BISULFATE SCH MG: 75 TABLET ORAL at 08:11

## 2019-01-25 RX ADMIN — SODIUM CHLORIDE, PRESERVATIVE FREE SCH ML: 5 INJECTION INTRAVENOUS at 08:12

## 2019-01-25 RX ADMIN — HEPARIN SODIUM SCH UNIT: 5000 INJECTION, SOLUTION INTRAVENOUS; SUBCUTANEOUS at 00:04

## 2019-01-25 RX ADMIN — ASPIRIN 325 MG ORAL TABLET SCH MG: 325 PILL ORAL at 08:11

## 2019-01-25 RX ADMIN — MUPIROCIN SCH APPLIC: 20 OINTMENT TOPICAL at 08:12

## 2019-01-25 RX ADMIN — IPRATROPIUM BROMIDE AND ALBUTEROL SULFATE SCH ML: .5; 3 SOLUTION RESPIRATORY (INHALATION) at 20:19

## 2019-01-25 RX ADMIN — HEPARIN SODIUM SCH UNIT: 5000 INJECTION, SOLUTION INTRAVENOUS; SUBCUTANEOUS at 16:19

## 2019-01-25 RX ADMIN — SODIUM CHLORIDE, PRESERVATIVE FREE SCH ML: 5 INJECTION INTRAVENOUS at 21:50

## 2019-01-25 RX ADMIN — ACETAMINOPHEN SCH MLS/HR: 10 INJECTION, SOLUTION INTRAVENOUS at 05:39

## 2019-01-25 RX ADMIN — METOPROLOL TARTRATE SCH: 25 TABLET, FILM COATED ORAL at 21:44

## 2019-01-25 RX ADMIN — HEPARIN SODIUM SCH UNIT: 5000 INJECTION, SOLUTION INTRAVENOUS; SUBCUTANEOUS at 08:12

## 2019-01-25 RX ADMIN — CEFAZOLIN SCH GM: 10 INJECTION, POWDER, FOR SOLUTION INTRAVENOUS at 00:03

## 2019-01-25 RX ADMIN — DILTIAZEM HYDROCHLORIDE SCH MLS/HR: 5 INJECTION INTRAVENOUS at 00:25

## 2019-01-25 RX ADMIN — POTASSIUM CHLORIDE SCH MLS/HR: 14.9 INJECTION, SOLUTION INTRAVENOUS at 12:33

## 2019-01-25 RX ADMIN — IPRATROPIUM BROMIDE AND ALBUTEROL SULFATE SCH ML: .5; 3 SOLUTION RESPIRATORY (INHALATION) at 16:02

## 2019-01-25 RX ADMIN — CEFAZOLIN SCH GM: 10 INJECTION, POWDER, FOR SOLUTION INTRAVENOUS at 08:11

## 2019-01-25 RX ADMIN — ACETAMINOPHEN SCH MLS/HR: 10 INJECTION, SOLUTION INTRAVENOUS at 17:47

## 2019-01-25 RX ADMIN — DILTIAZEM HYDROCHLORIDE SCH MG: 30 TABLET, FILM COATED ORAL at 08:15

## 2019-01-25 RX ADMIN — METOPROLOL TARTRATE SCH MG: 25 TABLET, FILM COATED ORAL at 08:11

## 2019-01-25 RX ADMIN — IPRATROPIUM BROMIDE AND ALBUTEROL SULFATE SCH ML: .5; 3 SOLUTION RESPIRATORY (INHALATION) at 07:11

## 2019-01-25 RX ADMIN — MUPIROCIN SCH APPLIC: 20 OINTMENT TOPICAL at 21:50

## 2019-01-25 RX ADMIN — ATORVASTATIN CALCIUM SCH MG: 40 TABLET, FILM COATED ORAL at 08:11

## 2019-01-25 RX ADMIN — DOCUSATE SODIUM AND SENNOSIDES SCH EACH: 50; 8.6 TABLET ORAL at 21:49

## 2019-01-25 RX ADMIN — ONDANSETRON PRN MG: 2 INJECTION INTRAMUSCULAR; INTRAVENOUS at 06:15

## 2019-01-25 RX ADMIN — IPRATROPIUM BROMIDE AND ALBUTEROL SULFATE SCH ML: .5; 3 SOLUTION RESPIRATORY (INHALATION) at 10:38

## 2019-01-25 RX ADMIN — DILTIAZEM HYDROCHLORIDE SCH MG: 30 TABLET, FILM COATED ORAL at 17:47

## 2019-01-25 RX ADMIN — ACETAMINOPHEN SCH MLS/HR: 10 INJECTION, SOLUTION INTRAVENOUS at 00:04

## 2019-01-25 RX ADMIN — ACETAMINOPHEN SCH MLS/HR: 10 INJECTION, SOLUTION INTRAVENOUS at 12:47

## 2019-01-25 RX ADMIN — ONDANSETRON PRN MG: 2 INJECTION INTRAMUSCULAR; INTRAVENOUS at 13:38

## 2019-01-25 RX ADMIN — DILTIAZEM HYDROCHLORIDE SCH MG: 30 TABLET, FILM COATED ORAL at 12:33

## 2019-01-25 NOTE — P.CRDCN
History of Present Illness


Consult date: 19


Chief complaint: Status post open heart surgery with bypass


History of present illness: 





This is a pleasant 68-year-old  female patient with past medical 

history the patient does have coronary artery disease with prior coronary 

artery stenting, diabetes type 2, hypertension, and dyslipidemia.who we 

consulted to see for cardiac follow-up after coronary artery bypass grafting.





Earlier this month, 2019, the patient presented to the hospital with 

shortness of breath.  She was ruled in for acute non-ST deviation myocardial 

infarction with abnormal EKG as well as abnormal cardiac enzymes.  At that 

point she underwent heart catheterization by Dr. Thayer and that revealed severe 

triple-vessel coronary artery disease.  Because of that the patient was 

referred to undergo coronary artery bypass grafting.





The patient underwent yesterday coronary artery bypass grafting 3 where she 

received LIMA to LAD, HUDSON to OM, and radial artery to right coronary artery.





This is postoperative patient day #1.  The patient was extubated last night.  

She is doing overall good giving her previous status of severe underlying 

coronary artery disease as well as cardiomyopathy. She has been maintaining 

normal sinus mechanism.  Hemodynamically she is still requiring small doses of 

vasopressors.  Beside that she is on nitroglycerin drip as well as Cardizem 

drip for the radial bypasses.  The Cardizem drip is going to come off later on 

today as well as a nitro drip.  The patient will be started on calcium channel 

blocker by mouth either with Norvasc or with Cardizem by mouth.  Beside that 

she continues to be on dual antiplatelet therapy with aspirin and Plavix as 

well as she is on statin and also she is on metoprolol by mouth.





The last echocardiogram from 2019 revealed impaired LV function with EF 

between 20-25% with basal inferior hypokinesia and inferoseptal hypokinesia.











Past Medical History


Past Medical History: Coronary Artery Disease (CAD), Chest Pain / Angina, 

Diabetes Mellitus, Hyperlipidemia, Myocardial Infarction (MI)


Additional Past Medical History / Comment(s): varicose veins,MI x2


Last Myocardial Infarction Date:: 2006,2019


History of Any Multi-Drug Resistant Organisms: None Reported


Past Surgical History:  Section, Heart Catheterization, Heart 

Catheterization With Stent


Additional Past Surgical History / Comment(s): vein work-last procedure Dec 2018

, stent above knee left leg,heart stents x3


Past Anesthesia/Blood Transfusion Reactions: No Reported Reaction


Additional Past Anesthesia/Blood Transfusion Reaction / Comment(s): no hx blood 

transfusion


Date of Last Stent Placement:: 2006


Past Psychological History: No Psychological Hx Reported


Smoking Status: Former smoker


Past Alcohol Use History: None Reported


Additional Past Alcohol Use History / Comment(s): quit smoking ,smoked 

approx 40 yrs 1ppd


Past Drug Use History: None Reported





- Past Family History


  ** Mother


Family Medical History: Coronary Artery Disease (CAD)


Additional Family Medical History / Comment(s): mother passed away during CABG





  ** Father


Family Medical History: Cancer


Additional Family Medical History / Comment(s): colon CA





  ** Brother(s)


Family Medical History: Cancer


Additional Family Medical History / Comment(s): colon CA





  ** Sister(s)


Family Medical History: Cancer


Additional Family Medical History / Comment(s): breast CA





Medications and Allergies


 Home Medications











 Medication  Instructions  Recorded  Confirmed  Type


 


Aspirin EC [Ecotrin] 325 mg PO DAILY 18 History


 


Lisinopril [Zestril] 5 mg PO DAILY 18 History


 


metFORMIN HCL 1,000 mg PO BID 18 History


 


Glimepiride [Amaryl] 2 mg PO DAILY 19 History


 


Oxybutynin Chloride [Oxybutynin 10 mg PO DAILY 19 History





Chloride ER]    


 


Atorvastatin [Lipitor] 40 mg PO DAILY #30 tab 19 Rx


 


Metoprolol Tartrate [Lopressor] 50 mg PO BID #60 tab 19 Rx


 


Nitroglycerin Sl Tabs [Nitrostat] 0.4 mg SUBLINGUAL Q5M PRN #25 tab 19 Rx


 


Spironolactone [Aldactone] 12.5 mg PO DAILY #30 tab 19 Rx











 Allergies











Allergy/AdvReac Type Severity Reaction Status Date / Time


 


tetracycline Allergy  Rash/Hives Verified 19 14:02














Physical Exam


Vitals: 


 Vital Signs











  Temp Pulse Resp BP Pulse Ox


 


 19 07:13   84    94 L


 


 19 07:00   80  14   96


 


 19 06:30  96.8 F L  83  21   95


 


 19 06:00   81  20   93 L


 


 19 05:30   82  15  96/45  93 L


 


 19 05:00  97.0 F L  81  24   92 L


 


 19 04:30   80  25 H   92 L


 


 19 04:00   84  24   93 L


 


 19 03:30   81  19   93 L


 


 19 03:00  97.0 F L  80  14   93 L


 


 19 02:30   80  16   91 L


 


 19 02:00  96.8 F L  80  16   91 L


 


 19 01:30   78  15   91 L


 


 19 01:00   81  18   91 L


 


 19 00:30   81  16  88/42  91 L


 


 19 00:00  97.0 F L  80  26 H   94 L


 


 19 23:30   79  18   97


 


 19 23:00  97.2 F L  82  18   95


 


 19 22:30  97.3 F L  82  22   95


 


 19 22:00   80  23   96


 


 19 21:30   82  16  80/34  97


 


 19 21:00  97.3 F L  81  26 H   96


 


 19 20:30   82  34 H   95


 


 19 20:28   78   


 


 19 20:14   88   


 


 19 20:00   81  22   97


 


 19 19:30   83  26 H   95


 


 19 19:22      92 L


 


 19 19:00   85  30 H   91 L


 


 19 18:30   72  12  87/43  95


 


 19 18:00   79  13   96


 


 19 17:30   68  12   96


 


 19 17:13   74   


 


 19 17:00   73  14   96


 


 19 16:58   76   


 


 19 16:50   69  12   95


 


 19 16:40   70  14   96


 


 19 16:30   67  14   97


 


 19 16:20   64  14   97


 


 19 16:10   68  17   98


 


 19 16:00   67  12   99


 


 19 15:50   68  16   100


 


 19 15:40   63  19   100


 


 19 15:33      96


 


 19 15:30   70  23   100


 


 19 15:20   68  21   99








 Intake and Output











 19





 22:59 06:59 14:59


 


Intake Total 900.933 706.461 28.852


 


Output Total 809 723 


 


Balance 91.933 -16.539 28.852


 


Intake:   


 


  .8 627.2 


 


    .9NS Pressure Bag 54 21 


 


    0.9NS Cardiac Output 120 40 


 


    ACETAMINOPHEN IV (For NPO  100 





    ) 1,000 mg In Empty Bag 1   





    bag @ 400 mls/hr IVPB   





    Q6HR [facility] Rx#:[id number]   


 


    DOPamine DRIP 800 mg In 30.3 70.7 





    Dextrose/Water 1 500ml.   





    bag @ 3 MCG/KG/MIN 10.22   





    mls/hr IV .Q24H [facility] Rx#:   





    [id number]   


 


    Diltiazem 125 mg In 15 5 





    Sodium Chloride 0.9% 100   





    ml @ 5 MG/HR 5 mls/hr IV   





    .Q24H [facility] Rx#:[id number]   


 


    Diltiazem 50 mg In Sodium  30 





    Chloride 0.9% 40 ml @   





    Per Protocol IV ONCE ONE   





    Rx#:[id number]   


 


    Lactated Ringers 1,000 ml 150 350 





    @ 50 mls/hr IV .Q20H [facility]   





    Rx#:[id number]   


 


    Nitroglycerin-D5w Pmx 50 4.5 10.5 





    mg In Dextrose/Water 1   





    250ml.bag @ Per Protocol   





    IV ONCE ONE Rx#:[id number]   


 


  Intake, IV Titration 527.133 49.261 28.852





  Amount   


 


    DOPamine DRIP 800 mg In 40.4  





    Dextrose/Water 1 500ml.   





    bag @ 3 MCG/KG/MIN 10.22   





    mls/hr IV .Q24H [facility] Rx#:   





    [id number]   


 


    Diltiazem 125 mg In 20 43.083 





    Sodium Chloride 0.9% 100   





    ml @ 5 MG/HR 5 mls/hr IV   





    .Q24H [facility] Rx#:[id number]   


 


    Insulin Regular 100 unit 25.934 6.178 5.252





    In Sodium Chloride 0.9%   





    100 ml @ Per Protocol IV   





    .Q0M [facility] Rx#:[id number]   


 


    Lactated Ringers 1,000 ml 200  





    @ 50 mls/hr IV .Q20H [facility]   





    Rx#:[id number]   


 


    Magnesium Sulfate-D5w Pmx 100  





    1 gm In Dextrose/Water 1   





    100ml.bag @ 100 mls/hr   





    IVPB Q1H [facility] Rx#:   





    [id number]   


 


    Nitroglycerin-D5w Pmx 50 6.0  23.6





    mg In Dextrose/Water 1   





    250ml.bag @ 5 MCG/MIN 1.5   





    mls/hr IV .Q24H [facility] Rx#:   





    [id number]   


 


    Norepinephrine 4 mg In 34.799  





    Sodium Chloride 0.9% 250   





    ml @ Titrate IV .Q0M [facility]   





    Rx#:[id number]   


 


    Potassium Chloride 10 meq 100  





    In Water For Injection 1   





    100ml.bag @ 100 mls/hr   





    IVPB Q1H [facility] Rx#:   





    [id number]   


 


  Oral  30 


 


Output:   


 


  Chest Tube Drainage 549 386 


 


    Left Pleural Chest Tube 280 180 


 


    Mediastinal Chext Tube X 195 100 





    2   


 


    Right Pleural Chest Tube 74 106 


 


  Drainage 0  


 


    Left Arm 0  


 


    Right Arm 0  


 


  Urine 260 337 


 


Other:   


 


  Voiding Method Indwelling Catheter Indwelling Catheter 


 


  Weight   97 kg








 ABP, PAP, CO, CI - Last 8 Hours











Arterial Blood Pressure        120/54


 


Arterial Blood Pressure        131/62


 


Arterial Blood Pressure        125/57


 


Arterial Blood Pressure        126/57


 


Arterial Blood Pressure        124/56


 


Arterial Blood Pressure        122/56


 


Arterial Blood Pressure        121/57


 


Arterial Blood Pressure        119/54


 


Arterial Blood Pressure        118/55


 


Arterial Blood Pressure        116/52


 


Arterial Blood Pressure        115/52


 


Arterial Blood Pressure        110/53


 


Arterial Blood Pressure        109/52


 


Arterial Blood Pressure        114/53


 


Arterial Blood Pressure        112/58


 


Arterial Blood Pressure        107/50


 


Pulmonary Artery Pressure      45/21


 


Pulmonary Artery Pressure      54/29


 


Pulmonary Artery Pressure      49/24


 


Pulmonary Artery Pressure      54/25


 


Pulmonary Artery Pressure      51/22


 


Pulmonary Artery Pressure      51/23


 


Pulmonary Artery Pressure      51/25


 


Pulmonary Artery Pressure      51/23


 


Pulmonary Artery Pressure      51/25


 


Pulmonary Artery Pressure      50/23


 


Pulmonary Artery Pressure      52/25


 


Pulmonary Artery Pressure      48/22


 


Pulmonary Artery Pressure      49/23


 


Pulmonary Artery Pressure      51/25


 


Pulmonary Artery Pressure      48/26


 


Pulmonary Artery Pressure      49/22


 


Cardiac Output                 10.1


 


Cardiac Output                 10.1


 


Cardiac Output                 10.1


 


Cardiac Output                 10.1


 


Cardiac Output                 7.3


 


Cardiac Output                 7.3


 


Cardiac Output                 7.3


 


Cardiac Output                 7.3


 


Cardiac Output                 7.3


 


Cardiac Output                 7.3


 


Cardiac Output                 7.3


 


Cardiac Output                 7.3


 


Cardiac Output                 7.3


 


Cardiac Output                 7.3


 


Cardiac Output                 8.2


 


Cardiac Output                 8.2


 


Cardiac Index                  3.7

















- Constitutional


General appearance: no acute distress





- Respiratory


Respiratory: bilateral: diminished





- Cardiovascular


Rhythm: regular


Heart sounds: normal: S1, S2





Results





 19 05:15





 19 05:15


 Cardiac Enzymes











  19 Range/Units





  15:30 20:58 05:15 


 


AST  13 L  14  17  (14-36)  U/L








 Coagulation











  19 Range/Units





  15:30 20:58 05:15 


 


PT  12.1 H  11.3  11.1  (9.0-12.0)  sec


 


APTT  24.5  22.9  24.5  (22.0-30.0)  sec








 CBC











  19 Range/Units





  15:30 17:29 20:58 


 


WBC  12.6 H  11.7 H  11.1 H  (3.8-10.6)  k/uL


 


RBC  2.63 L  2.83 L  2.88 L  (3.80-5.40)  m/uL


 


Hgb  7.7 L D  8.2 L  8.1 L  (11.4-16.0)  gm/dL


 


Hct  23.5 L  25.1 L  25.4 L  (34.0-46.0)  %


 


Plt Count  242  259  231  (150-450)  k/uL














  19 Range/Units





  05:15 


 


WBC  8.9  (3.8-10.6)  k/uL


 


RBC  2.82 L  (3.80-5.40)  m/uL


 


Hgb  8.0 L  (11.4-16.0)  gm/dL


 


Hct  25.2 L  (34.0-46.0)  %


 


Plt Count  210  (150-450)  k/uL








 Comprehensive Metabolic Panel











  19 Range/Units





  15:30 20:58 05:15 


 


Sodium  142  140  139  (137-145)  mmol/L


 


Potassium  3.9  4.1  5.1  (3.5-5.1)  mmol/L


 


Chloride  115 H  112 H  111 H  ()  mmol/L


 


Carbon Dioxide  21 L  23  20 L  (22-30)  mmol/L


 


BUN  28 H  30 H  31 H  (7-17)  mg/dL


 


Creatinine  1.25 H  1.33 H  1.25 H  (0.52-1.04)  mg/dL


 


Glucose  150 H  144 H  156 H  (74-99)  mg/dL


 


Calcium  7.5 L  8.0 L  7.9 L  (8.4-10.2)  mg/dL


 


AST  13 L  14  17  (14-36)  U/L


 


ALT  27  21  20  (9-52)  U/L


 


Alkaline Phosphatase  63  69  73  ()  U/L


 


Total Protein  4.7 L  5.2 L  5.1 L  (6.3-8.2)  g/dL


 


Albumin  2.3 L  2.7 L  2.5 L  (3.5-5.0)  g/dL








 Current Medications











Generic Name Dose Route Start Last Admin





  Trade Name Freq  PRN Reason Stop Dose Admin


 


Hydrocodone Bitart/Acetaminophen  2 each  19 14:37  





  Norco 5-325  PO   





  Q4HR PRN   





  Severe Pain   





     





     





     


 


Hydrocodone Bitart/Acetaminophen  1 each  19 14:37  





  Norco 5-325  PO   





  Q4HR PRN   





  Moderate Pain   





     





     





     


 


Albuterol/Ipratropium  3 ml  19 14:43  





  Duoneb 0.5 Mg-3 Mg/3 Ml Soln  INHALATION   





  RT-Q2H PRN   





  Shortness Of Breath Or Wheezing   





     





     





     


 


Albuterol/Ipratropium  3 ml  19 20:00  19 07:11





  Duoneb 0.5 Mg-3 Mg/3 Ml Soln  INHALATION   3 ml





  RT-QID [facility]   Administration





     





     





     





     


 


Aspirin  325 mg  19 09:00  





  Aspirin  PO   





  DAILY [facility]   





     





     





     





     


 


Atorvastatin Calcium  40 mg  19 09:00  





  Lipitor  PO   





  DAILY [facility]   





     





     





     





     


 


Benzocaine  1 spray  19 16:11  





  Hurricaine Spray  MUCOUS MEM   





  QID PRN   





  Mouth Irritation   





     





     





     


 


Benzocaine/Menthol  1 each  19 14:43  





  Cepacol Lozenge  MUCOUS MEM   





  Q2H PRN   





  Sore Throat   





     





     





     


 


Bisacodyl  10 mg  19 14:37  





  Dulcolax  RECTAL   





  DAILY PRN   





  Constipation   





     





     





     


 


Cefazolin Sodium  2 gm  19 16:00  19 00:03





  Kefzol  IVP  19 08:01  2 gm





  Q8HR [facility]   Administration





     





     





     





     


 


Clopidogrel Bisulfate  75 mg  19 09:00  





  Plavix  PO   





  DAILY [facility]   





     





     





     





     


 


Fentanyl Citrate  25 mcg  19 16:10  19 20:50





  Sublimaze  IVP   25 mcg





  Q2HR PRN   Administration





  Pain   





     





     





     


 


Heparin Sodium (Porcine)  5,000 unit  19 00:00  19 00:04





  Heparin  SQ   5,000 unit





  Q8HR [facility]   Administration





     





     





     





     


 


Acetaminophen 1,000 mg/ IV  100 mls @ 400 mls/hr  19 18:00  19 05:39





  Solution  IVPB  19 18:01  400 mls/hr





  Q6HR [facility]   Administration





     





     





     





     


 


Albumin Human 250 ml/ IV  250 mls @ 250 mls/hr  19 14:43  





  Solution  IVPB  19 14:44  





  Q1HR PRN   





  For Volume   





     





     





     


 


Calcium Chloride 1,000 mg/  110 mls @ 100 mls/hr  19 14:43  





  Sodium Chloride  IV  19 14:44  





  ONCE PRN   





  Ionized Calcium less than 4.4   





     





     





     


 


Diltiazem HCl 125 mg/ Sodium  125 mls @ 5 mls/hr  19 14:43  19 00:25





  Chloride  IV   5 mg/hr





  .Q24H [facility]   5 mls/hr





     Administration





     





     





  5 MG/HR   


 


Clevidipine 25 mg/ IV Solution  50 mls @ 2 mls/hr  19 14:43  19 15:

48





  IV   Not Given





  .Q24H [facility]   





     





     





  Protocol   





  1 MG/HR   


 


Dopamine HCl/Dextrose 800 mg/  500 mls @ 10.22 mls/hr  19 14:43  19 

15:30





  IV Solution  IV   3 mcg/kg/min





  .Q24H [facility]   10.22 mls/hr





     Administration





     





     





  3 MCG/KG/MIN   


 


Insulin Human Regular 100 unit  101 mls @ 0 mls/hr  19 14:43  19 07:

07





  / Sodium Chloride  IV   4 units/hr





  .Q0M [facility]   4.04 mls/hr





     Titration





     





  Protocol   





  Per Protocol   


 


Lactated Ringer's  1,000 mls @ 50 mls/hr  19 14:43  19 15:30





  Lactated Ringers  IV   50 mls/hr





  .Q20H [facility]   Administration





     





     





     





     


 


Nitroglycerin/Dextrose 50 mg/  250 mls @ 1.5 mls/hr  19 14:43  19 07

:14





  IV Solution  IV   0 mcg/min





  .Q24H [facility]   0 mls/hr





     Infusion





     





     





  5 MCG/MIN   


 


Norepinephrine Bitartrate 4 mg  254 mls @ 0 mls/hr  19 14:43  19 22:

18





  / Sodium Chloride  IV   0 mcg/min





  .Q0M [facility]   0 mls/hr





     Titration





     





  Protocol   





  Titrate   


 


Propofol 1,000 mg/ IV Solution  100 mls @ 0 mls/hr  19 14:43  





  IV   





  .Q0M [facility]   





     





     





  Protocol   





  Titrate   


 


Magnesium Hydroxide  2,400 mg  19 14:37  





  Milk Of Magnesia  PO   





  BID PRN   





  Constipation   





     





     





     


 


Metoclopramide HCl  10 mg  19 14:43  





  Reglan  IVP   





  Q4H PRN   





  Nausea And Vomiting   





     





     





     


 


Metoprolol Tartrate  12.5 mg  19 09:00  





  Lopressor  PO   





  BID [facility]   





     





     





     





     


 


Miscellaneous Information  1 each  19 14:43  





  Magnesium Per Protocol  MISCELLANE   





  DAILY PRN   





  Per Protocol   





     





  Protocol   





     


 


Miscellaneous Information  1 each  19 14:43  





  Phosphorus Per Protocol  MISCELLANE   





  DAILY PRN   





  Per Protocol   





     





  Protocol   





     


 


Miscellaneous Information  1 each  19 14:43  





  Potassium Per Protocol  MISCELLANE   





  DAILY PRN   





  Per Protocol   





     





  Protocol   





     


 


Mupirocin  1 applic  19 21:00  19 22:54





  Bactroban Oint  NASAL  19 21:01  1 applic





  BID [facility]   Administration





     





     





     





     


 


Ondansetron HCl  4 mg  19 14:43  19 06:15





  Zofran  IVP   4 mg





  Q6HR PRN   Administration





  Nausea And Vomiting   





     





     





     


 


Oxycodone HCl  10 mg  19 14:43  19 05:39





  Oxyir  PO  19 14:44  10 mg





  Q4H PRN   Administration





  Severe Pain   





     





     





     


 


Oxycodone HCl  5 mg  19 14:43  19 17:41





  Oxyir  PO  19 14:44  5 mg





  Q4H PRN   Administration





  Moderate Pain   





     





     





     


 


Pantoprazole Sodium  40 mg  19 09:00  





  Protonix  IVP   





  DAILY [facility]   





     





     





     





     


 


Senna/Docusate Sodium  2 each  19 21:00  





  Senokot-S  PO   





  HS [facility]   





     





     





     





     


 


Sodium Chloride  10 ml  19 21:00  19 21:53





  Saline Flush  IV   10 ml





  BID [facility]   Administration





     





     





     





     








 Intake and Output











 19





 22:59 06:59 14:59


 


Intake Total 900.933 706.461 28.852


 


Output Total 809 723 


 


Balance 91.933 -16.539 28.852


 


Intake:   


 


  .8 627.2 


 


    .9NS Pressure Bag 54 21 


 


    0.9NS Cardiac Output 120 40 


 


    ACETAMINOPHEN IV (For NPO  100 





    ) 1,000 mg In Empty Bag 1   





    bag @ 400 mls/hr IVPB   





    Q6HR [facility] Rx#:[id number]   


 


    DOPamine DRIP 800 mg In 30.3 70.7 





    Dextrose/Water 1 500ml.   





    bag @ 3 MCG/KG/MIN 10.22   





    mls/hr IV .Q24H [facility] Rx#:   





    [id number]   


 


    Diltiazem 125 mg In 15 5 





    Sodium Chloride 0.9% 100   





    ml @ 5 MG/HR 5 mls/hr IV   





    .Q24H [facility] Rx#:[id number]   


 


    Diltiazem 50 mg In Sodium  30 





    Chloride 0.9% 40 ml @   





    Per Protocol IV ONCE ONE   





    Rx#:[id number]   


 


    Lactated Ringers 1,000 ml 150 350 





    @ 50 mls/hr IV .Q20H [facility]   





    Rx#:[id number]   


 


    Nitroglycerin-D5w Pmx 50 4.5 10.5 





    mg In Dextrose/Water 1   





    250ml.bag @ Per Protocol   





    IV ONCE ONE Rx#:[id number]   


 


  Intake, IV Titration 527.133 49.261 28.852





  Amount   


 


    DOPamine DRIP 800 mg In 40.4  





    Dextrose/Water 1 500ml.   





    bag @ 3 MCG/KG/MIN 10.22   





    mls/hr IV .Q24H [facility] Rx#:   





    [id number]   


 


    Diltiazem 125 mg In 20 43.083 





    Sodium Chloride 0.9% 100   





    ml @ 5 MG/HR 5 mls/hr IV   





    .Q24H [facility] Rx#:[id number]   


 


    Insulin Regular 100 unit 25.934 6.178 5.252





    In Sodium Chloride 0.9%   





    100 ml @ Per Protocol IV   





    .Q0M [facility] Rx#:[id number]   


 


    Lactated Ringers 1,000 ml 200  





    @ 50 mls/hr IV .Q20H [facility]   





    Rx#:[id number]   


 


    Magnesium Sulfate-D5w Pmx 100  





    1 gm In Dextrose/Water 1   





    100ml.bag @ 100 mls/hr   





    IVPB Q1H [facility] Rx#:   





    [id number]   


 


    Nitroglycerin-D5w Pmx 50 6.0  23.6





    mg In Dextrose/Water 1   





    250ml.bag @ 5 MCG/MIN 1.5   





    mls/hr IV .Q24H [facility] Rx#:   





    [id number]   


 


    Norepinephrine 4 mg In 34.799  





    Sodium Chloride 0.9% 250   





    ml @ Titrate IV .Q0M [facility]   





    Rx#:[id number]   


 


    Potassium Chloride 10 meq 100  





    In Water For Injection 1   





    100ml.bag @ 100 mls/hr   





    IVPB Q1H [facility] Rx#:   





    [id number]   


 


  Oral  30 


 


Output:   


 


  Chest Tube Drainage 549 386 


 


    Left Pleural Chest Tube 280 180 


 


    Mediastinal Chext Tube X 195 100 





    2   


 


    Right Pleural Chest Tube 74 106 


 


  Drainage 0  


 


    Left Arm 0  


 


    Right Arm 0  


 


  Urine 260 337 


 


Other:   


 


  Voiding Method Indwelling Catheter Indwelling Catheter 


 


  Weight   97 kg








 Patient Weight











 19





 06:59


 


Weight 97 kg








 





 19 05:15 





 19 05:15 











Assessment and Plan


Assessment: 





Assessment


#1 severe underlying coronary artery disease as described above


#2 status post coronary artery bypass grafting 3 as described above


#3 diabetes type 2


#4 hypertension


#5 dyslipidemia





Plan


#1 continue the current medical regimen including dual antiplatelet therapy 

along with statin and metoprolol


#2 try to get the patient off the nitro drip as well as of the Cardizem drip 


#3 later on to be started on calcium channel blocker by mouth


#4 continue monitor the hemoglobin as well as kidney function 


#5 continue monitor the chest x-ray and follow-up on the chest x-ray from today


#6 follow-up with the patient.





Thank you for allowing us participate in her care and we will continue 

following up with the patient

## 2019-01-25 NOTE — P.PN
Subjective


Progress Note Date: 01/25/19


Principal diagnosis: 





Status post CABG, postoperative day #1





Patient was seen today on 1/25/2019, she is postoperative day #1, off pump 

coronary artery bypass graft surgery 3, LIMA to LAD, right radial artery graft 

to the obtuse marginal and left radial arterial graft to the posterior 

descending artery, bilateral endovascular radial artery harvesting.  Patient 

was extubated uneventfully yesterday shortly after she was seen in the 

intensive care unit.  She tolerated the extubation well, and she is now on few 

liters nasal cannula.  Patient is relatively asymptomatic, she was maintained 

on Cardizem IV nitro IV dopamine overnight, she is hemodynamically stable, and 

denies any specific complaints today.  Chest x-ray is suggestive of mild 

postoperative interstitial edema.  And bibasilar atelectasis, expected 

postoperatively all labs were reviewed, hemoglobin is 8.0 today.  Renal profile 

is about baseline, creatinine is 1.25.  It was 1.33 yesterday.





Objective





- Vital Signs


Vital signs: 


 Vital Signs











Temp  97.4 F L  01/25/19 08:00


 


Pulse  74   01/25/19 10:54


 


Resp  12   01/25/19 08:00


 


BP  96/45   01/25/19 05:30


 


Pulse Ox  97   01/25/19 08:00








 Intake & Output











 01/24/19 01/25/19 01/25/19





 18:59 06:59 18:59


 


Intake Total 235.385 6999.651 177.960


 


Output Total 1340 1292 335


 


Balance -488.657 -53.349 -157.040


 


Weight   97 kg


 


Intake:   


 


   1054.6 127.6


 


    .9NS Pressure Bag 36 42 6


 


    0.9NS Cardiac Output 80 80 


 


    ACETAMINOPHEN IV (For NPO  200 





    ) 1,000 mg In Empty Bag 1   





    bag @ 400 mls/hr IVPB   





    Q6HR ONESIMO Rx#:[id number]   


 


    DOPamine DRIP 800 mg In  111.1 10.1





    Dextrose/Water 1 500ml.   





    bag @ 3 MCG/KG/MIN 10.22   





    mls/hr IV .Q24H ONESIMO Rx#:   





    [id number]   


 


    Diltiazem 125 mg In  20 





    Sodium Chloride 0.9% 100   





    ml @ 5 MG/HR 5 mls/hr IV   





    .Q24H ONESIMO Rx#:[id number]   


 


    Diltiazem 50 mg In Sodium  35 10





    Chloride 0.9% 40 ml @   





    Per Protocol IV ONCE ONE   





    Rx#:[id number]   


 


    Lactated Ringers 1,000 ml  550 100





    @ 20 mls/hr IV .Q24H ONESIMO   





    Rx#:[id number]   


 


    Nitroglycerin-D5w Pmx 50  16.5 1.5





    mg In Dextrose/Water 1   





    250ml.bag @ Per Protocol   





    IV ONCE ONE Rx#:[id number]   


 


  Intake, IV Titration 422.343 154.051 50.360





  Amount   


 


    DOPamine DRIP 800 mg In 30.3 10.1 





    Dextrose/Water 1 500ml.   





    bag @ 3 MCG/KG/MIN 10.22   





    mls/hr IV .Q24H ONESIMO Rx#:   





    [id number]   


 


    Diltiazem 125 mg In 15 48.083 





    Sodium Chloride 0.9% 100   





    ml @ 5 MG/HR 5 mls/hr IV   





    .Q24H ONESIMO Rx#:[id number]   


 


    Insulin Regular 100 unit 6.604 25.508 26.760





    In Sodium Chloride 0.9%   





    100 ml @ Per Protocol IV   





    .Q0M ONESIMO Rx#:[id number]   


 


    Lactated Ringers 1,000 ml 150 50 





    @ 20 mls/hr IV .Q24H ONESIMO   





    Rx#:[id number]   


 


    Magnesium Sulfate-D5w Pmx 100  





    1 gm In Dextrose/Water 1   





    100ml.bag @ 100 mls/hr   





    IVPB Q1H ONESIMO Rx#:   





    [id number]   


 


    Nitroglycerin-D5w Pmx 50 4.5 1.5 23.6





    mg In Dextrose/Water 1   





    250ml.bag @ 5 MCG/MIN 1.5   





    mls/hr IV .Q24H ONESIMO Rx#:   





    [id number]   


 


    Norepinephrine 4 mg In 15.939 18.86 





    Sodium Chloride 0.9% 250   





    ml @ Titrate IV .Q0M ONESIMO   





    Rx#:[id number]   


 


    Potassium Chloride 10 meq 100  





    In Water For Injection 1   





    100ml.bag @ 100 mls/hr   





    IVPB Q1H ONESIMO Rx#:   





    [id number]   


 


  Oral  30 


 


  Blood Product 310  


 


    Rc Cpda-1  Unit 310  





    [id number]   


 


Output:   


 


  Chest Tube Drainage 285 710 260


 


    Left Pleural Chest Tube 150 330 120


 


    Mediastinal Chext Tube X 105 190 60





    2   


 


    Right Pleural Chest Tube 30 190 80


 


  Drainage  40 


 


    Left Arm  20 


 


    Right Arm  20 


 


  Urine 255 542 75


 


  Estimated Blood Loss 800  


 


Other:   


 


  Voiding Method Indwelling Catheter Indwelling Catheter Indwelling Catheter








 ABP, PAP, CO, CI - Last Documented











Arterial Blood Pressure        107/50


 


Pulmonary Artery Pressure      39/20


 


Cardiac Output                 6.0


 


Cardiac Index                  2.9

















- Exam





Physical Exam: Revealed a 68-year-old female, pleasant, in no distress.  On 7 L 

high flow nasal cannula with O2 saturation of 95%.


Head: Atraumatic normocephalic.


Lymphatics: No lymphadenopathy.


HEENT:[Neck is supple.] [No neck masses.] [No thyromegaly.] [No JVD.]


Chest: [Minimal fine crackles at the bases.  Patient is achieving 500 mL via 

incentive spirometry.  Mediastinal chest tubes and right-sided pleural chest 

tubes were noted.  Not much of the significant drainage.


Cardiac Exam: [Normal S1 and S2, no S3 gallop, no murmur.]


Abdomen: [Soft, nontender,  no megaly, no rebound, no guarding, normal bowel 

sounds.]


Extremities: [No clubbing, trace of bipedal edema, no cyanosis.]


Neurological Exam: [No focal neurologic deficit.


Normal mood, affect and mental status examination.]





- Labs


CBC & Chem 7: 


 01/25/19 05:15





 01/25/19 05:15


Labs: 


 Abnormal Lab Results - Last 24 Hours (Table)











  01/17/19 01/24/19 01/24/19 Range/Units





  08:25 08:57 08:57 


 


WBC     (3.8-10.6)  k/uL


 


RBC     (3.80-5.40)  m/uL


 


Hgb     (11.4-16.0)  gm/dL


 


Hct     (34.0-46.0)  %


 


Neutrophils #     (1.3-7.7)  k/uL


 


Lymphocytes #     (1.0-4.8)  k/uL


 


PT     (9.0-12.0)  sec


 


INR     (<1.2)  


 


ABG pH     (7.35-7.45)  


 


ABG pCO2   34 L   (35-45)  mmHg


 


ABG pO2   >420 H  160 H  ()  mmHg


 


ABG HCO3     (21-25)  mmol/L


 


ABG Total CO2     (19-24)  mmol/L


 


ABG O2 Saturation   100.0 H  99.7 H  (94-97)  %


 


ABG Hematocrit   24 L  23 L  (34.0-46.0)  %


 


ABG Ionized Calcium     (4.5-5.3)  mg/dL


 


ABG Glucose   145 H  143 H  (75-99)  mg/dL


 


Hemoglobin   7.7 L  7.5 L  (11.4-16.0)  gm/dL


 


Chloride     ()  mmol/L


 


Carbon Dioxide     (22-30)  mmol/L


 


BUN     (7-17)  mg/dL


 


Creatinine     (0.52-1.04)  mg/dL


 


Glucose     (74-99)  mg/dL


 


POC Glucose (mg/dL)     (75-99)  mg/dL


 


Calcium     (8.4-10.2)  mg/dL


 


Ionized Calcium Kristyn     (4.5-5.3)  mg/dL


 


AST     (14-36)  U/L


 


Total Protein     (6.3-8.2)  g/dL


 


Albumin     (3.5-5.0)  g/dL


 


Arterial Blood Glucose   145 H  143 H  (75-99)  mg/dL


 


Crossmatch  See Detail    














  01/24/19 01/24/19 01/24/19 Range/Units





  12:42 13:09 13:45 


 


WBC     (3.8-10.6)  k/uL


 


RBC     (3.80-5.40)  m/uL


 


Hgb     (11.4-16.0)  gm/dL


 


Hct     (34.0-46.0)  %


 


Neutrophils #     (1.3-7.7)  k/uL


 


Lymphocytes #     (1.0-4.8)  k/uL


 


PT     (9.0-12.0)  sec


 


INR     (<1.2)  


 


ABG pH    7.33 L  (7.35-7.45)  


 


ABG pCO2     (35-45)  mmHg


 


ABG pO2  173 H  226 H  66 L  ()  mmHg


 


ABG HCO3     (21-25)  mmol/L


 


ABG Total CO2  25 H   25 H  (19-24)  mmol/L


 


ABG O2 Saturation  99.6 H  100.0 H  92.3 L  (94-97)  %


 


ABG Hematocrit  23 L  21 L  25 L  (34.0-46.0)  %


 


ABG Ionized Calcium  4.4 L  4.3 L  4.3 L  (4.5-5.3)  mg/dL


 


ABG Glucose  166 H  141 H  153 H  (75-99)  mg/dL


 


Hemoglobin  7.4 L  6.7 L*  8.0 L  (11.4-16.0)  gm/dL


 


Chloride     ()  mmol/L


 


Carbon Dioxide     (22-30)  mmol/L


 


BUN     (7-17)  mg/dL


 


Creatinine     (0.52-1.04)  mg/dL


 


Glucose     (74-99)  mg/dL


 


POC Glucose (mg/dL)     (75-99)  mg/dL


 


Calcium     (8.4-10.2)  mg/dL


 


Ionized Calcium Kristyn     (4.5-5.3)  mg/dL


 


AST     (14-36)  U/L


 


Total Protein     (6.3-8.2)  g/dL


 


Albumin     (3.5-5.0)  g/dL


 


Arterial Blood Glucose  166 H  141 H  153 H  (75-99)  mg/dL


 


Crossmatch     














  01/24/19 01/24/19 01/24/19 Range/Units





  14:13 15:30 15:30 


 


WBC    12.6 H  (3.8-10.6)  k/uL


 


RBC    2.63 L  (3.80-5.40)  m/uL


 


Hgb    7.7 L D  (11.4-16.0)  gm/dL


 


Hct    23.5 L  (34.0-46.0)  %


 


Neutrophils #    8.9 H  (1.3-7.7)  k/uL


 


Lymphocytes #     (1.0-4.8)  k/uL


 


PT     (9.0-12.0)  sec


 


INR     (<1.2)  


 


ABG pH     (7.35-7.45)  


 


ABG pCO2     (35-45)  mmHg


 


ABG pO2  231 H    ()  mmHg


 


ABG HCO3     (21-25)  mmol/L


 


ABG Total CO2  25 H    (19-24)  mmol/L


 


ABG O2 Saturation  100.0 H    (94-97)  %


 


ABG Hematocrit  24 L    (34.0-46.0)  %


 


ABG Ionized Calcium  4.2 L    (4.5-5.3)  mg/dL


 


ABG Glucose  157 H    (75-99)  mg/dL


 


Hemoglobin  7.7 L    (11.4-16.0)  gm/dL


 


Chloride     ()  mmol/L


 


Carbon Dioxide     (22-30)  mmol/L


 


BUN     (7-17)  mg/dL


 


Creatinine     (0.52-1.04)  mg/dL


 


Glucose     (74-99)  mg/dL


 


POC Glucose (mg/dL)   156 H   (75-99)  mg/dL


 


Calcium     (8.4-10.2)  mg/dL


 


Ionized Calcium Kristyn     (4.5-5.3)  mg/dL


 


AST     (14-36)  U/L


 


Total Protein     (6.3-8.2)  g/dL


 


Albumin     (3.5-5.0)  g/dL


 


Arterial Blood Glucose  157 H    (75-99)  mg/dL


 


Crossmatch     














  01/24/19 01/24/19 01/24/19 Range/Units





  15:30 15:30 15:56 


 


WBC     (3.8-10.6)  k/uL


 


RBC     (3.80-5.40)  m/uL


 


Hgb     (11.4-16.0)  gm/dL


 


Hct     (34.0-46.0)  %


 


Neutrophils #     (1.3-7.7)  k/uL


 


Lymphocytes #     (1.0-4.8)  k/uL


 


PT   12.1 H   (9.0-12.0)  sec


 


INR   1.2 H   (<1.2)  


 


ABG pH    7.32 L  (7.35-7.45)  


 


ABG pCO2     (35-45)  mmHg


 


ABG pO2    300 H  ()  mmHg


 


ABG HCO3     (21-25)  mmol/L


 


ABG Total CO2     (19-24)  mmol/L


 


ABG O2 Saturation    100.0 H  (94-97)  %


 


ABG Hematocrit     (34.0-46.0)  %


 


ABG Ionized Calcium     (4.5-5.3)  mg/dL


 


ABG Glucose     (75-99)  mg/dL


 


Hemoglobin     (11.4-16.0)  gm/dL


 


Chloride  115 H    ()  mmol/L


 


Carbon Dioxide  21 L    (22-30)  mmol/L


 


BUN  28 H    (7-17)  mg/dL


 


Creatinine  1.25 H    (0.52-1.04)  mg/dL


 


Glucose  150 H    (74-99)  mg/dL


 


POC Glucose (mg/dL)     (75-99)  mg/dL


 


Calcium  7.5 L    (8.4-10.2)  mg/dL


 


Ionized Calcium Kristyn  4.4 L    (4.5-5.3)  mg/dL


 


AST  13 L    (14-36)  U/L


 


Total Protein  4.7 L    (6.3-8.2)  g/dL


 


Albumin  2.3 L    (3.5-5.0)  g/dL


 


Arterial Blood Glucose     (75-99)  mg/dL


 


Crossmatch     














  01/24/19 01/24/19 01/24/19 Range/Units





  16:08 17:29 17:35 


 


WBC   11.7 H   (3.8-10.6)  k/uL


 


RBC   2.83 L   (3.80-5.40)  m/uL


 


Hgb   8.2 L   (11.4-16.0)  gm/dL


 


Hct   25.1 L   (34.0-46.0)  %


 


Neutrophils #   9.8 H   (1.3-7.7)  k/uL


 


Lymphocytes #   0.9 L   (1.0-4.8)  k/uL


 


PT     (9.0-12.0)  sec


 


INR     (<1.2)  


 


ABG pH     (7.35-7.45)  


 


ABG pCO2     (35-45)  mmHg


 


ABG pO2     ()  mmHg


 


ABG HCO3     (21-25)  mmol/L


 


ABG Total CO2     (19-24)  mmol/L


 


ABG O2 Saturation     (94-97)  %


 


ABG Hematocrit     (34.0-46.0)  %


 


ABG Ionized Calcium     (4.5-5.3)  mg/dL


 


ABG Glucose     (75-99)  mg/dL


 


Hemoglobin     (11.4-16.0)  gm/dL


 


Chloride     ()  mmol/L


 


Carbon Dioxide     (22-30)  mmol/L


 


BUN     (7-17)  mg/dL


 


Creatinine     (0.52-1.04)  mg/dL


 


Glucose     (74-99)  mg/dL


 


POC Glucose (mg/dL)  166 H   179 H  (75-99)  mg/dL


 


Calcium     (8.4-10.2)  mg/dL


 


Ionized Calcium Kristyn     (4.5-5.3)  mg/dL


 


AST     (14-36)  U/L


 


Total Protein     (6.3-8.2)  g/dL


 


Albumin     (3.5-5.0)  g/dL


 


Arterial Blood Glucose     (75-99)  mg/dL


 


Crossmatch     














  01/24/19 01/24/19 01/24/19 Range/Units





  18:24 18:52 18:57 


 


WBC     (3.8-10.6)  k/uL


 


RBC     (3.80-5.40)  m/uL


 


Hgb     (11.4-16.0)  gm/dL


 


Hct     (34.0-46.0)  %


 


Neutrophils #     (1.3-7.7)  k/uL


 


Lymphocytes #     (1.0-4.8)  k/uL


 


PT     (9.0-12.0)  sec


 


INR     (<1.2)  


 


ABG pH    7.32 L  (7.35-7.45)  


 


ABG pCO2     (35-45)  mmHg


 


ABG pO2    71 L  ()  mmHg


 


ABG HCO3    20 L  (21-25)  mmol/L


 


ABG Total CO2     (19-24)  mmol/L


 


ABG O2 Saturation     (94-97)  %


 


ABG Hematocrit     (34.0-46.0)  %


 


ABG Ionized Calcium     (4.5-5.3)  mg/dL


 


ABG Glucose     (75-99)  mg/dL


 


Hemoglobin     (11.4-16.0)  gm/dL


 


Chloride     ()  mmol/L


 


Carbon Dioxide     (22-30)  mmol/L


 


BUN     (7-17)  mg/dL


 


Creatinine     (0.52-1.04)  mg/dL


 


Glucose     (74-99)  mg/dL


 


POC Glucose (mg/dL)  179 H  162 H   (75-99)  mg/dL


 


Calcium     (8.4-10.2)  mg/dL


 


Ionized Calcium Kristyn     (4.5-5.3)  mg/dL


 


AST     (14-36)  U/L


 


Total Protein     (6.3-8.2)  g/dL


 


Albumin     (3.5-5.0)  g/dL


 


Arterial Blood Glucose     (75-99)  mg/dL


 


Crossmatch     














  01/24/19 01/24/19 01/24/19 Range/Units





  20:56 20:58 20:58 


 


WBC   11.1 H   (3.8-10.6)  k/uL


 


RBC   2.88 L   (3.80-5.40)  m/uL


 


Hgb   8.1 L   (11.4-16.0)  gm/dL


 


Hct   25.4 L   (34.0-46.0)  %


 


Neutrophils #   9.4 H   (1.3-7.7)  k/uL


 


Lymphocytes #   0.6 L   (1.0-4.8)  k/uL


 


PT     (9.0-12.0)  sec


 


INR     (<1.2)  


 


ABG pH     (7.35-7.45)  


 


ABG pCO2     (35-45)  mmHg


 


ABG pO2     ()  mmHg


 


ABG HCO3     (21-25)  mmol/L


 


ABG Total CO2     (19-24)  mmol/L


 


ABG O2 Saturation     (94-97)  %


 


ABG Hematocrit     (34.0-46.0)  %


 


ABG Ionized Calcium     (4.5-5.3)  mg/dL


 


ABG Glucose     (75-99)  mg/dL


 


Hemoglobin     (11.4-16.0)  gm/dL


 


Chloride    112 H  ()  mmol/L


 


Carbon Dioxide     (22-30)  mmol/L


 


BUN    30 H  (7-17)  mg/dL


 


Creatinine    1.33 H  (0.52-1.04)  mg/dL


 


Glucose    144 H  (74-99)  mg/dL


 


POC Glucose (mg/dL)  147 H    (75-99)  mg/dL


 


Calcium    8.0 L  (8.4-10.2)  mg/dL


 


Ionized Calcium Kristyn     (4.5-5.3)  mg/dL


 


AST     (14-36)  U/L


 


Total Protein    5.2 L  (6.3-8.2)  g/dL


 


Albumin    2.7 L  (3.5-5.0)  g/dL


 


Arterial Blood Glucose     (75-99)  mg/dL


 


Crossmatch     














  01/24/19 01/25/19 01/25/19 Range/Units





  22:06 01:20 02:06 


 


WBC     (3.8-10.6)  k/uL


 


RBC     (3.80-5.40)  m/uL


 


Hgb     (11.4-16.0)  gm/dL


 


Hct     (34.0-46.0)  %


 


Neutrophils #     (1.3-7.7)  k/uL


 


Lymphocytes #     (1.0-4.8)  k/uL


 


PT     (9.0-12.0)  sec


 


INR     (<1.2)  


 


ABG pH     (7.35-7.45)  


 


ABG pCO2     (35-45)  mmHg


 


ABG pO2     ()  mmHg


 


ABG HCO3     (21-25)  mmol/L


 


ABG Total CO2     (19-24)  mmol/L


 


ABG O2 Saturation     (94-97)  %


 


ABG Hematocrit     (34.0-46.0)  %


 


ABG Ionized Calcium     (4.5-5.3)  mg/dL


 


ABG Glucose     (75-99)  mg/dL


 


Hemoglobin     (11.4-16.0)  gm/dL


 


Chloride     ()  mmol/L


 


Carbon Dioxide     (22-30)  mmol/L


 


BUN     (7-17)  mg/dL


 


Creatinine     (0.52-1.04)  mg/dL


 


Glucose     (74-99)  mg/dL


 


POC Glucose (mg/dL)  123 H  115 H  116 H  (75-99)  mg/dL


 


Calcium     (8.4-10.2)  mg/dL


 


Ionized Calcium Kristyn     (4.5-5.3)  mg/dL


 


AST     (14-36)  U/L


 


Total Protein     (6.3-8.2)  g/dL


 


Albumin     (3.5-5.0)  g/dL


 


Arterial Blood Glucose     (75-99)  mg/dL


 


Crossmatch     














  01/25/19 01/25/19 01/25/19 Range/Units





  05:15 05:15 05:15 


 


WBC     (3.8-10.6)  k/uL


 


RBC  2.82 L    (3.80-5.40)  m/uL


 


Hgb  8.0 L    (11.4-16.0)  gm/dL


 


Hct  25.2 L    (34.0-46.0)  %


 


Neutrophils #     (1.3-7.7)  k/uL


 


Lymphocytes #  0.6 L    (1.0-4.8)  k/uL


 


PT     (9.0-12.0)  sec


 


INR     (<1.2)  


 


ABG pH     (7.35-7.45)  


 


ABG pCO2     (35-45)  mmHg


 


ABG pO2     ()  mmHg


 


ABG HCO3     (21-25)  mmol/L


 


ABG Total CO2     (19-24)  mmol/L


 


ABG O2 Saturation     (94-97)  %


 


ABG Hematocrit     (34.0-46.0)  %


 


ABG Ionized Calcium     (4.5-5.3)  mg/dL


 


ABG Glucose     (75-99)  mg/dL


 


Hemoglobin     (11.4-16.0)  gm/dL


 


Chloride   111 H   ()  mmol/L


 


Carbon Dioxide   20 L   (22-30)  mmol/L


 


BUN   31 H   (7-17)  mg/dL


 


Creatinine   1.25 H   (0.52-1.04)  mg/dL


 


Glucose   156 H   (74-99)  mg/dL


 


POC Glucose (mg/dL)    148 H  (75-99)  mg/dL


 


Calcium   7.9 L   (8.4-10.2)  mg/dL


 


Ionized Calcium Kristyn     (4.5-5.3)  mg/dL


 


AST     (14-36)  U/L


 


Total Protein   5.1 L   (6.3-8.2)  g/dL


 


Albumin   2.5 L   (3.5-5.0)  g/dL


 


Arterial Blood Glucose     (75-99)  mg/dL


 


Crossmatch     














  01/25/19 01/25/19 01/25/19 Range/Units





  07:06 08:23 10:24 


 


WBC     (3.8-10.6)  k/uL


 


RBC     (3.80-5.40)  m/uL


 


Hgb     (11.4-16.0)  gm/dL


 


Hct     (34.0-46.0)  %


 


Neutrophils #     (1.3-7.7)  k/uL


 


Lymphocytes #     (1.0-4.8)  k/uL


 


PT     (9.0-12.0)  sec


 


INR     (<1.2)  


 


ABG pH     (7.35-7.45)  


 


ABG pCO2     (35-45)  mmHg


 


ABG pO2     ()  mmHg


 


ABG HCO3     (21-25)  mmol/L


 


ABG Total CO2     (19-24)  mmol/L


 


ABG O2 Saturation     (94-97)  %


 


ABG Hematocrit     (34.0-46.0)  %


 


ABG Ionized Calcium     (4.5-5.3)  mg/dL


 


ABG Glucose     (75-99)  mg/dL


 


Hemoglobin     (11.4-16.0)  gm/dL


 


Chloride     ()  mmol/L


 


Carbon Dioxide     (22-30)  mmol/L


 


BUN     (7-17)  mg/dL


 


Creatinine     (0.52-1.04)  mg/dL


 


Glucose     (74-99)  mg/dL


 


POC Glucose (mg/dL)  151 H  152 H  143 H  (75-99)  mg/dL


 


Calcium     (8.4-10.2)  mg/dL


 


Ionized Calcium Kristyn     (4.5-5.3)  mg/dL


 


AST     (14-36)  U/L


 


Total Protein     (6.3-8.2)  g/dL


 


Albumin     (3.5-5.0)  g/dL


 


Arterial Blood Glucose     (75-99)  mg/dL


 


Crossmatch     














  01/25/19 01/25/19 Range/Units





  11:24 12:34 


 


WBC    (3.8-10.6)  k/uL


 


RBC    (3.80-5.40)  m/uL


 


Hgb    (11.4-16.0)  gm/dL


 


Hct    (34.0-46.0)  %


 


Neutrophils #    (1.3-7.7)  k/uL


 


Lymphocytes #    (1.0-4.8)  k/uL


 


PT    (9.0-12.0)  sec


 


INR    (<1.2)  


 


ABG pH    (7.35-7.45)  


 


ABG pCO2    (35-45)  mmHg


 


ABG pO2    ()  mmHg


 


ABG HCO3    (21-25)  mmol/L


 


ABG Total CO2    (19-24)  mmol/L


 


ABG O2 Saturation    (94-97)  %


 


ABG Hematocrit    (34.0-46.0)  %


 


ABG Ionized Calcium    (4.5-5.3)  mg/dL


 


ABG Glucose    (75-99)  mg/dL


 


Hemoglobin    (11.4-16.0)  gm/dL


 


Chloride    ()  mmol/L


 


Carbon Dioxide    (22-30)  mmol/L


 


BUN    (7-17)  mg/dL


 


Creatinine    (0.52-1.04)  mg/dL


 


Glucose    (74-99)  mg/dL


 


POC Glucose (mg/dL)  130 H  122 H  (75-99)  mg/dL


 


Calcium    (8.4-10.2)  mg/dL


 


Ionized Calcium Kristyn    (4.5-5.3)  mg/dL


 


AST    (14-36)  U/L


 


Total Protein    (6.3-8.2)  g/dL


 


Albumin    (3.5-5.0)  g/dL


 


Arterial Blood Glucose    (75-99)  mg/dL


 


Crossmatch    














Assessment and Plan


Assessment: 





Impression:





1 coronary artery disease, status post CABG, postoperative day #1.





2 status post three-vessel coronary artery bypass surgery.





3 ischemic cardiomyopathy and LV dysfunction





4 history of chronic systolic heart failure.





5 history of COPD





6 multiple comorbidities: history of diabetes, hyperlipidemia, iron deficiency 

anemia, peripheral vessel occlusive disease involving lower extremities, 

history of previous MI, and history of tobacco dependence presently in 

remission.





Recommendation: Continue all cardiac meds including beta blockers Plavix statin 

and aspirin.  Patient was placed on oral Cardizem and IV Cardizem was 

discontinued.  Continue to titrate O2 as tolerated.  Durand will be discontinued, 

continue to encourage incentive spirometry, mediastinal chest tube will be 

discontinued today, continue bronchodilators, early ambulation, continue 

glucose management with insulin as per protocol, monitor x-rays on a daily basis

, will continue to follow.  Patient will likely remain in the ICU today, and 

will follow closely.  Still requiring relatively high FiO2, but it will be 

titrated down as tolerated.


Time with Patient: Less than 30

## 2019-01-25 NOTE — P.PN
Subjective


Progress Note Date: 01/25/19


Principal diagnosis: 





Coronary artery disease, chronic systolic and diastolic heart failure with 

preoperative ejection fraction 20-25%.  Previous medical history of non-STEMI 

earlier this month as well as in 2006 with stent placement at that time, 

hyperlipidemia, peripheral artery disease with stent placement to the left 

lower extremity in 2016, chronic kidney disease with baseline creatinine 1.3-1.5

, recent vein surgery to bilateral lower extremity is, non-insulin-dependent 

diabetes mellitus with preoperative hemoglobin A1c 7.1%, shingles, obesity, 

moderate COPD with preoperative FEV1 51% of predicted, previous tobacco 

dependence, and family history of early coronary artery disease with mother 

dying before the age of 60 years old during coronary artery bypass graft 

surgery.  Preoperative nasal swab positive for MSSA.  Preoperative normocytic, 

normochromic anemia.





POD #1 off-pump coronary artery bypass graft surgery 3 with the left internal 

mammary artery artery graft to the left anterior descending artery, right 

radial artery graft to the obtuse marginal and the left radial arterial graft 

to the posterior descending artery, bilateral endovascular radial artery 

harvest.  Intraoperative transesophageal echocardiogram by anesthesia.





Postoperative acute blood loss anemia, expected outcome.





Patient's currently sitting up in bed in no acute distress.  States pain is 

controlled on current medication regimen.  Was successfully extubated last 

night at 19:22.  Was maintained on IV Cardizem, IV nitro, IV dopamine overnight

, levo turned off around midnight.  Remains hemodynamically stable.  No new 

complaints.





Objective





- Vital Signs


Vital signs: 


 Vital Signs











Temp  97.4 F L  01/25/19 08:00


 


Pulse  81   01/25/19 08:00


 


Resp  12   01/25/19 08:00


 


BP  96/45   01/25/19 05:30


 


Pulse Ox  97   01/25/19 08:00








 Intake & Output











 01/24/19 01/25/19 01/25/19





 18:59 06:59 18:59


 


Intake Total 483.426 7943.651 98.452


 


Output Total 1340 1292 250


 


Balance -488.657 -53.349 -151.548


 


Weight   97 kg


 


Intake:   


 


   1054.6 69.6


 


    .9NS Pressure Bag 36 42 3


 


    0.9NS Cardiac Output 80 80 


 


    ACETAMINOPHEN IV (For NPO  200 





    ) 1,000 mg In Empty Bag 1   





    bag @ 400 mls/hr IVPB   





    Q6HR [facility] Rx#:[id number]   


 


    DOPamine DRIP 800 mg In  111.1 10.1





    Dextrose/Water 1 500ml.   





    bag @ 3 MCG/KG/MIN 10.22   





    mls/hr IV .Q24H [facility] Rx#:   





    [id number]   


 


    Diltiazem 125 mg In  20 





    Sodium Chloride 0.9% 100   





    ml @ 5 MG/HR 5 mls/hr IV   





    .Q24H [facility] Rx#:[id number]   


 


    Diltiazem 50 mg In Sodium  35 5





    Chloride 0.9% 40 ml @   





    Per Protocol IV ONCE ONE   





    Rx#:[id number]   


 


    Lactated Ringers 1,000 ml  550 50





    @ 20 mls/hr IV .Q24H [facility]   





    Rx#:[id number]   


 


    Nitroglycerin-D5w Pmx 50  16.5 1.5





    mg In Dextrose/Water 1   





    250ml.bag @ Per Protocol   





    IV ONCE ONE Rx#:[id number]   


 


  Intake, IV Titration 422.343 154.051 28.852





  Amount   


 


    DOPamine DRIP 800 mg In 30.3 10.1 





    Dextrose/Water 1 500ml.   





    bag @ 3 MCG/KG/MIN 10.22   





    mls/hr IV .Q24H [facility] Rx#:   





    [id number]   


 


    Diltiazem 125 mg In 15 48.083 





    Sodium Chloride 0.9% 100   





    ml @ 5 MG/HR 5 mls/hr IV   





    .Q24H [facility] Rx#:[id number]   


 


    Insulin Regular 100 unit 6.604 25.508 5.252





    In Sodium Chloride 0.9%   





    100 ml @ Per Protocol IV   





    .Q0M [facility] Rx#:[id number]   


 


    Lactated Ringers 1,000 ml 150 50 





    @ 20 mls/hr IV .Q24H [facility]   





    Rx#:[id number]   


 


    Magnesium Sulfate-D5w Pmx 100  





    1 gm In Dextrose/Water 1   





    100ml.bag @ 100 mls/hr   





    IVPB Q1H [facility] Rx#:   





    [id number]   


 


    Nitroglycerin-D5w Pmx 50 4.5 1.5 23.6





    mg In Dextrose/Water 1   





    250ml.bag @ 5 MCG/MIN 1.5   





    mls/hr IV .Q24H [facility] Rx#:   





    [id number]   


 


    Norepinephrine 4 mg In 15.939 18.86 





    Sodium Chloride 0.9% 250   





    ml @ Titrate IV .Q0M [facility]   





    Rx#:[id number]   


 


    Potassium Chloride 10 meq 100  





    In Water For Injection 1   





    100ml.bag @ 100 mls/hr   





    IVPB Q1H [facility] Rx#:   





    [id number]   


 


  Oral  30 


 


  Blood Product 310  


 


    Rc Cpda-1  Unit 310  





    [id number]   


 


Output:   


 


  Chest Tube Drainage 285 710 200


 


    Left Pleural Chest Tube 150 330 90


 


    Mediastinal Chext Tube X 105 190 50





    2   


 


    Right Pleural Chest Tube 30 190 60


 


  Drainage  40 


 


    Left Arm  20 


 


    Right Arm  20 


 


  Urine 255 542 50


 


  Estimated Blood Loss 800  


 


Other:   


 


  Voiding Method Indwelling Catheter Indwelling Catheter 








 ABP, PAP, CO, CI - Last Documented











Arterial Blood Pressure        107/50


 


Pulmonary Artery Pressure      39/20


 


Cardiac Output                 6.0


 


Cardiac Index                  2.9

















- Constitutional


General appearance: Present: cooperative, no acute distress, obese





- Respiratory


Details: 





Lungs sounds diminished bilaterally.  Respirations even, nonlabored.  Currently 

on 7 L high flow nasal cannula with oxygen saturation 95%.  Able to achieve 500 

mL on her incentive spirometry.  Weak cough.  Mediastinal chest tube to 

continuous wall suction, 100 mL serosanguineous drainage overnight, 450 mL 

since surgery.  Right pleural chest tube to continuous wall suction, 160 mL 

serosanguineous drainage overnight, 300 mL since surgery.  Left pleural chest 

tube to continuous wall suction, 180 mL serosanguineous drainage overnight, 550 

mL since surgery.  No air leaks present.





- Cardiovascular


Details: 





S1, S2 present, positive rub.  Regular rate and rhythm, sinus rhythm on 

telemetry.  Sternum stable.  Palpable peripheral pulses bilaterally.  Trace 

bilateral lower extremity edema present.  No calf pain or tenderness noted.  

Right internal jugular Crossnore/Cordis, right femoral arterial line present.  Last 

CO/CI 10.1/5.0 on 3 mics of dopamine and 5 mg of Cardizem.  Heart hugger, 

antiembolism stockings, SCDs present.





- Gastrointestinal


Gastrointestinal Comment(s): 





Abdomen soft, nontender, nondistended.  Hypoactive bowel sounds present 4 

quadrants.  Tolerating sips of clear liquid although positive nausea.  Positive 

flatus.





- Genitourinary


Genitourinary Comment(s): 





Lerma present draining clear, yellow urine.  Output overnight 30-90 mL per hour

, 337 mL in the last 8 hours.





- Integumentary


Integumentary Comment(s): 





Skin is warm and dry with evidence of good perfusion.  Anterior chest incision 

well approximated and covered with dry intact dressing.  Bilateral radial 

artery harvest sites well approximate, LANE drains present with minimal drainage.

  Patient has feeling in all of her fingers and is able to move her hands 

without difficulty.





- Neurologic


Neurologic: Present: CNII-XII intact





- Musculoskeletal


Musculoskeletal: Present: strength equal bilaterally





- Psychiatric


Psychiatric: Present: A&O x's 3, appropriate affect, intact judgment & insight





- Allied health notes


Allied health notes reviewed: nursing





- Labs


CBC & Chem 7: 


 01/25/19 05:15





 01/25/19 05:15


Labs: 


 Abnormal Lab Results - Last 24 Hours (Table)











  01/17/19 01/24/19 01/24/19 Range/Units





  08:25 08:57 08:57 


 


WBC     (3.8-10.6)  k/uL


 


RBC     (3.80-5.40)  m/uL


 


Hgb     (11.4-16.0)  gm/dL


 


Hct     (34.0-46.0)  %


 


Neutrophils #     (1.3-7.7)  k/uL


 


Lymphocytes #     (1.0-4.8)  k/uL


 


PT     (9.0-12.0)  sec


 


INR     (<1.2)  


 


ABG pH     (7.35-7.45)  


 


ABG pCO2   34 L   (35-45)  mmHg


 


ABG pO2   >420 H  160 H  ()  mmHg


 


ABG HCO3     (21-25)  mmol/L


 


ABG Total CO2     (19-24)  mmol/L


 


ABG O2 Saturation   100.0 H  99.7 H  (94-97)  %


 


ABG Hematocrit   24 L  23 L  (34.0-46.0)  %


 


ABG Ionized Calcium     (4.5-5.3)  mg/dL


 


ABG Glucose   145 H  143 H  (75-99)  mg/dL


 


Hemoglobin   7.7 L  7.5 L  (11.4-16.0)  gm/dL


 


Chloride     ()  mmol/L


 


Carbon Dioxide     (22-30)  mmol/L


 


BUN     (7-17)  mg/dL


 


Creatinine     (0.52-1.04)  mg/dL


 


Glucose     (74-99)  mg/dL


 


POC Glucose (mg/dL)     (75-99)  mg/dL


 


Calcium     (8.4-10.2)  mg/dL


 


Ionized Calcium Kristyn     (4.5-5.3)  mg/dL


 


AST     (14-36)  U/L


 


Total Protein     (6.3-8.2)  g/dL


 


Albumin     (3.5-5.0)  g/dL


 


Arterial Blood Glucose   145 H  143 H  (75-99)  mg/dL


 


Crossmatch  See Detail    














  01/24/19 01/24/19 01/24/19 Range/Units





  12:42 13:09 13:45 


 


WBC     (3.8-10.6)  k/uL


 


RBC     (3.80-5.40)  m/uL


 


Hgb     (11.4-16.0)  gm/dL


 


Hct     (34.0-46.0)  %


 


Neutrophils #     (1.3-7.7)  k/uL


 


Lymphocytes #     (1.0-4.8)  k/uL


 


PT     (9.0-12.0)  sec


 


INR     (<1.2)  


 


ABG pH    7.33 L  (7.35-7.45)  


 


ABG pCO2     (35-45)  mmHg


 


ABG pO2  173 H  226 H  66 L  ()  mmHg


 


ABG HCO3     (21-25)  mmol/L


 


ABG Total CO2  25 H   25 H  (19-24)  mmol/L


 


ABG O2 Saturation  99.6 H  100.0 H  92.3 L  (94-97)  %


 


ABG Hematocrit  23 L  21 L  25 L  (34.0-46.0)  %


 


ABG Ionized Calcium  4.4 L  4.3 L  4.3 L  (4.5-5.3)  mg/dL


 


ABG Glucose  166 H  141 H  153 H  (75-99)  mg/dL


 


Hemoglobin  7.4 L  6.7 L*  8.0 L  (11.4-16.0)  gm/dL


 


Chloride     ()  mmol/L


 


Carbon Dioxide     (22-30)  mmol/L


 


BUN     (7-17)  mg/dL


 


Creatinine     (0.52-1.04)  mg/dL


 


Glucose     (74-99)  mg/dL


 


POC Glucose (mg/dL)     (75-99)  mg/dL


 


Calcium     (8.4-10.2)  mg/dL


 


Ionized Calcium Kristyn     (4.5-5.3)  mg/dL


 


AST     (14-36)  U/L


 


Total Protein     (6.3-8.2)  g/dL


 


Albumin     (3.5-5.0)  g/dL


 


Arterial Blood Glucose  166 H  141 H  153 H  (75-99)  mg/dL


 


Crossmatch     














  01/24/19 01/24/19 01/24/19 Range/Units





  14:13 15:30 15:30 


 


WBC    12.6 H  (3.8-10.6)  k/uL


 


RBC    2.63 L  (3.80-5.40)  m/uL


 


Hgb    7.7 L D  (11.4-16.0)  gm/dL


 


Hct    23.5 L  (34.0-46.0)  %


 


Neutrophils #    8.9 H  (1.3-7.7)  k/uL


 


Lymphocytes #     (1.0-4.8)  k/uL


 


PT     (9.0-12.0)  sec


 


INR     (<1.2)  


 


ABG pH     (7.35-7.45)  


 


ABG pCO2     (35-45)  mmHg


 


ABG pO2  231 H    ()  mmHg


 


ABG HCO3     (21-25)  mmol/L


 


ABG Total CO2  25 H    (19-24)  mmol/L


 


ABG O2 Saturation  100.0 H    (94-97)  %


 


ABG Hematocrit  24 L    (34.0-46.0)  %


 


ABG Ionized Calcium  4.2 L    (4.5-5.3)  mg/dL


 


ABG Glucose  157 H    (75-99)  mg/dL


 


Hemoglobin  7.7 L    (11.4-16.0)  gm/dL


 


Chloride     ()  mmol/L


 


Carbon Dioxide     (22-30)  mmol/L


 


BUN     (7-17)  mg/dL


 


Creatinine     (0.52-1.04)  mg/dL


 


Glucose     (74-99)  mg/dL


 


POC Glucose (mg/dL)   156 H   (75-99)  mg/dL


 


Calcium     (8.4-10.2)  mg/dL


 


Ionized Calcium Kristyn     (4.5-5.3)  mg/dL


 


AST     (14-36)  U/L


 


Total Protein     (6.3-8.2)  g/dL


 


Albumin     (3.5-5.0)  g/dL


 


Arterial Blood Glucose  157 H    (75-99)  mg/dL


 


Crossmatch     














  01/24/19 01/24/19 01/24/19 Range/Units





  15:30 15:30 15:56 


 


WBC     (3.8-10.6)  k/uL


 


RBC     (3.80-5.40)  m/uL


 


Hgb     (11.4-16.0)  gm/dL


 


Hct     (34.0-46.0)  %


 


Neutrophils #     (1.3-7.7)  k/uL


 


Lymphocytes #     (1.0-4.8)  k/uL


 


PT   12.1 H   (9.0-12.0)  sec


 


INR   1.2 H   (<1.2)  


 


ABG pH    7.32 L  (7.35-7.45)  


 


ABG pCO2     (35-45)  mmHg


 


ABG pO2    300 H  ()  mmHg


 


ABG HCO3     (21-25)  mmol/L


 


ABG Total CO2     (19-24)  mmol/L


 


ABG O2 Saturation    100.0 H  (94-97)  %


 


ABG Hematocrit     (34.0-46.0)  %


 


ABG Ionized Calcium     (4.5-5.3)  mg/dL


 


ABG Glucose     (75-99)  mg/dL


 


Hemoglobin     (11.4-16.0)  gm/dL


 


Chloride  115 H    ()  mmol/L


 


Carbon Dioxide  21 L    (22-30)  mmol/L


 


BUN  28 H    (7-17)  mg/dL


 


Creatinine  1.25 H    (0.52-1.04)  mg/dL


 


Glucose  150 H    (74-99)  mg/dL


 


POC Glucose (mg/dL)     (75-99)  mg/dL


 


Calcium  7.5 L    (8.4-10.2)  mg/dL


 


Ionized Calcium Kristyn  4.4 L    (4.5-5.3)  mg/dL


 


AST  13 L    (14-36)  U/L


 


Total Protein  4.7 L    (6.3-8.2)  g/dL


 


Albumin  2.3 L    (3.5-5.0)  g/dL


 


Arterial Blood Glucose     (75-99)  mg/dL


 


Crossmatch     














  01/24/19 01/24/19 01/24/19 Range/Units





  16:08 17:29 17:35 


 


WBC   11.7 H   (3.8-10.6)  k/uL


 


RBC   2.83 L   (3.80-5.40)  m/uL


 


Hgb   8.2 L   (11.4-16.0)  gm/dL


 


Hct   25.1 L   (34.0-46.0)  %


 


Neutrophils #   9.8 H   (1.3-7.7)  k/uL


 


Lymphocytes #   0.9 L   (1.0-4.8)  k/uL


 


PT     (9.0-12.0)  sec


 


INR     (<1.2)  


 


ABG pH     (7.35-7.45)  


 


ABG pCO2     (35-45)  mmHg


 


ABG pO2     ()  mmHg


 


ABG HCO3     (21-25)  mmol/L


 


ABG Total CO2     (19-24)  mmol/L


 


ABG O2 Saturation     (94-97)  %


 


ABG Hematocrit     (34.0-46.0)  %


 


ABG Ionized Calcium     (4.5-5.3)  mg/dL


 


ABG Glucose     (75-99)  mg/dL


 


Hemoglobin     (11.4-16.0)  gm/dL


 


Chloride     ()  mmol/L


 


Carbon Dioxide     (22-30)  mmol/L


 


BUN     (7-17)  mg/dL


 


Creatinine     (0.52-1.04)  mg/dL


 


Glucose     (74-99)  mg/dL


 


POC Glucose (mg/dL)  166 H   179 H  (75-99)  mg/dL


 


Calcium     (8.4-10.2)  mg/dL


 


Ionized Calcium Kristyn     (4.5-5.3)  mg/dL


 


AST     (14-36)  U/L


 


Total Protein     (6.3-8.2)  g/dL


 


Albumin     (3.5-5.0)  g/dL


 


Arterial Blood Glucose     (75-99)  mg/dL


 


Crossmatch     














  01/24/19 01/24/19 01/24/19 Range/Units





  18:24 18:52 18:57 


 


WBC     (3.8-10.6)  k/uL


 


RBC     (3.80-5.40)  m/uL


 


Hgb     (11.4-16.0)  gm/dL


 


Hct     (34.0-46.0)  %


 


Neutrophils #     (1.3-7.7)  k/uL


 


Lymphocytes #     (1.0-4.8)  k/uL


 


PT     (9.0-12.0)  sec


 


INR     (<1.2)  


 


ABG pH    7.32 L  (7.35-7.45)  


 


ABG pCO2     (35-45)  mmHg


 


ABG pO2    71 L  ()  mmHg


 


ABG HCO3    20 L  (21-25)  mmol/L


 


ABG Total CO2     (19-24)  mmol/L


 


ABG O2 Saturation     (94-97)  %


 


ABG Hematocrit     (34.0-46.0)  %


 


ABG Ionized Calcium     (4.5-5.3)  mg/dL


 


ABG Glucose     (75-99)  mg/dL


 


Hemoglobin     (11.4-16.0)  gm/dL


 


Chloride     ()  mmol/L


 


Carbon Dioxide     (22-30)  mmol/L


 


BUN     (7-17)  mg/dL


 


Creatinine     (0.52-1.04)  mg/dL


 


Glucose     (74-99)  mg/dL


 


POC Glucose (mg/dL)  179 H  162 H   (75-99)  mg/dL


 


Calcium     (8.4-10.2)  mg/dL


 


Ionized Calcium Kristyn     (4.5-5.3)  mg/dL


 


AST     (14-36)  U/L


 


Total Protein     (6.3-8.2)  g/dL


 


Albumin     (3.5-5.0)  g/dL


 


Arterial Blood Glucose     (75-99)  mg/dL


 


Crossmatch     














  01/24/19 01/24/19 01/24/19 Range/Units





  20:56 20:58 20:58 


 


WBC   11.1 H   (3.8-10.6)  k/uL


 


RBC   2.88 L   (3.80-5.40)  m/uL


 


Hgb   8.1 L   (11.4-16.0)  gm/dL


 


Hct   25.4 L   (34.0-46.0)  %


 


Neutrophils #   9.4 H   (1.3-7.7)  k/uL


 


Lymphocytes #   0.6 L   (1.0-4.8)  k/uL


 


PT     (9.0-12.0)  sec


 


INR     (<1.2)  


 


ABG pH     (7.35-7.45)  


 


ABG pCO2     (35-45)  mmHg


 


ABG pO2     ()  mmHg


 


ABG HCO3     (21-25)  mmol/L


 


ABG Total CO2     (19-24)  mmol/L


 


ABG O2 Saturation     (94-97)  %


 


ABG Hematocrit     (34.0-46.0)  %


 


ABG Ionized Calcium     (4.5-5.3)  mg/dL


 


ABG Glucose     (75-99)  mg/dL


 


Hemoglobin     (11.4-16.0)  gm/dL


 


Chloride    112 H  ()  mmol/L


 


Carbon Dioxide     (22-30)  mmol/L


 


BUN    30 H  (7-17)  mg/dL


 


Creatinine    1.33 H  (0.52-1.04)  mg/dL


 


Glucose    144 H  (74-99)  mg/dL


 


POC Glucose (mg/dL)  147 H    (75-99)  mg/dL


 


Calcium    8.0 L  (8.4-10.2)  mg/dL


 


Ionized Calcium Kristyn     (4.5-5.3)  mg/dL


 


AST     (14-36)  U/L


 


Total Protein    5.2 L  (6.3-8.2)  g/dL


 


Albumin    2.7 L  (3.5-5.0)  g/dL


 


Arterial Blood Glucose     (75-99)  mg/dL


 


Crossmatch     














  01/24/19 01/25/19 01/25/19 Range/Units





  22:06 01:20 02:06 


 


WBC     (3.8-10.6)  k/uL


 


RBC     (3.80-5.40)  m/uL


 


Hgb     (11.4-16.0)  gm/dL


 


Hct     (34.0-46.0)  %


 


Neutrophils #     (1.3-7.7)  k/uL


 


Lymphocytes #     (1.0-4.8)  k/uL


 


PT     (9.0-12.0)  sec


 


INR     (<1.2)  


 


ABG pH     (7.35-7.45)  


 


ABG pCO2     (35-45)  mmHg


 


ABG pO2     ()  mmHg


 


ABG HCO3     (21-25)  mmol/L


 


ABG Total CO2     (19-24)  mmol/L


 


ABG O2 Saturation     (94-97)  %


 


ABG Hematocrit     (34.0-46.0)  %


 


ABG Ionized Calcium     (4.5-5.3)  mg/dL


 


ABG Glucose     (75-99)  mg/dL


 


Hemoglobin     (11.4-16.0)  gm/dL


 


Chloride     ()  mmol/L


 


Carbon Dioxide     (22-30)  mmol/L


 


BUN     (7-17)  mg/dL


 


Creatinine     (0.52-1.04)  mg/dL


 


Glucose     (74-99)  mg/dL


 


POC Glucose (mg/dL)  123 H  115 H  116 H  (75-99)  mg/dL


 


Calcium     (8.4-10.2)  mg/dL


 


Ionized Calcium Kristyn     (4.5-5.3)  mg/dL


 


AST     (14-36)  U/L


 


Total Protein     (6.3-8.2)  g/dL


 


Albumin     (3.5-5.0)  g/dL


 


Arterial Blood Glucose     (75-99)  mg/dL


 


Crossmatch     














  01/25/19 01/25/19 01/25/19 Range/Units





  05:15 05:15 05:15 


 


WBC     (3.8-10.6)  k/uL


 


RBC  2.82 L    (3.80-5.40)  m/uL


 


Hgb  8.0 L    (11.4-16.0)  gm/dL


 


Hct  25.2 L    (34.0-46.0)  %


 


Neutrophils #     (1.3-7.7)  k/uL


 


Lymphocytes #  0.6 L    (1.0-4.8)  k/uL


 


PT     (9.0-12.0)  sec


 


INR     (<1.2)  


 


ABG pH     (7.35-7.45)  


 


ABG pCO2     (35-45)  mmHg


 


ABG pO2     ()  mmHg


 


ABG HCO3     (21-25)  mmol/L


 


ABG Total CO2     (19-24)  mmol/L


 


ABG O2 Saturation     (94-97)  %


 


ABG Hematocrit     (34.0-46.0)  %


 


ABG Ionized Calcium     (4.5-5.3)  mg/dL


 


ABG Glucose     (75-99)  mg/dL


 


Hemoglobin     (11.4-16.0)  gm/dL


 


Chloride   111 H   ()  mmol/L


 


Carbon Dioxide   20 L   (22-30)  mmol/L


 


BUN   31 H   (7-17)  mg/dL


 


Creatinine   1.25 H   (0.52-1.04)  mg/dL


 


Glucose   156 H   (74-99)  mg/dL


 


POC Glucose (mg/dL)    148 H  (75-99)  mg/dL


 


Calcium   7.9 L   (8.4-10.2)  mg/dL


 


Ionized Calcium Kristyn     (4.5-5.3)  mg/dL


 


AST     (14-36)  U/L


 


Total Protein   5.1 L   (6.3-8.2)  g/dL


 


Albumin   2.5 L   (3.5-5.0)  g/dL


 


Arterial Blood Glucose     (75-99)  mg/dL


 


Crossmatch     














  01/25/19 01/25/19 Range/Units





  07:06 08:23 


 


WBC    (3.8-10.6)  k/uL


 


RBC    (3.80-5.40)  m/uL


 


Hgb    (11.4-16.0)  gm/dL


 


Hct    (34.0-46.0)  %


 


Neutrophils #    (1.3-7.7)  k/uL


 


Lymphocytes #    (1.0-4.8)  k/uL


 


PT    (9.0-12.0)  sec


 


INR    (<1.2)  


 


ABG pH    (7.35-7.45)  


 


ABG pCO2    (35-45)  mmHg


 


ABG pO2    ()  mmHg


 


ABG HCO3    (21-25)  mmol/L


 


ABG Total CO2    (19-24)  mmol/L


 


ABG O2 Saturation    (94-97)  %


 


ABG Hematocrit    (34.0-46.0)  %


 


ABG Ionized Calcium    (4.5-5.3)  mg/dL


 


ABG Glucose    (75-99)  mg/dL


 


Hemoglobin    (11.4-16.0)  gm/dL


 


Chloride    ()  mmol/L


 


Carbon Dioxide    (22-30)  mmol/L


 


BUN    (7-17)  mg/dL


 


Creatinine    (0.52-1.04)  mg/dL


 


Glucose    (74-99)  mg/dL


 


POC Glucose (mg/dL)  151 H  152 H  (75-99)  mg/dL


 


Calcium    (8.4-10.2)  mg/dL


 


Ionized Calcium Kristyn    (4.5-5.3)  mg/dL


 


AST    (14-36)  U/L


 


Total Protein    (6.3-8.2)  g/dL


 


Albumin    (3.5-5.0)  g/dL


 


Arterial Blood Glucose    (75-99)  mg/dL


 


Crossmatch    














- Imaging and Cardiology


Chest x-ray: report reviewed, image reviewed





Assessment and Plan


(1) Coronary artery disease


Current Visit: Yes   Status: Chronic   Code(s): I25.10 - ATHSCL HEART DISEASE 

OF NATIVE CORONARY ARTERY W/O ANG PCTRS   SNOMED Code(s): 83624604


   





(2) Ischemic cardiomyopathy


Current Visit: Yes   Status: Chronic   Code(s): I25.5 - ISCHEMIC CARDIOMYOPATHY

   SNOMED Code(s): 211414387


   





(3) Status post three vessel coronary artery bypass


Current Visit: Yes   Status: Acute   Code(s): Z95.1 - PRESENCE OF AORTOCORONARY 

BYPASS GRAFT   SNOMED Code(s): 885364871


   





(4) Systolic heart failure


Current Visit: Yes   Status: Chronic   Code(s): I50.20 - UNSPECIFIED SYSTOLIC (

CONGESTIVE) HEART FAILURE   SNOMED Code(s): 489604869


   





(5) COPD (chronic obstructive pulmonary disease)


Current Visit: Yes   Status: Chronic   Code(s): J44.9 - CHRONIC OBSTRUCTIVE 

PULMONARY DISEASE, UNSPECIFIED   SNOMED Code(s): 68123490


   





(6) Diabetes mellitus


Current Visit: Yes   Status: Chronic   Code(s): E11.9 - TYPE 2 DIABETES 

MELLITUS WITHOUT COMPLICATIONS   SNOMED Code(s): 08212810


   





(7) History of heart artery stent


Current Visit: Yes   Status: Chronic   Code(s): Z95.5 - PRESENCE OF CORONARY 

ANGIOPLASTY IMPLANT AND GRAFT   SNOMED Code(s): 773455097


   





(8) Hyperlipidemia


Current Visit: Yes   Status: Chronic   Code(s): E78.5 - HYPERLIPIDEMIA, 

UNSPECIFIED   SNOMED Code(s): 97930093


   





(9) Iron deficiency anemia


Current Visit: Yes   Status: Chronic   Code(s): D50.9 - IRON DEFICIENCY ANEMIA, 

UNSPECIFIED   SNOMED Code(s): 90332438


   





(10) Obesity (BMI 30.0-34.9)


Current Visit: Yes   Status: Chronic   Code(s): E66.9 - OBESITY, UNSPECIFIED   

SNOMED Code(s): 320002768138441


   





(11) Peripheral artery disease


Current Visit: Yes   Status: Chronic   Code(s): I73.9 - PERIPHERAL VASCULAR 

DISEASE, UNSPECIFIED   SNOMED Code(s): 605367343


   





(12) History of myocardial infarction, greater than 8 weeks ago


Current Visit: No   Status: Resolved   Code(s): I25.2 - OLD MYOCARDIAL 

INFARCTION   SNOMED Code(s): 4955210


   





(13) Tobacco dependence in remission


Current Visit: No   Status: Resolved   Code(s): F17.201 - NICOTINE DEPENDENCE, 

UNSPECIFIED, IN REMISSION   SNOMED Code(s): 652331092


   


Plan: 





1.  Continue aspirin, statin, Plavix, beta blocker therapy.  Will increase beta 

blocker therapy as tolerated.


2.  Discontinue dopamine, IV nitro.


3.  Start oral Cardizem for radial artery spasm.  Will discontinue IV Cardizem 

after second dose oral administered.


4.  Wean O2 as tolerated.  Encourage incentive spirometry is 10 times every 

hour while awake.


5.  Discontinue Crossnore.  Connected cord is to continue CVP monitoring.  

Discontinue femoral arterial line.


6.  Once femoral arterial line discontinued, increase activity as tolerated.  PT

/OT/cardiac rehab following.


7.  Discontinue mediastinal chest tube.  Continue right and left pleural chest 

tubes to continuous wall suction for another 24 hours.


8.  No diuresis today.  Likely will restart Aldactone tomorrow.


9.  Pain control with current medication regimen.  No Toradol secondary to 

chronic renal insufficiency.


10.  Bronchodilators per pulmonology.


11.  Insulin management per primary care service.


12.  GI prophylaxis with Protonix, DVT prophylaxis with subcu heparin, SCDs.


13.  Will monitor daily labs and x-rays.  Electro it replacement per protocol.


14.  Dietitian to see patient for education regarding weight loss, reduced fat, 

reduced sugar diet.


15.  More recommendations to follow.


Time with Patient: Greater than 30

## 2019-01-25 NOTE — CONS
CONSULTATION



DATE OF CONSULTATION:

01/25/2019



REASON FOR CONSULTATION:

Medical management requested by Dr. Hoskins.



CONSULTATION:

This is a pleasant 68-year-old patient of Dr. Stephens who has undergone a triple-vessel

bypass.  Patient was successfully extubated.  Earlier today patient was on IV Cardizem

and IV dopamine.  Both the Cardizem and dopamine have been discontinued.  Patient is

also on IV insulin, nasal cannula at 3 L.  Chest tubes in place. Sitting up. Patient

was discharged from the hospital on 1/20/2019; at that time  had presented with

congestive heart failure exacerbation with an EF of 20% to 25%, and also had a non-Q-

wave MI.  Patient's chronic stable medical conditions include peripheral artery

disease, urinary incontinence, diabetes mellitus, type 2, chronic kidney disease, stage

III.



REVIEW OF SYSTEMS:

CONSTITUTIONAL: Tired.

HEENT None.

RESPIRATORY: As above.

CARDIOVASCULAR: As above.

GASTROINTESTINAL: None.

GENITOURINARY: Urinary incontinence.

MUSCULOSKELETAL: None.

DERMATOLOGICAL:  None.

HEMATOLOGIC: None.

LYMPHATICS: None.

PSYCHIATRY: None.

NEUROLOGICAL: None.



PAST MEDICAL HISTORY:

1. Coronary artery disease with stent.

2. Diabetes.

3. Hyperlipidemia.

4. Peripheral artery disease.

5. Congestive heart failure, EF 20% to 25%.

6. Peripheral artery disease.



PAST SURGICAL HISTORY:

1. Cardiac cath with stent.

2. Stent to the lower extremity.



SOCIAL HISTORY:

. Smoked a pack a day for 40 years; stopped in 2006. Alcohol occasionally.



FAMILY HISTORY:

Coronary artery disease.



HOME MEDICATIONS:

1. Metformin 1000 mg b.i.d.

2. Aldactone 12.5 p.o. daily.

3. Oxybutynin ER 10 mg p.o. daily.

4. Nitrostat 0.4 sublingually q.5 p.r.n.

5. Lopressor 50 mg b.i.d.

6. Zestril 5 mg p.o. daily.

7. Amaryl 2 mg p.o. daily.

8. Lipitor 40 mg p.o. daily.

9. Aspirin 325 p.o. daily.



ALLERGIES:

TETRACYCLINE.



PHYSICAL EXAMINATION:

VITAL SIGNS: Temperature 97.5, pulse 72, respiration 20, blood pressure 88/54, pulse ox

98% on 5 L.

GENERAL APPEARANCE:  Well built; BMI 34.5.  Sitting up, tired-appearing.

EYES: Pupils equal. Conjunctivae pale.

HEENT: External appearance of nose and ears normal. Oral cavity normal.  Nasal cannula

in place.

NECK: JVD unable to assess.  Mass not palpable.

RESPIRATORY: Effort increased.

LUNGS:  Diminished breath sounds.

CARDIOVASCULAR: First and second sounds normal. No edema.

ABDOMEN: Soft, non-tender. Liver and spleen not palpable.

LYMPHATIC: No lymph node palpable in neck or axillae.

PSYCHIATRY: Alert and oriented x3. Mood and affect tired-appearing.

NEUROLOGICAL:  Pupils equal. No facial asymmetry. Moving all 4 limbs.



INVESTIGATIONS:

White count 8.9, hemoglobin 8.0. Preoperatively it was 9.2. Potassium 5.1 BUN 31,

creatinine 1.25.  Preoperatively it was 1.53.  Accu-Cheks are noted. Albumin 2.5.



Chest x-ray shows some pulmonary edema.



ASSESSMENT:

1. Status post triple-vessel coronary artery bypass.

2. Chronic congestive heart failure from systolic dysfunction; ejection fraction 20%

    to 25%; from underlying coronary artery disease.

3. Acute non-Q-wave myocardial infarction about 10 days ago.

4. Peripheral arterial disease.

5. Chronic urinary incontinence.

6. Diabetes mellitus, type 2, on oral hypoglycemic, currently on insulin drip.

7. Chronic kidney disease, stage III, from diabetic nephropathy and nephrosclerosis.

8. Acute postoperative blood loss anemia, expected from surgery.

9. Metabolic acidosis from renal failure.

10.Hypoalbuminemia as an acute phase reactant.



PLAN:

Patient did get IV albumin and is also on DuoNeb, Lipitor, Plavix, p.o. Cardizem, IV

insulin drip, Protonix.  Care was discussed with the patient.  Will follow along.  Keep

a close eye on patient's renal function.



Thank you, Dr. Hoskins.





MMODL / IJN: [id number] / Job#: [id number]

## 2019-01-26 LAB
ALBUMIN SERPL-MCNC: 2.4 G/DL (ref 3.5–5)
ALP SERPL-CCNC: 76 U/L (ref 38–126)
ALT SERPL-CCNC: 25 U/L (ref 9–52)
ANION GAP SERPL CALC-SCNC: 8 MMOL/L
AST SERPL-CCNC: 18 U/L (ref 14–36)
BASOPHILS # BLD AUTO: 0 K/UL (ref 0–0.2)
BASOPHILS NFR BLD AUTO: 0 %
BUN SERPL-SCNC: 34 MG/DL (ref 7–17)
CALCIUM SPEC-MCNC: 8.1 MG/DL (ref 8.4–10.2)
CHLORIDE SERPL-SCNC: 110 MMOL/L (ref 98–107)
CO2 SERPL-SCNC: 20 MMOL/L (ref 22–30)
EOSINOPHIL # BLD AUTO: 0.1 K/UL (ref 0–0.7)
EOSINOPHIL NFR BLD AUTO: 1 %
ERYTHROCYTE [DISTWIDTH] IN BLOOD BY AUTOMATED COUNT: 2.64 M/UL (ref 3.8–5.4)
ERYTHROCYTE [DISTWIDTH] IN BLOOD: 15.1 % (ref 11.5–15.5)
GLUCOSE BLD-MCNC: 106 MG/DL (ref 75–99)
GLUCOSE BLD-MCNC: 109 MG/DL (ref 75–99)
GLUCOSE BLD-MCNC: 110 MG/DL (ref 75–99)
GLUCOSE BLD-MCNC: 113 MG/DL (ref 75–99)
GLUCOSE BLD-MCNC: 116 MG/DL (ref 75–99)
GLUCOSE BLD-MCNC: 119 MG/DL (ref 75–99)
GLUCOSE BLD-MCNC: 123 MG/DL (ref 75–99)
GLUCOSE BLD-MCNC: 125 MG/DL (ref 75–99)
GLUCOSE BLD-MCNC: 130 MG/DL (ref 75–99)
GLUCOSE BLD-MCNC: 167 MG/DL (ref 75–99)
GLUCOSE BLD-MCNC: 197 MG/DL (ref 75–99)
GLUCOSE BLD-MCNC: 254 MG/DL (ref 75–99)
GLUCOSE BLD-MCNC: 255 MG/DL (ref 75–99)
GLUCOSE SERPL-MCNC: 122 MG/DL (ref 74–99)
HCT VFR BLD AUTO: 24 % (ref 34–46)
HGB BLD-MCNC: 7.6 GM/DL (ref 11.4–16)
LYMPHOCYTES # SPEC AUTO: 0.8 K/UL (ref 1–4.8)
LYMPHOCYTES NFR SPEC AUTO: 7 %
MAGNESIUM SPEC-SCNC: 2.3 MG/DL (ref 1.6–2.3)
MCH RBC QN AUTO: 28.8 PG (ref 25–35)
MCHC RBC AUTO-ENTMCNC: 31.7 G/DL (ref 31–37)
MCV RBC AUTO: 90.9 FL (ref 80–100)
MONOCYTES # BLD AUTO: 0.7 K/UL (ref 0–1)
MONOCYTES NFR BLD AUTO: 7 %
NEUTROPHILS # BLD AUTO: 9.3 K/UL (ref 1.3–7.7)
NEUTROPHILS NFR BLD AUTO: 83 %
PLATELET # BLD AUTO: 215 K/UL (ref 150–450)
POTASSIUM SERPL-SCNC: 4.4 MMOL/L (ref 3.5–5.1)
PROT SERPL-MCNC: 5 G/DL (ref 6.3–8.2)
SODIUM SERPL-SCNC: 138 MMOL/L (ref 137–145)
WBC # BLD AUTO: 11.2 K/UL (ref 3.8–10.6)

## 2019-01-26 RX ADMIN — METOPROLOL TARTRATE SCH MG: 25 TABLET, FILM COATED ORAL at 22:06

## 2019-01-26 RX ADMIN — IPRATROPIUM BROMIDE AND ALBUTEROL SULFATE SCH: .5; 3 SOLUTION RESPIRATORY (INHALATION) at 21:35

## 2019-01-26 RX ADMIN — SODIUM CHLORIDE, PRESERVATIVE FREE SCH ML: 5 INJECTION INTRAVENOUS at 08:22

## 2019-01-26 RX ADMIN — ONDANSETRON PRN MG: 2 INJECTION INTRAMUSCULAR; INTRAVENOUS at 06:32

## 2019-01-26 RX ADMIN — IPRATROPIUM BROMIDE AND ALBUTEROL SULFATE SCH: .5; 3 SOLUTION RESPIRATORY (INHALATION) at 21:36

## 2019-01-26 RX ADMIN — ASPIRIN 325 MG ORAL TABLET SCH MG: 325 PILL ORAL at 08:22

## 2019-01-26 RX ADMIN — MUPIROCIN SCH APPLIC: 20 OINTMENT TOPICAL at 22:42

## 2019-01-26 RX ADMIN — DILTIAZEM HYDROCHLORIDE SCH: 30 TABLET, FILM COATED ORAL at 00:27

## 2019-01-26 RX ADMIN — DILTIAZEM HYDROCHLORIDE SCH MG: 30 TABLET, FILM COATED ORAL at 04:59

## 2019-01-26 RX ADMIN — METOPROLOL TARTRATE SCH MG: 25 TABLET, FILM COATED ORAL at 08:24

## 2019-01-26 RX ADMIN — PANTOPRAZOLE SODIUM SCH MG: 40 TABLET, DELAYED RELEASE ORAL at 08:22

## 2019-01-26 RX ADMIN — INSULIN ASPART SCH UNIT: 100 INJECTION, SOLUTION INTRAVENOUS; SUBCUTANEOUS at 22:07

## 2019-01-26 RX ADMIN — HEPARIN SODIUM SCH UNIT: 5000 INJECTION, SOLUTION INTRAVENOUS; SUBCUTANEOUS at 08:21

## 2019-01-26 RX ADMIN — HEPARIN SODIUM SCH UNIT: 5000 INJECTION, SOLUTION INTRAVENOUS; SUBCUTANEOUS at 00:27

## 2019-01-26 RX ADMIN — DOCUSATE SODIUM AND SENNOSIDES SCH EACH: 50; 8.6 TABLET ORAL at 22:06

## 2019-01-26 RX ADMIN — CLOPIDOGREL BISULFATE SCH MG: 75 TABLET ORAL at 08:22

## 2019-01-26 RX ADMIN — HYDROCODONE BITARTRATE AND ACETAMINOPHEN PRN EACH: 5; 325 TABLET ORAL at 22:05

## 2019-01-26 RX ADMIN — INSULIN HUMAN SCH MLS/HR: 100 INJECTION, SOLUTION PARENTERAL at 00:45

## 2019-01-26 RX ADMIN — DILTIAZEM HYDROCHLORIDE SCH MG: 30 TABLET, FILM COATED ORAL at 11:56

## 2019-01-26 RX ADMIN — ATORVASTATIN CALCIUM SCH MG: 40 TABLET, FILM COATED ORAL at 08:22

## 2019-01-26 RX ADMIN — MUPIROCIN SCH APPLIC: 20 OINTMENT TOPICAL at 08:22

## 2019-01-26 RX ADMIN — POTASSIUM CHLORIDE SCH MLS/HR: 14.9 INJECTION, SOLUTION INTRAVENOUS at 12:09

## 2019-01-26 RX ADMIN — HYDROCODONE BITARTRATE AND ACETAMINOPHEN PRN EACH: 5; 325 TABLET ORAL at 04:56

## 2019-01-26 RX ADMIN — HYDROCODONE BITARTRATE AND ACETAMINOPHEN PRN EACH: 5; 325 TABLET ORAL at 10:30

## 2019-01-26 NOTE — XR
EXAMINATION TYPE: XR chest 1V portable

 

DATE OF EXAM: 1/26/2019

 

HISTORY: Post Operative Cardiac Surgery.

 

REFERENCE: Previous study dated 1/25/2019.

 

FINDINGS: There has been a midline sternotomy. The patient Rake-Fish catheter is been removed. A righ
t internal jugular sheath remains in place. Bilateral pleural drains are in place.

 

The heart is enlarged. There is mild vascular congestion. There is left basilar airspace disease and 
to a lesser extent right basilar airspace disease. There are small, bilateral effusions, greater on t
he left than the right.

 

IMPRESSION: 

 

CONTINUING POSTOPERATIVE CHANGE.

## 2019-01-26 NOTE — P.PN
Subjective


Progress Note Date: 01/26/19


Principal diagnosis: 





Status post CABG, postoperative day #2





Patient was seen today on 1/25/2019, she is postoperative day #1, off pump 

coronary artery bypass graft surgery 3, LIMA to LAD, right radial artery graft 

to the obtuse marginal and left radial arterial graft to the posterior 

descending artery, bilateral endovascular radial artery harvesting.  Patient 

was extubated uneventfully yesterday shortly after she was seen in the 

intensive care unit.  She tolerated the extubation well, and she is now on few 

liters nasal cannula.  Patient is relatively asymptomatic, she was maintained 

on Cardizem IV nitro IV dopamine overnight, she is hemodynamically stable, and 

denies any specific complaints today.  Chest x-ray is suggestive of mild 

postoperative interstitial edema.  And bibasilar atelectasis, expected 

postoperatively all labs were reviewed, hemoglobin is 8.0 today.  Renal profile 

is about baseline, creatinine is 1.25.  It was 1.33 yesterday.





Patient was reevaluated today on 1/26/2019, postoperative day #2.  Patient is 

doing relatively well, she denies any shortness of breath cough or wheezing.  

Remains on few liters nasal cannula.  She is hemodynamically stable.  Not 

requiring any pressors.  Hemoglobin is 7.6 today, and her creatinine is about 

her baseline 1.38.  Patient is doing fairly well with incentive spirometry, 

roughly about 700 mL.





Objective





- Vital Signs


Vital signs: 


 Vital Signs











Temp  98.0 F   01/26/19 08:00


 


Pulse  84   01/26/19 08:00


 


Resp  15   01/26/19 08:00


 


BP  99/64   01/26/19 08:00


 


Pulse Ox  96   01/26/19 09:09








 Intake & Output











 01/25/19 01/26/19 01/26/19





 18:59 06:59 18:59


 


Intake Total 1648.817 328.042 583.458


 


Output Total 1030 545 755


 


Balance 618.817 -216.958 -171.542


 


Weight 97 kg  


 


Intake:   


 


  .6 286 69


 


    .9NS Pressure Bag 36 36 9


 


    0.9NS Cardiac Output 30  


 


    ACETAMINOPHEN IV (For   





    ) 1,000 mg In Empty Bag 1   





    bag @ 400 mls/hr IVPB   





    Q6HR [facility] Rx#:[id number]   


 


    DOPamine DRIP 800 mg In 10.1  





    Dextrose/Water 1 500ml.   





    bag @ 3 MCG/KG/MIN 10.22   





    mls/hr IV .Q24H [facility] Rx#:   





    [id number]   


 


    Diltiazem 50 mg In Sodium 30  





    Chloride 0.9% 40 ml @   





    Per Protocol IV ONCE ONE   





    Rx#:[id number]   


 


    Lactated Ringers 1,000 ml 380 250 60





    @ 20 mls/hr IV .Q24H [facility]   





    Rx#:[id number]   


 


    Nitroglycerin-D5w Pmx 50 1.5  





    mg In Dextrose/Water 1   





    250ml.bag @ Per Protocol   





    IV ONCE ONE Rx#:[id number]   


 


  Intake, IV Titration 61.217 42.042 14.458





  Amount   


 


    Insulin Regular 100 unit 37.617 42.042 14.458





    In Sodium Chloride 0.9%   





    100 ml @ Per Protocol IV   





    .Q0M [facility] Rx#:[id number]   


 


    Nitroglycerin-D5w Pmx 50 23.6  





    mg In Dextrose/Water 1   





    250ml.bag @ 5 MCG/MIN 1.5   





    mls/hr IV .Q24H [facility] Rx#:   





    [id number]   


 


  Oral 1000  500


 


Output:   


 


  Chest Tube Drainage 600 10 680


 


    Left Pleural Chest Tube 280 10 300


 


    Mediastinal Chext Tube X 60  





    2   


 


    Right Pleural Chest Tube 260 0 380


 


  Urine 430 535 75


 


Other:   


 


  Voiding Method Indwelling Catheter Indwelling Catheter Indwelling Catheter








 ABP, PAP, CO, CI - Last Documented











Arterial Blood Pressure        102/52


 


Pulmonary Artery Pressure      39/22


 


Cardiac Output                 5.9


 


Cardiac Index                  2.9

















- Exam





Physical Exam: Revealed a 68-year-old female, pleasant, in no distress.  On 3 L 

nasal cannula.


Head: Atraumatic normocephalic.


Lymphatics: No lymphadenopathy.


HEENT:[Neck is supple.] [No neck masses.] [No thyromegaly.] [No JVD.]


Chest: [Minimal fine crackles at the bases.  No rhonchi no wheezes, chest tubes 

were noted.


Cardiac Exam: [Normal S1 and S2, no S3 gallop, no murmur.]


Abdomen: [Soft, nontender,  no megaly, no rebound, no guarding, normal bowel 

sounds.]


Extremities: [No clubbing, trace of bipedal edema, no cyanosis.]


Neurological Exam: [No focal neurologic deficit.


Normal mood, affect and mental status examination.]





- Labs


CBC & Chem 7: 


 01/26/19 04:49





 01/26/19 04:49


Labs: 


 Abnormal Lab Results - Last 24 Hours (Table)











  01/25/19 01/25/19 01/25/19 Range/Units





  10:24 11:24 12:34 


 


WBC     (3.8-10.6)  k/uL


 


RBC     (3.80-5.40)  m/uL


 


Hgb     (11.4-16.0)  gm/dL


 


Hct     (34.0-46.0)  %


 


Neutrophils #     (1.3-7.7)  k/uL


 


Lymphocytes #     (1.0-4.8)  k/uL


 


Chloride     ()  mmol/L


 


Carbon Dioxide     (22-30)  mmol/L


 


BUN     (7-17)  mg/dL


 


Creatinine     (0.52-1.04)  mg/dL


 


Glucose     (74-99)  mg/dL


 


POC Glucose (mg/dL)  143 H  130 H  122 H  (75-99)  mg/dL


 


Calcium     (8.4-10.2)  mg/dL


 


Phosphorus     (2.5-4.5)  mg/dL


 


Total Protein     (6.3-8.2)  g/dL


 


Albumin     (3.5-5.0)  g/dL














  01/25/19 01/25/19 01/25/19 Range/Units





  13:37 16:09 17:27 


 


WBC     (3.8-10.6)  k/uL


 


RBC     (3.80-5.40)  m/uL


 


Hgb     (11.4-16.0)  gm/dL


 


Hct     (34.0-46.0)  %


 


Neutrophils #     (1.3-7.7)  k/uL


 


Lymphocytes #     (1.0-4.8)  k/uL


 


Chloride     ()  mmol/L


 


Carbon Dioxide     (22-30)  mmol/L


 


BUN     (7-17)  mg/dL


 


Creatinine     (0.52-1.04)  mg/dL


 


Glucose     (74-99)  mg/dL


 


POC Glucose (mg/dL)  118 H  134 H  141 H  (75-99)  mg/dL


 


Calcium     (8.4-10.2)  mg/dL


 


Phosphorus     (2.5-4.5)  mg/dL


 


Total Protein     (6.3-8.2)  g/dL


 


Albumin     (3.5-5.0)  g/dL














  01/25/19 01/25/19 01/25/19 Range/Units





  19:04 19:58 21:04 


 


WBC     (3.8-10.6)  k/uL


 


RBC     (3.80-5.40)  m/uL


 


Hgb     (11.4-16.0)  gm/dL


 


Hct     (34.0-46.0)  %


 


Neutrophils #     (1.3-7.7)  k/uL


 


Lymphocytes #     (1.0-4.8)  k/uL


 


Chloride     ()  mmol/L


 


Carbon Dioxide     (22-30)  mmol/L


 


BUN     (7-17)  mg/dL


 


Creatinine     (0.52-1.04)  mg/dL


 


Glucose     (74-99)  mg/dL


 


POC Glucose (mg/dL)  154 H  167 H  149 H  (75-99)  mg/dL


 


Calcium     (8.4-10.2)  mg/dL


 


Phosphorus     (2.5-4.5)  mg/dL


 


Total Protein     (6.3-8.2)  g/dL


 


Albumin     (3.5-5.0)  g/dL














  01/25/19 01/25/19 01/26/19 Range/Units





  21:55 23:04 00:02 


 


WBC     (3.8-10.6)  k/uL


 


RBC     (3.80-5.40)  m/uL


 


Hgb     (11.4-16.0)  gm/dL


 


Hct     (34.0-46.0)  %


 


Neutrophils #     (1.3-7.7)  k/uL


 


Lymphocytes #     (1.0-4.8)  k/uL


 


Chloride     ()  mmol/L


 


Carbon Dioxide     (22-30)  mmol/L


 


BUN     (7-17)  mg/dL


 


Creatinine     (0.52-1.04)  mg/dL


 


Glucose     (74-99)  mg/dL


 


POC Glucose (mg/dL)  145 H  133 H  119 H  (75-99)  mg/dL


 


Calcium     (8.4-10.2)  mg/dL


 


Phosphorus     (2.5-4.5)  mg/dL


 


Total Protein     (6.3-8.2)  g/dL


 


Albumin     (3.5-5.0)  g/dL














  01/26/19 01/26/19 01/26/19 Range/Units





  01:13 01:56 02:56 


 


WBC     (3.8-10.6)  k/uL


 


RBC     (3.80-5.40)  m/uL


 


Hgb     (11.4-16.0)  gm/dL


 


Hct     (34.0-46.0)  %


 


Neutrophils #     (1.3-7.7)  k/uL


 


Lymphocytes #     (1.0-4.8)  k/uL


 


Chloride     ()  mmol/L


 


Carbon Dioxide     (22-30)  mmol/L


 


BUN     (7-17)  mg/dL


 


Creatinine     (0.52-1.04)  mg/dL


 


Glucose     (74-99)  mg/dL


 


POC Glucose (mg/dL)  113 H  110 H  109 H  (75-99)  mg/dL


 


Calcium     (8.4-10.2)  mg/dL


 


Phosphorus     (2.5-4.5)  mg/dL


 


Total Protein     (6.3-8.2)  g/dL


 


Albumin     (3.5-5.0)  g/dL














  01/26/19 01/26/19 01/26/19 Range/Units





  03:56 04:47 04:49 


 


WBC     (3.8-10.6)  k/uL


 


RBC     (3.80-5.40)  m/uL


 


Hgb     (11.4-16.0)  gm/dL


 


Hct     (34.0-46.0)  %


 


Neutrophils #     (1.3-7.7)  k/uL


 


Lymphocytes #     (1.0-4.8)  k/uL


 


Chloride    110 H  ()  mmol/L


 


Carbon Dioxide    20 L  (22-30)  mmol/L


 


BUN    34 H  (7-17)  mg/dL


 


Creatinine    1.38 H  (0.52-1.04)  mg/dL


 


Glucose    122 H  (74-99)  mg/dL


 


POC Glucose (mg/dL)  123 H  125 H   (75-99)  mg/dL


 


Calcium    8.1 L  (8.4-10.2)  mg/dL


 


Phosphorus    4.8 H  (2.5-4.5)  mg/dL


 


Total Protein    5.0 L  (6.3-8.2)  g/dL


 


Albumin    2.4 L  (3.5-5.0)  g/dL














  01/26/19 01/26/19 01/26/19 Range/Units





  04:49 06:10 06:53 


 


WBC  11.2 H    (3.8-10.6)  k/uL


 


RBC  2.64 L    (3.80-5.40)  m/uL


 


Hgb  7.6 L    (11.4-16.0)  gm/dL


 


Hct  24.0 L    (34.0-46.0)  %


 


Neutrophils #  9.3 H    (1.3-7.7)  k/uL


 


Lymphocytes #  0.8 L    (1.0-4.8)  k/uL


 


Chloride     ()  mmol/L


 


Carbon Dioxide     (22-30)  mmol/L


 


BUN     (7-17)  mg/dL


 


Creatinine     (0.52-1.04)  mg/dL


 


Glucose     (74-99)  mg/dL


 


POC Glucose (mg/dL)   116 H  106 H  (75-99)  mg/dL


 


Calcium     (8.4-10.2)  mg/dL


 


Phosphorus     (2.5-4.5)  mg/dL


 


Total Protein     (6.3-8.2)  g/dL


 


Albumin     (3.5-5.0)  g/dL














  01/26/19 Range/Units





  08:20 


 


WBC   (3.8-10.6)  k/uL


 


RBC   (3.80-5.40)  m/uL


 


Hgb   (11.4-16.0)  gm/dL


 


Hct   (34.0-46.0)  %


 


Neutrophils #   (1.3-7.7)  k/uL


 


Lymphocytes #   (1.0-4.8)  k/uL


 


Chloride   ()  mmol/L


 


Carbon Dioxide   (22-30)  mmol/L


 


BUN   (7-17)  mg/dL


 


Creatinine   (0.52-1.04)  mg/dL


 


Glucose   (74-99)  mg/dL


 


POC Glucose (mg/dL)  197 H  (75-99)  mg/dL


 


Calcium   (8.4-10.2)  mg/dL


 


Phosphorus   (2.5-4.5)  mg/dL


 


Total Protein   (6.3-8.2)  g/dL


 


Albumin   (3.5-5.0)  g/dL














Assessment and Plan


Assessment: 





Impression:





1 coronary artery disease, status post CABG, postoperative day #2 





2 status post three-vessel coronary artery bypass surgery.





3 ischemic cardiomyopathy and LV dysfunction





4 history of chronic systolic heart failure.





5 history of COPD





6 multiple comorbidities: history of diabetes, hyperlipidemia, iron deficiency 

anemia, peripheral vessel occlusive disease involving lower extremities, 

history of previous MI, and history of tobacco dependence presently in 

remission.





Recommendation: Continue all cardiac meds including beta blockers Plavix statin 

and aspirin.  Continue Cardizem orally  Continue to titrate O2 as tolerated.  

continue to encourage incentive spirometry, mediastinal chest tube will be 

discontinued today, continue bronchodilators, ambulate today, continue glucose 

management with insulin as per protocol, monitor x-rays on a daily basis, 

patient will remain in the ICU today, some of the chest tubes will come out 

today, and we'll continue to follow.


Time with Patient: Less than 30

## 2019-01-26 NOTE — PN
PROGRESS NOTE



DATE OF SERVICE:

January 26, 2019.



PRESENTING COMPLAINT:

CABG.



INTERVAL HISTORY:

Patient is status post coronary artery bypass, sitting up on a chair.   is

present.  Nasal cannula.  Breathing is a bit better.  Did tolerate some food.  Chest

tubes in place.  The patient is in sinus rhythm.  No pain.  Breathing is a bit shallow.



REVIEW OF SYSTEMS:

Done for constitutional, cardiovascular, GI, pulmonary; relevant findings as above.



CURRENT MEDICATIONS:

Reviewed.



PHYSICAL EXAMINATION:

VITAL SIGNS: Temperature 98, pulse 84, respirations 15, blood pressure 99/64, pulse ox

96% on 2 L.

GENERAL APPEARANCE:  Propped up in a chair.  Awake.  EYES:  Pupils equal.  Conjunctivae

pale.

NECK:  JVD unable to assess.  Mass not palpable.

RESPIRATORY: Effort increased. Lungs, decreased breath sounds.

CARDIOVASCULAR:  1st and 2nd sounds normal.  No edema.

ABDOMEN:  Soft, nontender.  Liver and spleen not palpable.

PSYCHIATRY: Alert and oriented x3.  Mood and affect slightly tired-appearing.



INVESTIGATIONS:

White count 11.2, hemoglobin 7.6, platelets 115, potassium 4.4, BUN 34, creatinine

1.38, albumin 2.4.



ASSESSMENT:

1. Status post triple vessel coronary bypass.

2. Chronic congestive heart failure from systolic dysfunction, ejection fraction 20-35

    percent from underlying coronary artery disease.

3. Acute non-Q-wave myocardial infarction recently.

4. Peripheral artery disease.

5. Chronic urinary incontinence.

6. Diabetes mellitus type 2 on oral hypoglycemics, currently on sliding scale.

7. Chronic kidney stage 3 from diabetic nephropathy and nephrosclerosis.

8. Acute postoperative blood loss anemia expected from surgery.

9. Metabolic acidosis from renal failure.

10.Hypoalbuminemia as an acute phase reactant.



PLAN:

Continue current medication and treatment plan.  Care was discussed with the patient.

Activity as tolerated.





MMODL / IJN: [id number] / Job#: [id number]

## 2019-01-26 NOTE — P.PN
Subjective


Progress Note Date: 01/26/19





This is a pleasant 68-year-old  female patient with past medical 

history the patient does have coronary artery disease with prior coronary 

artery stenting, diabetes type 2, hypertension, and dyslipidemia.who we 

consulted to see for cardiac follow-up after coronary artery bypass grafting.





Earlier this month, January 2019, the patient presented to the hospital with 

shortness of breath.  She was ruled in for acute non-ST deviation myocardial 

infarction with abnormal EKG as well as abnormal cardiac enzymes.  At that 

point she underwent heart catheterization by Dr. Thayer and that revealed severe 

triple-vessel coronary artery disease.  Because of that the patient was 

referred to undergo coronary artery bypass grafting.





The patient underwent yesterday coronary artery bypass grafting 3 where she 

received LIMA to LAD, HUDSON to OM, and radial artery to right coronary artery.





This is postoperative patient day #1.  The patient was extubated last night.  

She is doing overall good giving her previous status of severe underlying 

coronary artery disease as well as cardiomyopathy. She has been maintaining 

normal sinus mechanism.  Hemodynamically she is still requiring small doses of 

vasopressors.  Beside that she is on nitroglycerin drip as well as Cardizem 

drip for the radial bypasses.  The Cardizem drip is going to come off later on 

today as well as a nitro drip.  The patient will be started on calcium channel 

blocker by mouth either with Norvasc or with Cardizem by mouth.  Beside that 

she continues to be on dual antiplatelet therapy with aspirin and Plavix as 

well as she is on statin and also she is on metoprolol by mouth.





The last echocardiogram from January 2019 revealed impaired LV function with EF 

between 20-25% with basal inferior hypokinesia and inferoseptal hypokinesia.





On follow-up with the patient today, she is doing better.  She is not on any 

vasopressors at this point.  She continues to be on dual antiplatelet therapy 

along with a statin as well as Cardizem by mouth.  The hemoglobin this morning 

is 7.6 and the creatinine is 1.38.  Overall the patient is doing better, she 

was sitting in her bed and doesn't look in any pain or any distress.








Objective





- Vital Signs


Vital signs: 


 Vital Signs











Temp  97.4 F L  01/26/19 04:00


 


Pulse  93   01/26/19 05:00


 


Resp  21   01/26/19 05:00


 


BP  112/63   01/26/19 05:00


 


Pulse Ox  94 L  01/26/19 05:00








 Intake & Output











 01/25/19 01/26/19 01/26/19





 18:59 06:59 18:59


 


Intake Total 1648.817 328.042 2.525


 


Output Total 1030 545 


 


Balance 618.817 -216.958 2.525


 


Weight 97 kg  


 


Intake:   


 


  .6 286 


 


    .9NS Pressure Bag 36 36 


 


    0.9NS Cardiac Output 30  


 


    ACETAMINOPHEN IV (For   





    ) 1,000 mg In Empty Bag 1   





    bag @ 400 mls/hr IVPB   





    Q6HR [facility] Rx#:[id number]   


 


    DOPamine DRIP 800 mg In 10.1  





    Dextrose/Water 1 500ml.   





    bag @ 3 MCG/KG/MIN 10.22   





    mls/hr IV .Q24H [facility] Rx#:   





    [id number]   


 


    Diltiazem 50 mg In Sodium 30  





    Chloride 0.9% 40 ml @   





    Per Protocol IV ONCE ONE   





    Rx#:[id number]   


 


    Lactated Ringers 1,000 ml 380 250 





    @ 20 mls/hr IV .Q24H [facility]   





    Rx#:[id number]   


 


    Nitroglycerin-D5w Pmx 50 1.5  





    mg In Dextrose/Water 1   





    250ml.bag @ Per Protocol   





    IV ONCE ONE Rx#:[id number]   


 


  Intake, IV Titration 61.217 42.042 2.525





  Amount   


 


    Insulin Regular 100 unit 37.617 42.042 2.525





    In Sodium Chloride 0.9%   





    100 ml @ Per Protocol IV   





    .Q0M [facility] Rx#:[id number]   


 


    Nitroglycerin-D5w Pmx 50 23.6  





    mg In Dextrose/Water 1   





    250ml.bag @ 5 MCG/MIN 1.5   





    mls/hr IV .Q24H [facility] Rx#:   





    [id number]   


 


  Oral 1000  


 


Output:   


 


  Chest Tube Drainage 600 10 


 


    Left Pleural Chest Tube 280 10 


 


    Mediastinal Chext Tube X 60  





    2   


 


    Right Pleural Chest Tube 260 0 


 


  Urine 430 535 


 


Other:   


 


  Voiding Method Indwelling Catheter Indwelling Catheter 








 ABP, PAP, CO, CI - Last Documented











Arterial Blood Pressure        102/52


 


Pulmonary Artery Pressure      39/22


 


Cardiac Output                 5.9


 


Cardiac Index                  2.9

















- Constitutional


General appearance: Present: no acute distress





- Respiratory


Respiratory: bilateral: CTA





- Cardiovascular


Rhythm: regular


Heart sounds: normal: S1, S2





- Labs


CBC & Chem 7: 


 01/26/19 04:49





 01/26/19 04:49


Labs: 


 Abnormal Lab Results - Last 24 Hours (Table)











  01/25/19 01/25/19 01/25/19 Range/Units





  07:06 08:23 10:24 


 


WBC     (3.8-10.6)  k/uL


 


RBC     (3.80-5.40)  m/uL


 


Hgb     (11.4-16.0)  gm/dL


 


Hct     (34.0-46.0)  %


 


Neutrophils #     (1.3-7.7)  k/uL


 


Lymphocytes #     (1.0-4.8)  k/uL


 


Chloride     ()  mmol/L


 


Carbon Dioxide     (22-30)  mmol/L


 


BUN     (7-17)  mg/dL


 


Creatinine     (0.52-1.04)  mg/dL


 


Glucose     (74-99)  mg/dL


 


POC Glucose (mg/dL)  151 H  152 H  143 H  (75-99)  mg/dL


 


Calcium     (8.4-10.2)  mg/dL


 


Phosphorus     (2.5-4.5)  mg/dL


 


Total Protein     (6.3-8.2)  g/dL


 


Albumin     (3.5-5.0)  g/dL














  01/25/19 01/25/19 01/25/19 Range/Units





  11:24 12:34 13:37 


 


WBC     (3.8-10.6)  k/uL


 


RBC     (3.80-5.40)  m/uL


 


Hgb     (11.4-16.0)  gm/dL


 


Hct     (34.0-46.0)  %


 


Neutrophils #     (1.3-7.7)  k/uL


 


Lymphocytes #     (1.0-4.8)  k/uL


 


Chloride     ()  mmol/L


 


Carbon Dioxide     (22-30)  mmol/L


 


BUN     (7-17)  mg/dL


 


Creatinine     (0.52-1.04)  mg/dL


 


Glucose     (74-99)  mg/dL


 


POC Glucose (mg/dL)  130 H  122 H  118 H  (75-99)  mg/dL


 


Calcium     (8.4-10.2)  mg/dL


 


Phosphorus     (2.5-4.5)  mg/dL


 


Total Protein     (6.3-8.2)  g/dL


 


Albumin     (3.5-5.0)  g/dL














  01/25/19 01/25/19 01/25/19 Range/Units





  16:09 17:27 19:04 


 


WBC     (3.8-10.6)  k/uL


 


RBC     (3.80-5.40)  m/uL


 


Hgb     (11.4-16.0)  gm/dL


 


Hct     (34.0-46.0)  %


 


Neutrophils #     (1.3-7.7)  k/uL


 


Lymphocytes #     (1.0-4.8)  k/uL


 


Chloride     ()  mmol/L


 


Carbon Dioxide     (22-30)  mmol/L


 


BUN     (7-17)  mg/dL


 


Creatinine     (0.52-1.04)  mg/dL


 


Glucose     (74-99)  mg/dL


 


POC Glucose (mg/dL)  134 H  141 H  154 H  (75-99)  mg/dL


 


Calcium     (8.4-10.2)  mg/dL


 


Phosphorus     (2.5-4.5)  mg/dL


 


Total Protein     (6.3-8.2)  g/dL


 


Albumin     (3.5-5.0)  g/dL














  01/25/19 01/25/19 01/25/19 Range/Units





  19:58 21:04 21:55 


 


WBC     (3.8-10.6)  k/uL


 


RBC     (3.80-5.40)  m/uL


 


Hgb     (11.4-16.0)  gm/dL


 


Hct     (34.0-46.0)  %


 


Neutrophils #     (1.3-7.7)  k/uL


 


Lymphocytes #     (1.0-4.8)  k/uL


 


Chloride     ()  mmol/L


 


Carbon Dioxide     (22-30)  mmol/L


 


BUN     (7-17)  mg/dL


 


Creatinine     (0.52-1.04)  mg/dL


 


Glucose     (74-99)  mg/dL


 


POC Glucose (mg/dL)  167 H  149 H  145 H  (75-99)  mg/dL


 


Calcium     (8.4-10.2)  mg/dL


 


Phosphorus     (2.5-4.5)  mg/dL


 


Total Protein     (6.3-8.2)  g/dL


 


Albumin     (3.5-5.0)  g/dL














  01/25/19 01/26/19 01/26/19 Range/Units





  23:04 00:02 01:13 


 


WBC     (3.8-10.6)  k/uL


 


RBC     (3.80-5.40)  m/uL


 


Hgb     (11.4-16.0)  gm/dL


 


Hct     (34.0-46.0)  %


 


Neutrophils #     (1.3-7.7)  k/uL


 


Lymphocytes #     (1.0-4.8)  k/uL


 


Chloride     ()  mmol/L


 


Carbon Dioxide     (22-30)  mmol/L


 


BUN     (7-17)  mg/dL


 


Creatinine     (0.52-1.04)  mg/dL


 


Glucose     (74-99)  mg/dL


 


POC Glucose (mg/dL)  133 H  119 H  113 H  (75-99)  mg/dL


 


Calcium     (8.4-10.2)  mg/dL


 


Phosphorus     (2.5-4.5)  mg/dL


 


Total Protein     (6.3-8.2)  g/dL


 


Albumin     (3.5-5.0)  g/dL














  01/26/19 01/26/19 01/26/19 Range/Units





  01:56 02:56 03:56 


 


WBC     (3.8-10.6)  k/uL


 


RBC     (3.80-5.40)  m/uL


 


Hgb     (11.4-16.0)  gm/dL


 


Hct     (34.0-46.0)  %


 


Neutrophils #     (1.3-7.7)  k/uL


 


Lymphocytes #     (1.0-4.8)  k/uL


 


Chloride     ()  mmol/L


 


Carbon Dioxide     (22-30)  mmol/L


 


BUN     (7-17)  mg/dL


 


Creatinine     (0.52-1.04)  mg/dL


 


Glucose     (74-99)  mg/dL


 


POC Glucose (mg/dL)  110 H  109 H  123 H  (75-99)  mg/dL


 


Calcium     (8.4-10.2)  mg/dL


 


Phosphorus     (2.5-4.5)  mg/dL


 


Total Protein     (6.3-8.2)  g/dL


 


Albumin     (3.5-5.0)  g/dL














  01/26/19 01/26/19 01/26/19 Range/Units





  04:47 04:49 04:49 


 


WBC    11.2 H  (3.8-10.6)  k/uL


 


RBC    2.64 L  (3.80-5.40)  m/uL


 


Hgb    7.6 L  (11.4-16.0)  gm/dL


 


Hct    24.0 L  (34.0-46.0)  %


 


Neutrophils #    9.3 H  (1.3-7.7)  k/uL


 


Lymphocytes #    0.8 L  (1.0-4.8)  k/uL


 


Chloride   110 H   ()  mmol/L


 


Carbon Dioxide   20 L   (22-30)  mmol/L


 


BUN   34 H   (7-17)  mg/dL


 


Creatinine   1.38 H   (0.52-1.04)  mg/dL


 


Glucose   122 H   (74-99)  mg/dL


 


POC Glucose (mg/dL)  125 H    (75-99)  mg/dL


 


Calcium   8.1 L   (8.4-10.2)  mg/dL


 


Phosphorus   4.8 H   (2.5-4.5)  mg/dL


 


Total Protein   5.0 L   (6.3-8.2)  g/dL


 


Albumin   2.4 L   (3.5-5.0)  g/dL














  01/26/19 01/26/19 Range/Units





  06:10 06:53 


 


WBC    (3.8-10.6)  k/uL


 


RBC    (3.80-5.40)  m/uL


 


Hgb    (11.4-16.0)  gm/dL


 


Hct    (34.0-46.0)  %


 


Neutrophils #    (1.3-7.7)  k/uL


 


Lymphocytes #    (1.0-4.8)  k/uL


 


Chloride    ()  mmol/L


 


Carbon Dioxide    (22-30)  mmol/L


 


BUN    (7-17)  mg/dL


 


Creatinine    (0.52-1.04)  mg/dL


 


Glucose    (74-99)  mg/dL


 


POC Glucose (mg/dL)  116 H  106 H  (75-99)  mg/dL


 


Calcium    (8.4-10.2)  mg/dL


 


Phosphorus    (2.5-4.5)  mg/dL


 


Total Protein    (6.3-8.2)  g/dL


 


Albumin    (3.5-5.0)  g/dL














Assessment and Plan


Assessment: 





Assessment


#1 severe underlying coronary artery disease as described above


#2 status post coronary artery bypass grafting 3 as described above


#3 diabetes type 2


#4 hypertension


#5 dyslipidemia





Plan


#1 continue the current medical regimen including dual antiplatelet therapy 

along with statin and metoprolol


#2 continue monitor the hemoglobin as well as BUN and electrolytes


#3 continue daily chest x-ray


#4 follow-up with the patient.








Thank you for allowing us participate in her care and we will continue 

following up with the patient

## 2019-01-26 NOTE — P.PN
Subjective


Progress Note Date: 01/26/19


Principal diagnosis: 





Coronary artery disease, chronic systolic and diastolic heart failure with 

preoperative ejection fraction 20-25%.  Previous medical history of non-STEMI 

earlier this month as well as in 2006 with stent placement at that time, 

hyperlipidemia, peripheral artery disease with stent placement to the left 

lower extremity in 2016, chronic kidney disease with baseline creatinine 1.3-1.5

, recent vein surgery to bilateral lower extremity is, non-insulin-dependent 

diabetes mellitus with preoperative hemoglobin A1c 7.1%, shingles, obesity, 

moderate COPD with preoperative FEV1 51% of predicted, previous tobacco 

dependence, and family history of early coronary artery disease with mother 

dying before the age of 60 years old during coronary artery bypass graft 

surgery.  Preoperative nasal swab positive for MSSA.  Preoperative normocytic, 

normochromic anemia.





POD #2 off-pump coronary artery bypass graft surgery 3 with the left internal 

mammary artery artery graft to the left anterior descending artery, right 

radial artery graft to the obtuse marginal and the left radial arterial graft 

to the posterior descending artery, bilateral endovascular radial artery 

harvest.  Intraoperative transesophageal echocardiogram by anesthesia.





Postoperative acute blood loss anemia, an expected outcome.





Patient is currently sitting up to the bedside chair in the intensive care 

unit.  She is in no acute distress.  Currently denies any complaints of pain or 

shortness of breath at this time.  She remains on 2 L nasal cannula with oxygen 

saturations 97%.  Cardizem 30 mg every 6 hours by mouth was started yesterday 

for her bilateral radial artery harvest.  She remains hemodynamically stable.  

She reports she ambulated in her room yesterday but did not ambulate in the 

intensive care yesterday.





Objective





- Vital Signs


Vital signs: 


 Vital Signs











Temp  98.0 F   01/26/19 08:00


 


Pulse  84   01/26/19 08:00


 


Resp  15   01/26/19 08:00


 


BP  99/64   01/26/19 08:00


 


Pulse Ox  96   01/26/19 09:09








 Intake & Output











 01/25/19 01/26/19 01/26/19





 18:59 06:59 18:59


 


Intake Total 1648.817 328.042 583.458


 


Output Total 1030 545 755


 


Balance 618.817 -216.958 -171.542


 


Weight 97 kg  


 


Intake:   


 


  .6 286 69


 


    .9NS Pressure Bag 36 36 9


 


    0.9NS Cardiac Output 30  


 


    ACETAMINOPHEN IV (For   





    ) 1,000 mg In Empty Bag 1   





    bag @ 400 mls/hr IVPB   





    Q6HR [facility] Rx#:[id number]   


 


    DOPamine DRIP 800 mg In 10.1  





    Dextrose/Water 1 500ml.   





    bag @ 3 MCG/KG/MIN 10.22   





    mls/hr IV .Q24H [facility] Rx#:   





    [id number]   


 


    Diltiazem 50 mg In Sodium 30  





    Chloride 0.9% 40 ml @   





    Per Protocol IV ONCE ONE   





    Rx#:[id number]   


 


    Lactated Ringers 1,000 ml 380 250 60





    @ 20 mls/hr IV .Q24H [facility]   





    Rx#:[id number]   


 


    Nitroglycerin-D5w Pmx 50 1.5  





    mg In Dextrose/Water 1   





    250ml.bag @ Per Protocol   





    IV ONCE ONE Rx#:[id number]   


 


  Intake, IV Titration 61.217 42.042 14.458





  Amount   


 


    Insulin Regular 100 unit 37.617 42.042 14.458





    In Sodium Chloride 0.9%   





    100 ml @ Per Protocol IV   





    .Q0M [facility] Rx#:[id number]   


 


    Nitroglycerin-D5w Pmx 50 23.6  





    mg In Dextrose/Water 1   





    250ml.bag @ 5 MCG/MIN 1.5   





    mls/hr IV .Q24H [facility] Rx#:   





    [id number]   


 


  Oral 1000  500


 


Output:   


 


  Chest Tube Drainage 600 10 680


 


    Left Pleural Chest Tube 280 10 300


 


    Mediastinal Chext Tube X 60  





    2   


 


    Right Pleural Chest Tube 260 0 380


 


  Urine 430 535 75


 


Other:   


 


  Voiding Method Indwelling Catheter Indwelling Catheter Indwelling Catheter








 ABP, PAP, CO, CI - Last Documented











Arterial Blood Pressure        102/52


 


Pulmonary Artery Pressure      39/22


 


Cardiac Output                 5.9


 


Cardiac Index                  2.9

















- Constitutional


General appearance: Present: cooperative, no acute distress, obese





- Respiratory


Details: 





Few scattered crackles throughout, diminished bilateral bases.  Respirations 

are symmetrical and nonlabored.  Oxygen saturation are 97% on 2 L nasal 

cannula.  She is achieving 750 mL on her incentive spirometry.  Left and right 

pleural chest tubes remained to low continuous wall suction -20 cm H2O.  No air 

leak is present.  Draining thin serosanguineous drainage.  Right pleural chest 

tube drained 400 mL output in the last 24 hours, 190 mL output in the last 8 

hours.  Left pleural chest tube drained 340 mL output last 24 hours, 150 mL 

output in the last 8 hours.





- Cardiovascular


Details: 





Regular rhythm and rate.  S1 and S2 present, negative for S3, gallop or murmur.

  Sternum is stable.  Bedside telemetry showing normal sinus rhythm heart rate 

90.  +1 edema present to her bilateral lower extremities.  Right IJ Cordis in 

place with continuous CVP monitoring, current CVP pressure 7 mmHg.  Heart 

hugger is in place and she is demonstrating appropriate use.  Knee-high BE 

hose and sequential compression devices in place to her bilateral lower 

extremities.





- Gastrointestinal


Gastrointestinal Comment(s): 





Abdomen is soft, nontender and nondistended.  Active bowel sounds to all 4 

abdominal quadrants.  Tolerating oral intake.  Passing flatus.





- Genitourinary


Genitourinary Comment(s): 





Lerma catheter for accurate I&O.  Draining clear yellow urine.  305 mL output 

in the last 8 hours.





- Integumentary


Integumentary Comment(s): 





Skin is warm and dry.  No clubbing or cyanosis is present.  Midline sternal 

incision is clean, dry and approximated.  No drainage or redness present.  

Bilateral radial artery harvest sites clean, dry and approximated.  No drainage 

or redness present.  Bilateral hands are warm to touch.





- Neurologic


Neurologic: Present: CNII-XII intact





- Musculoskeletal


Musculoskeletal: Present: gait normal, generalized weakness, strength equal 

bilaterally





- Psychiatric


Psychiatric: Present: A&O x's 3, appropriate affect, intact judgment & insight





- Allied health notes


Allied health notes reviewed: nursing





- Labs


CBC & Chem 7: 


 01/26/19 04:49





 01/26/19 04:49


Labs: 


 Abnormal Lab Results - Last 24 Hours (Table)











  01/25/19 01/25/19 01/25/19 Range/Units





  10:24 11:24 12:34 


 


WBC     (3.8-10.6)  k/uL


 


RBC     (3.80-5.40)  m/uL


 


Hgb     (11.4-16.0)  gm/dL


 


Hct     (34.0-46.0)  %


 


Neutrophils #     (1.3-7.7)  k/uL


 


Lymphocytes #     (1.0-4.8)  k/uL


 


Chloride     ()  mmol/L


 


Carbon Dioxide     (22-30)  mmol/L


 


BUN     (7-17)  mg/dL


 


Creatinine     (0.52-1.04)  mg/dL


 


Glucose     (74-99)  mg/dL


 


POC Glucose (mg/dL)  143 H  130 H  122 H  (75-99)  mg/dL


 


Calcium     (8.4-10.2)  mg/dL


 


Phosphorus     (2.5-4.5)  mg/dL


 


Total Protein     (6.3-8.2)  g/dL


 


Albumin     (3.5-5.0)  g/dL














  01/25/19 01/25/19 01/25/19 Range/Units





  13:37 16:09 17:27 


 


WBC     (3.8-10.6)  k/uL


 


RBC     (3.80-5.40)  m/uL


 


Hgb     (11.4-16.0)  gm/dL


 


Hct     (34.0-46.0)  %


 


Neutrophils #     (1.3-7.7)  k/uL


 


Lymphocytes #     (1.0-4.8)  k/uL


 


Chloride     ()  mmol/L


 


Carbon Dioxide     (22-30)  mmol/L


 


BUN     (7-17)  mg/dL


 


Creatinine     (0.52-1.04)  mg/dL


 


Glucose     (74-99)  mg/dL


 


POC Glucose (mg/dL)  118 H  134 H  141 H  (75-99)  mg/dL


 


Calcium     (8.4-10.2)  mg/dL


 


Phosphorus     (2.5-4.5)  mg/dL


 


Total Protein     (6.3-8.2)  g/dL


 


Albumin     (3.5-5.0)  g/dL














  01/25/19 01/25/19 01/25/19 Range/Units





  19:04 19:58 21:04 


 


WBC     (3.8-10.6)  k/uL


 


RBC     (3.80-5.40)  m/uL


 


Hgb     (11.4-16.0)  gm/dL


 


Hct     (34.0-46.0)  %


 


Neutrophils #     (1.3-7.7)  k/uL


 


Lymphocytes #     (1.0-4.8)  k/uL


 


Chloride     ()  mmol/L


 


Carbon Dioxide     (22-30)  mmol/L


 


BUN     (7-17)  mg/dL


 


Creatinine     (0.52-1.04)  mg/dL


 


Glucose     (74-99)  mg/dL


 


POC Glucose (mg/dL)  154 H  167 H  149 H  (75-99)  mg/dL


 


Calcium     (8.4-10.2)  mg/dL


 


Phosphorus     (2.5-4.5)  mg/dL


 


Total Protein     (6.3-8.2)  g/dL


 


Albumin     (3.5-5.0)  g/dL














  01/25/19 01/25/19 01/26/19 Range/Units





  21:55 23:04 00:02 


 


WBC     (3.8-10.6)  k/uL


 


RBC     (3.80-5.40)  m/uL


 


Hgb     (11.4-16.0)  gm/dL


 


Hct     (34.0-46.0)  %


 


Neutrophils #     (1.3-7.7)  k/uL


 


Lymphocytes #     (1.0-4.8)  k/uL


 


Chloride     ()  mmol/L


 


Carbon Dioxide     (22-30)  mmol/L


 


BUN     (7-17)  mg/dL


 


Creatinine     (0.52-1.04)  mg/dL


 


Glucose     (74-99)  mg/dL


 


POC Glucose (mg/dL)  145 H  133 H  119 H  (75-99)  mg/dL


 


Calcium     (8.4-10.2)  mg/dL


 


Phosphorus     (2.5-4.5)  mg/dL


 


Total Protein     (6.3-8.2)  g/dL


 


Albumin     (3.5-5.0)  g/dL














  01/26/19 01/26/19 01/26/19 Range/Units





  01:13 01:56 02:56 


 


WBC     (3.8-10.6)  k/uL


 


RBC     (3.80-5.40)  m/uL


 


Hgb     (11.4-16.0)  gm/dL


 


Hct     (34.0-46.0)  %


 


Neutrophils #     (1.3-7.7)  k/uL


 


Lymphocytes #     (1.0-4.8)  k/uL


 


Chloride     ()  mmol/L


 


Carbon Dioxide     (22-30)  mmol/L


 


BUN     (7-17)  mg/dL


 


Creatinine     (0.52-1.04)  mg/dL


 


Glucose     (74-99)  mg/dL


 


POC Glucose (mg/dL)  113 H  110 H  109 H  (75-99)  mg/dL


 


Calcium     (8.4-10.2)  mg/dL


 


Phosphorus     (2.5-4.5)  mg/dL


 


Total Protein     (6.3-8.2)  g/dL


 


Albumin     (3.5-5.0)  g/dL














  01/26/19 01/26/19 01/26/19 Range/Units





  03:56 04:47 04:49 


 


WBC     (3.8-10.6)  k/uL


 


RBC     (3.80-5.40)  m/uL


 


Hgb     (11.4-16.0)  gm/dL


 


Hct     (34.0-46.0)  %


 


Neutrophils #     (1.3-7.7)  k/uL


 


Lymphocytes #     (1.0-4.8)  k/uL


 


Chloride    110 H  ()  mmol/L


 


Carbon Dioxide    20 L  (22-30)  mmol/L


 


BUN    34 H  (7-17)  mg/dL


 


Creatinine    1.38 H  (0.52-1.04)  mg/dL


 


Glucose    122 H  (74-99)  mg/dL


 


POC Glucose (mg/dL)  123 H  125 H   (75-99)  mg/dL


 


Calcium    8.1 L  (8.4-10.2)  mg/dL


 


Phosphorus    4.8 H  (2.5-4.5)  mg/dL


 


Total Protein    5.0 L  (6.3-8.2)  g/dL


 


Albumin    2.4 L  (3.5-5.0)  g/dL














  01/26/19 01/26/19 01/26/19 Range/Units





  04:49 06:10 06:53 


 


WBC  11.2 H    (3.8-10.6)  k/uL


 


RBC  2.64 L    (3.80-5.40)  m/uL


 


Hgb  7.6 L    (11.4-16.0)  gm/dL


 


Hct  24.0 L    (34.0-46.0)  %


 


Neutrophils #  9.3 H    (1.3-7.7)  k/uL


 


Lymphocytes #  0.8 L    (1.0-4.8)  k/uL


 


Chloride     ()  mmol/L


 


Carbon Dioxide     (22-30)  mmol/L


 


BUN     (7-17)  mg/dL


 


Creatinine     (0.52-1.04)  mg/dL


 


Glucose     (74-99)  mg/dL


 


POC Glucose (mg/dL)   116 H  106 H  (75-99)  mg/dL


 


Calcium     (8.4-10.2)  mg/dL


 


Phosphorus     (2.5-4.5)  mg/dL


 


Total Protein     (6.3-8.2)  g/dL


 


Albumin     (3.5-5.0)  g/dL














  01/26/19 Range/Units





  08:20 


 


WBC   (3.8-10.6)  k/uL


 


RBC   (3.80-5.40)  m/uL


 


Hgb   (11.4-16.0)  gm/dL


 


Hct   (34.0-46.0)  %


 


Neutrophils #   (1.3-7.7)  k/uL


 


Lymphocytes #   (1.0-4.8)  k/uL


 


Chloride   ()  mmol/L


 


Carbon Dioxide   (22-30)  mmol/L


 


BUN   (7-17)  mg/dL


 


Creatinine   (0.52-1.04)  mg/dL


 


Glucose   (74-99)  mg/dL


 


POC Glucose (mg/dL)  197 H  (75-99)  mg/dL


 


Calcium   (8.4-10.2)  mg/dL


 


Phosphorus   (2.5-4.5)  mg/dL


 


Total Protein   (6.3-8.2)  g/dL


 


Albumin   (3.5-5.0)  g/dL














- Imaging and Cardiology


Chest x-ray: report reviewed, image reviewed





Assessment and Plan


Assessment: 





1.  Coronary artery disease


2.  Ischemic cardiomyopathy


3.  Status post three-vessel coronary artery bypass


4.  Systolic heart failure


5.  Chronic obstructive pulmonary disease


6.  Diabetes mellitus


7.  History of heart artery stents


8.  Hyperlipidemia


9.  Iron deficiency anemia


10.  Obesity (BMI 30.034.9)


11.  Peripheral arterial disease


12.  History of myocardial infarction, greater than 8 weeks ago


13.  Tobacco dependence in remission


Plan: 





1.  Continue aspirin, statin, Plavix, beta blocker therapy.  Will increase 

metoprolol tartrate 25 mg by mouth twice a day.


2.  We will keep her right and left pleural chest tubes in place today to low 

continuous wall suction -20 cm H2O.


3.  Continue oral Cardizem for radial artery spasm.  Do not discontinue.


4.  Wean O2 as tolerated.  Encourage incentive spirometry is 10 times every 

hour while awake.


5.  Discontinue right IJ Cordis.


6.  Increase activity as tolerated.  PT/OT/cardiac rehab following.


7.  No diuresis today.  


8.  Bronchodilator management per pulmonology.


9.  Pain control with current medication regimen.  No Toradol secondary to 

chronic renal insufficiency.


10.  GI prophylaxis with Protonix, DVT prophylaxis with subcu heparin, SCDs.


11.  Insulin management per primary care service.


12.  Will monitor daily labs and x-rays.  Electrolyte replacement per protocol.


13.  Dietitian to see patient for education regarding weight loss, reduced fat, 

reduced sugar diet.


14.  More recommendations to follow based on patient's clinical course.


 


Time with Patient: Greater than 30

## 2019-01-27 LAB
ALBUMIN SERPL-MCNC: 2.7 G/DL (ref 3.5–5)
ALP SERPL-CCNC: 92 U/L (ref 38–126)
ALT SERPL-CCNC: 17 U/L (ref 9–52)
ANION GAP SERPL CALC-SCNC: 9 MMOL/L
AST SERPL-CCNC: 13 U/L (ref 14–36)
BUN SERPL-SCNC: 45 MG/DL (ref 7–17)
CALCIUM SPEC-MCNC: 8.6 MG/DL (ref 8.4–10.2)
CHLORIDE SERPL-SCNC: 109 MMOL/L (ref 98–107)
CO2 SERPL-SCNC: 19 MMOL/L (ref 22–30)
ERYTHROCYTE [DISTWIDTH] IN BLOOD BY AUTOMATED COUNT: 2.73 M/UL (ref 3.8–5.4)
ERYTHROCYTE [DISTWIDTH] IN BLOOD: 15.1 % (ref 11.5–15.5)
GLUCOSE BLD-MCNC: 193 MG/DL (ref 75–99)
GLUCOSE BLD-MCNC: 194 MG/DL (ref 75–99)
GLUCOSE BLD-MCNC: 207 MG/DL (ref 75–99)
GLUCOSE BLD-MCNC: 229 MG/DL (ref 75–99)
GLUCOSE SERPL-MCNC: 190 MG/DL (ref 74–99)
HCT VFR BLD AUTO: 25.1 % (ref 34–46)
HGB BLD-MCNC: 7.8 GM/DL (ref 11.4–16)
MAGNESIUM SPEC-SCNC: 2.6 MG/DL (ref 1.6–2.3)
MCH RBC QN AUTO: 28.6 PG (ref 25–35)
MCHC RBC AUTO-ENTMCNC: 31 G/DL (ref 31–37)
MCV RBC AUTO: 92.1 FL (ref 80–100)
PLATELET # BLD AUTO: 290 K/UL (ref 150–450)
POTASSIUM SERPL-SCNC: 4.9 MMOL/L (ref 3.5–5.1)
PROT SERPL-MCNC: 5.5 G/DL (ref 6.3–8.2)
SODIUM SERPL-SCNC: 137 MMOL/L (ref 137–145)
WBC # BLD AUTO: 12.7 K/UL (ref 3.8–10.6)

## 2019-01-27 RX ADMIN — DILTIAZEM HYDROCHLORIDE SCH MG: 30 TABLET, FILM COATED ORAL at 12:14

## 2019-01-27 RX ADMIN — MAGNESIUM HYDROXIDE PRN MG: 2400 SUSPENSION ORAL at 08:54

## 2019-01-27 RX ADMIN — IPRATROPIUM BROMIDE AND ALBUTEROL SULFATE SCH: .5; 3 SOLUTION RESPIRATORY (INHALATION) at 15:58

## 2019-01-27 RX ADMIN — HEPARIN SODIUM SCH UNIT: 5000 INJECTION, SOLUTION INTRAVENOUS; SUBCUTANEOUS at 08:53

## 2019-01-27 RX ADMIN — DILTIAZEM HYDROCHLORIDE SCH MG: 30 TABLET, FILM COATED ORAL at 00:38

## 2019-01-27 RX ADMIN — INSULIN ASPART SCH UNIT: 100 INJECTION, SOLUTION INTRAVENOUS; SUBCUTANEOUS at 17:25

## 2019-01-27 RX ADMIN — METOPROLOL TARTRATE SCH MG: 25 TABLET, FILM COATED ORAL at 08:53

## 2019-01-27 RX ADMIN — SODIUM CHLORIDE, PRESERVATIVE FREE SCH ML: 5 INJECTION INTRAVENOUS at 08:54

## 2019-01-27 RX ADMIN — HEPARIN SODIUM SCH UNIT: 5000 INJECTION, SOLUTION INTRAVENOUS; SUBCUTANEOUS at 16:57

## 2019-01-27 RX ADMIN — DOCUSATE SODIUM AND SENNOSIDES SCH EACH: 50; 8.6 TABLET ORAL at 22:28

## 2019-01-27 RX ADMIN — SODIUM CHLORIDE, PRESERVATIVE FREE SCH: 5 INJECTION INTRAVENOUS at 04:36

## 2019-01-27 RX ADMIN — HEPARIN SODIUM SCH UNIT: 5000 INJECTION, SOLUTION INTRAVENOUS; SUBCUTANEOUS at 00:39

## 2019-01-27 RX ADMIN — IPRATROPIUM BROMIDE AND ALBUTEROL SULFATE SCH ML: .5; 3 SOLUTION RESPIRATORY (INHALATION) at 19:14

## 2019-01-27 RX ADMIN — HEPARIN SODIUM SCH: 5000 INJECTION, SOLUTION INTRAVENOUS; SUBCUTANEOUS at 00:44

## 2019-01-27 RX ADMIN — INSULIN ASPART SCH UNIT: 100 INJECTION, SOLUTION INTRAVENOUS; SUBCUTANEOUS at 22:28

## 2019-01-27 RX ADMIN — DILTIAZEM HYDROCHLORIDE SCH MG: 30 TABLET, FILM COATED ORAL at 06:56

## 2019-01-27 RX ADMIN — HYDROCODONE BITARTRATE AND ACETAMINOPHEN PRN EACH: 5; 325 TABLET ORAL at 22:30

## 2019-01-27 RX ADMIN — CLOPIDOGREL BISULFATE SCH MG: 75 TABLET ORAL at 08:53

## 2019-01-27 RX ADMIN — METOPROLOL TARTRATE SCH MG: 25 TABLET, FILM COATED ORAL at 22:28

## 2019-01-27 RX ADMIN — ASPIRIN 325 MG ORAL TABLET SCH MG: 325 PILL ORAL at 08:53

## 2019-01-27 RX ADMIN — INSULIN DETEMIR SCH UNIT: 100 INJECTION, SOLUTION SUBCUTANEOUS at 22:28

## 2019-01-27 RX ADMIN — SODIUM CHLORIDE, PRESERVATIVE FREE SCH ML: 5 INJECTION INTRAVENOUS at 22:29

## 2019-01-27 RX ADMIN — INSULIN ASPART SCH UNIT: 100 INJECTION, SOLUTION INTRAVENOUS; SUBCUTANEOUS at 12:10

## 2019-01-27 RX ADMIN — MUPIROCIN SCH APPLIC: 20 OINTMENT TOPICAL at 22:28

## 2019-01-27 RX ADMIN — POTASSIUM CHLORIDE SCH MLS/HR: 14.9 INJECTION, SOLUTION INTRAVENOUS at 12:15

## 2019-01-27 RX ADMIN — ATORVASTATIN CALCIUM SCH MG: 40 TABLET, FILM COATED ORAL at 08:53

## 2019-01-27 RX ADMIN — IPRATROPIUM BROMIDE AND ALBUTEROL SULFATE SCH ML: .5; 3 SOLUTION RESPIRATORY (INHALATION) at 11:38

## 2019-01-27 RX ADMIN — IPRATROPIUM BROMIDE AND ALBUTEROL SULFATE SCH ML: .5; 3 SOLUTION RESPIRATORY (INHALATION) at 08:17

## 2019-01-27 RX ADMIN — MUPIROCIN SCH APPLIC: 20 OINTMENT TOPICAL at 08:53

## 2019-01-27 RX ADMIN — INSULIN ASPART SCH UNIT: 100 INJECTION, SOLUTION INTRAVENOUS; SUBCUTANEOUS at 07:10

## 2019-01-27 RX ADMIN — HYDROCODONE BITARTRATE AND ACETAMINOPHEN PRN EACH: 5; 325 TABLET ORAL at 08:52

## 2019-01-27 RX ADMIN — HYDROCODONE BITARTRATE AND ACETAMINOPHEN PRN EACH: 5; 325 TABLET ORAL at 12:10

## 2019-01-27 RX ADMIN — HYDROCODONE BITARTRATE AND ACETAMINOPHEN PRN EACH: 5; 325 TABLET ORAL at 04:33

## 2019-01-27 RX ADMIN — PANTOPRAZOLE SODIUM SCH MG: 40 TABLET, DELAYED RELEASE ORAL at 08:53

## 2019-01-27 RX ADMIN — DILTIAZEM HYDROCHLORIDE SCH: 30 TABLET, FILM COATED ORAL at 00:45

## 2019-01-27 RX ADMIN — DILTIAZEM HYDROCHLORIDE SCH MG: 30 TABLET, FILM COATED ORAL at 17:25

## 2019-01-27 RX ADMIN — ONDANSETRON PRN MG: 2 INJECTION INTRAMUSCULAR; INTRAVENOUS at 13:50

## 2019-01-27 NOTE — XR
EXAMINATION TYPE: XR chest 1V

 

DATE OF EXAM: 1/27/2019

 

HISTORY: chest tubes.

 

REFERENCE: Previous study dated 1/26/2019.

 

FINDINGS: There has been a midline sternotomy. The heart is enlarged. The patient's right internal ju
gular sheath is been removed. No definite pneumothorax is seen. There continue be bilateral pleural d
rains in place. There is bibasilar airspace disease, worse on the left than the right. There are bila
teral effusions, worse on the left than the right.

 

IMPRESSION: CONTINUING POSTOPERATIVE CHANGE.

## 2019-01-27 NOTE — P.PN
Subjective


Progress Note Date: 01/27/19


Principal diagnosis: 





Coronary artery disease, chronic systolic and diastolic heart failure with 

preoperative ejection fraction 20-25%, ischemic cardiomyopathy and LV 

dysfunction, history of non-STEMI earlier this month as well as in 2006 with 

stent placement at that time, hyperlipidemia, peripheral artery disease with 

stent placement to the left lower extremity in 2016, chronic kidney disease 

with baseline creatinine 1.3-1.5, recent vein surgery to bilateral lower 

extremity is, non-insulin-dependent diabetes mellitus with preoperative 

hemoglobin A1c 7.1%, shingles, obesity, moderate COPD with preoperative FEV1 51

% of predicted, previous tobacco dependence, and family history of early 

coronary artery disease with mother dying before the age of 60 years old during 

coronary artery bypass graft surgery.  Preoperative nasal swab positive for 

MSSA.  Preoperative normocytic, normochromic anemia.





POD #3 off-pump coronary artery bypass graft surgery 3 with the left internal 

mammary artery artery graft to the left anterior descending artery, right 

radial artery graft to the obtuse marginal and the left radial arterial graft 

to the posterior descending artery, bilateral endovascular radial artery 

harvest.  Intraoperative transesophageal echocardiogram by anesthesia.





Postoperative acute blood loss anemia, an expected outcome.





Patient is currently sitting up to the bedside chair in the intensive care 

unit.  She is in no acute distress.  She is complaining of pain to her chest 

tube insertion sites 3 out of 10 on the pain scale, she denies any complaints 

of shortness of breath..  She remains on 1 L nasal cannula with oxygen 

saturations 93%.  She remains on Cardizem 30 mg every 6 hours by mouth for her 

bilateral radial artery harvest.  She remains hemodynamically stable.  She 

reports she ambulated in her room yesterday 2 but did not ambulate in the 

intensive care hallway.





Objective





- Vital Signs


Vital signs: 


 Vital Signs











Temp  97.2 F L  01/27/19 08:00


 


Pulse  86   01/27/19 11:49


 


Resp  9 L  01/27/19 11:00


 


BP  119/65   01/27/19 11:00


 


Pulse Ox  94 L  01/27/19 11:00








 Intake & Output











 01/26/19 01/27/19 01/27/19





 18:59 06:59 18:59


 


Intake Total 652.458 240 750


 


Output Total 910 640 300


 


Balance -257.542 -400 450


 


Intake:   


 


    


 


    .9NS Pressure Bag 18  


 


    Lactated Ringers 1,000 ml 120  





    @ 20 mls/hr IV .Q24H ONESIMO   





    Rx#:[id number]   


 


  Intake, IV Titration 14.458  





  Amount   


 


    Insulin Regular 100 unit 14.458  





    In Sodium Chloride 0.9%   





    100 ml @ Per Protocol IV   





    .Q0M ONESIMO Rx#:[id number]   


 


  Oral 500 240 750


 


Output:   


 


  Chest Tube Drainage 770 240 100


 


    Left Pleural Chest Tube 330 100 30


 


    Right Pleural Chest Tube 440 140 70


 


  Urine 140 400 200


 


Other:   


 


  Voiding Method Indwelling Catheter Bedside Commode Bedside Commode


 


  # Voids  1 0








 ABP, PAP, CO, CI - Last Documented











Arterial Blood Pressure        102/52


 


Pulmonary Artery Pressure      39/22


 


Cardiac Output                 5.9


 


Cardiac Index                  2.9

















- Constitutional


General appearance: Present: cooperative, no acute distress, obese





- Respiratory


Details: 





Few scattered crackles throughout, diminished bilateral bases.  Respirations 

are symmetrical and nonlabored.  Oxygen saturation are 93% on 1 L nasal 

cannula.  She is achieving 500-750 mL on her incentive spirometry.  Right and 

left pleural chest tubes remain in place to low continuous wall suction -20 cm 

H2O.  Draining thin serosanguineous drainage.  No air leak is present.  Right 

pleural chest tube drained 280 mL output in the last 24 hours, left pleural 

chest tube drained 160 mL output in the last 24 hours.





- Cardiovascular


Details: 





Regular rhythm and rate.  S1 and S2 present, negative for S3, gallop or murmur.

  Sternum is stable.  Bedside telemetry showing normal sinus rhythm heart rate 

89.  Heart hugger is in place and she is demonstrating appropriate use.  Knee-

high BE hose and sequential compression devices in place to bilateral lower 

extremities.  +1 edema to her bilateral lower extremities.





- Gastrointestinal


Gastrointestinal Comment(s): 





Abdomen soft, nontender and nondistended.  Active bowel sounds to all 4 

abdominal quadrants.  Regarding rigidity.  No organomegaly.  Tolerating oral 

intake.





- Genitourinary


Genitourinary Comment(s): 





Voiding clear yellow urine.





- Integumentary


Integumentary Comment(s): 





Skin is warm and dry.  No clubbing or cyanosis is present.  Midline sternal 

incision is clean, dry and approximated.  No drainage or redness present.  

Bilateral radial artery harvest sites clean, dry and approximated.  No drainage 

or redness present.  Soft, nontender ecchymotic areas to her bilateral forearms.





- Neurologic


Neurologic: Present: CNII-XII intact





- Musculoskeletal


Musculoskeletal: Present: gait normal, generalized weakness, strength equal 

bilaterally





- Psychiatric


Psychiatric: Present: A&O x's 3, appropriate affect, intact judgment & insight





- Allied health notes


Allied health notes reviewed: nursing





- Labs


CBC & Chem 7: 


 01/27/19 05:09





 01/27/19 05:09


Labs: 


 Abnormal Lab Results - Last 24 Hours (Table)











  01/26/19 01/26/19 01/26/19 Range/Units





  12:13 16:49 21:10 


 


WBC     (3.8-10.6)  k/uL


 


RBC     (3.80-5.40)  m/uL


 


Hgb     (11.4-16.0)  gm/dL


 


Hct     (34.0-46.0)  %


 


Chloride     ()  mmol/L


 


Carbon Dioxide     (22-30)  mmol/L


 


BUN     (7-17)  mg/dL


 


Creatinine     (0.52-1.04)  mg/dL


 


Glucose     (74-99)  mg/dL


 


POC Glucose (mg/dL)  130 H  254 H  255 H  (75-99)  mg/dL


 


Phosphorus     (2.5-4.5)  mg/dL


 


Magnesium     (1.6-2.3)  mg/dL


 


AST     (14-36)  U/L


 


Total Protein     (6.3-8.2)  g/dL


 


Albumin     (3.5-5.0)  g/dL














  01/27/19 01/27/19 01/27/19 Range/Units





  05:09 05:09 07:01 


 


WBC   12.7 H   (3.8-10.6)  k/uL


 


RBC   2.73 L   (3.80-5.40)  m/uL


 


Hgb   7.8 L   (11.4-16.0)  gm/dL


 


Hct   25.1 L   (34.0-46.0)  %


 


Chloride  109 H    ()  mmol/L


 


Carbon Dioxide  19 L    (22-30)  mmol/L


 


BUN  45 H    (7-17)  mg/dL


 


Creatinine  1.97 H    (0.52-1.04)  mg/dL


 


Glucose  190 H    (74-99)  mg/dL


 


POC Glucose (mg/dL)    194 H  (75-99)  mg/dL


 


Phosphorus  5.4 H    (2.5-4.5)  mg/dL


 


Magnesium  2.6 H    (1.6-2.3)  mg/dL


 


AST  13 L    (14-36)  U/L


 


Total Protein  5.5 L    (6.3-8.2)  g/dL


 


Albumin  2.7 L    (3.5-5.0)  g/dL














  01/27/19 Range/Units





  11:44 


 


WBC   (3.8-10.6)  k/uL


 


RBC   (3.80-5.40)  m/uL


 


Hgb   (11.4-16.0)  gm/dL


 


Hct   (34.0-46.0)  %


 


Chloride   ()  mmol/L


 


Carbon Dioxide   (22-30)  mmol/L


 


BUN   (7-17)  mg/dL


 


Creatinine   (0.52-1.04)  mg/dL


 


Glucose   (74-99)  mg/dL


 


POC Glucose (mg/dL)  229 H  (75-99)  mg/dL


 


Phosphorus   (2.5-4.5)  mg/dL


 


Magnesium   (1.6-2.3)  mg/dL


 


AST   (14-36)  U/L


 


Total Protein   (6.3-8.2)  g/dL


 


Albumin   (3.5-5.0)  g/dL














- Imaging and Cardiology


Chest x-ray: report reviewed, image reviewed





Assessment and Plan


(1) Status post three vessel coronary artery bypass


Current Visit: Yes   Status: Acute   Code(s): Z95.1 - PRESENCE OF AORTOCORONARY 

BYPASS GRAFT   SNOMED Code(s): 321940710


   





(2) COPD (chronic obstructive pulmonary disease)


Current Visit: Yes   Status: Chronic   Code(s): J44.9 - CHRONIC OBSTRUCTIVE 

PULMONARY DISEASE, UNSPECIFIED   SNOMED Code(s): 74755353


   





(3) Coronary artery disease


Current Visit: Yes   Status: Chronic   Code(s): I25.10 - ATHSCL HEART DISEASE 

OF NATIVE CORONARY ARTERY W/O ANG PCTRS   SNOMED Code(s): 37891935


   





(4) Diabetes mellitus


Current Visit: Yes   Status: Chronic   Code(s): E11.9 - TYPE 2 DIABETES 

MELLITUS WITHOUT COMPLICATIONS   SNOMED Code(s): 60194006


   





(5) History of heart artery stent


Current Visit: Yes   Status: Chronic   Code(s): Z95.5 - PRESENCE OF CORONARY 

ANGIOPLASTY IMPLANT AND GRAFT   SNOMED Code(s): 877259435


   





(6) Hyperlipidemia


Current Visit: Yes   Status: Chronic   Code(s): E78.5 - HYPERLIPIDEMIA, 

UNSPECIFIED   SNOMED Code(s): 62141695


   





(7) Iron deficiency anemia


Current Visit: Yes   Status: Chronic   Code(s): D50.9 - IRON DEFICIENCY ANEMIA, 

UNSPECIFIED   SNOMED Code(s): 50708277


   





(8) Ischemic cardiomyopathy


Current Visit: Yes   Status: Chronic   Code(s): I25.5 - ISCHEMIC CARDIOMYOPATHY

   SNOMED Code(s): 403035566


   





(9) Obesity (BMI 30.0-34.9)


Current Visit: Yes   Status: Chronic   Code(s): E66.9 - OBESITY, UNSPECIFIED   

SNOMED Code(s): 779002329899340


   





(10) Peripheral artery disease


Current Visit: Yes   Status: Chronic   Code(s): I73.9 - PERIPHERAL VASCULAR 

DISEASE, UNSPECIFIED   SNOMED Code(s): 663564133


   





(11) Systolic heart failure


Current Visit: Yes   Status: Chronic   Code(s): I50.20 - UNSPECIFIED SYSTOLIC (

CONGESTIVE) HEART FAILURE   SNOMED Code(s): 655918294


   





(12) Congestive heart failure


Current Visit: No   Status: Acute   Code(s): I50.9 - HEART FAILURE, UNSPECIFIED

   SNOMED Code(s): 07820196


   





(13) History of myocardial infarction, greater than 8 weeks ago


Current Visit: No   Status: Resolved   Code(s): I25.2 - OLD MYOCARDIAL 

INFARCTION   SNOMED Code(s): 5342432


   





(14) Tobacco dependence in remission


Current Visit: No   Status: Resolved   Code(s): F17.201 - NICOTINE DEPENDENCE, 

UNSPECIFIED, IN REMISSION   SNOMED Code(s): 278306608


   


Plan: 





1.  Continue aspirin, statin, Plavix, beta blocker therapy.  Will decrease 

metoprolol tartrate 12.5 mg by mouth twice a day.


2.  We will discontinue her right and left pleural chest tubes.  Right and left 

pleural chest tubes were discontinued without incident.


3.  Continue oral Cardizem for radial artery spasm.  Do not discontinue.


4.  Wean O2 as tolerated.  Encourage incentive spirometry is 10 times every 

hour while awake.


5.  Albumin 5% 500 mL 1 now.  Start dopamine 3 mcg/kg/m renal dose.


6.  Increase activity as tolerated.  PT/OT/cardiac rehab following.


7.  No diuresis today.  


8.  Bronchodilator management per pulmonology.


9.  Pain control with current medication regimen.  No Toradol secondary to 

chronic renal insufficiency.


10.  GI prophylaxis with Protonix, DVT prophylaxis with subcu heparin, SCDs.


11.  Insulin management per primary care service.


12.  Will monitor daily labs and x-rays.  Electrolyte replacement per protocol.


13.  Dietitian to see patient for education regarding weight loss, reduced fat, 

reduced sugar diet.


14.  More recommendations to follow based on patient's clinical course.


 


Time with Patient: Greater than 30

## 2019-01-27 NOTE — P.PN
Subjective


Progress Note Date: 01/27/19


Principal diagnosis: 





Status post open heart with CABG





This is a pleasant 68-year-old  female patient with past medical 

history the patient does have coronary artery disease with prior coronary 

artery stenting, diabetes type 2, hypertension, and dyslipidemia.who we 

consulted to see for cardiac follow-up after coronary artery bypass grafting.





Earlier this month, January 2019, the patient presented to the hospital with 

shortness of breath.  She was ruled in for acute non-ST deviation myocardial 

infarction with abnormal EKG as well as abnormal cardiac enzymes.  At that 

point she underwent heart catheterization by Dr. Thayer and that revealed severe 

triple-vessel coronary artery disease.  Because of that the patient was 

referred to undergo coronary artery bypass grafting.





The patient underwent yesterday coronary artery bypass grafting 3 where she 

received LIMA to LAD, HUDSON to OM, and radial artery to right coronary artery.





This is postoperative patient day #1.  The patient was extubated last night.  

She is doing overall good giving her previous status of severe underlying 

coronary artery disease as well as cardiomyopathy. She has been maintaining 

normal sinus mechanism.  Hemodynamically she is still requiring small doses of 

vasopressors.  Beside that she is on nitroglycerin drip as well as Cardizem 

drip for the radial bypasses.  The Cardizem drip is going to come off later on 

today as well as a nitro drip.  The patient will be started on calcium channel 

blocker by mouth either with Norvasc or with Cardizem by mouth.  Beside that 

she continues to be on dual antiplatelet therapy with aspirin and Plavix as 

well as she is on statin and also she is on metoprolol by mouth.





The last echocardiogram from January 2019 revealed impaired LV function with EF 

between 20-25% with basal inferior hypokinesia and inferoseptal hypokinesia.





On follow-up with the patient today, she is doing better.  She is not on any 

vasopressors at this point.  She continues to be on dual antiplatelet therapy 

along with a statin as well as Cardizem by mouth.  The hemoglobin this morning 

is 7.8 and the creatinine is 1.97.  Overall the patient is doing better, she 

was sitting in her bed and doesn't look in any pain or any distress.








Objective





- Vital Signs


Vital signs: 


 Vital Signs











Temp  99.0 F   01/27/19 04:00


 


Pulse  89   01/27/19 06:00


 


Resp  10 L  01/27/19 06:00


 


BP  83/64   01/27/19 06:00


 


Pulse Ox  96   01/27/19 06:00








 Intake & Output











 01/26/19 01/27/19 01/27/19





 18:59 06:59 18:59


 


Intake Total 652.458 240 


 


Output Total 910 640 


 


Balance -257.542 -400 


 


Intake:   


 


    


 


    .9NS Pressure Bag 18  


 


    Lactated Ringers 1,000 ml 120  





    @ 20 mls/hr IV .Q24H ONESIMO   





    Rx#:[id number]   


 


  Intake, IV Titration 14.458  





  Amount   


 


    Insulin Regular 100 unit 14.458  





    In Sodium Chloride 0.9%   





    100 ml @ Per Protocol IV   





    .Q0M ONESIMO Rx#:[id number]   


 


  Oral 500 240 


 


Output:   


 


  Chest Tube Drainage 770 240 


 


    Left Pleural Chest Tube 330 100 


 


    Right Pleural Chest Tube 440 140 


 


  Urine 140 400 


 


Other:   


 


  Voiding Method Indwelling Catheter Bedside Commode 


 


  # Voids  1 








 ABP, PAP, CO, CI - Last Documented











Arterial Blood Pressure        102/52


 


Pulmonary Artery Pressure      39/22


 


Cardiac Output                 5.9


 


Cardiac Index                  2.9

















- Constitutional


General appearance: Present: no acute distress





- Respiratory


Respiratory: bilateral: diminished





- Cardiovascular


Rhythm: regular





- Labs


CBC & Chem 7: 


 01/27/19 05:09





 01/27/19 05:09


Labs: 


 Abnormal Lab Results - Last 24 Hours (Table)











  01/26/19 01/26/19 01/26/19 Range/Units





  08:20 10:29 12:13 


 


WBC     (3.8-10.6)  k/uL


 


RBC     (3.80-5.40)  m/uL


 


Hgb     (11.4-16.0)  gm/dL


 


Hct     (34.0-46.0)  %


 


Chloride     ()  mmol/L


 


Carbon Dioxide     (22-30)  mmol/L


 


BUN     (7-17)  mg/dL


 


Creatinine     (0.52-1.04)  mg/dL


 


Glucose     (74-99)  mg/dL


 


POC Glucose (mg/dL)  197 H  167 H  130 H  (75-99)  mg/dL


 


Phosphorus     (2.5-4.5)  mg/dL


 


Magnesium     (1.6-2.3)  mg/dL


 


AST     (14-36)  U/L


 


Total Protein     (6.3-8.2)  g/dL


 


Albumin     (3.5-5.0)  g/dL














  01/26/19 01/26/19 01/27/19 Range/Units





  16:49 21:10 05:09 


 


WBC     (3.8-10.6)  k/uL


 


RBC     (3.80-5.40)  m/uL


 


Hgb     (11.4-16.0)  gm/dL


 


Hct     (34.0-46.0)  %


 


Chloride    109 H  ()  mmol/L


 


Carbon Dioxide    19 L  (22-30)  mmol/L


 


BUN    45 H  (7-17)  mg/dL


 


Creatinine    1.97 H  (0.52-1.04)  mg/dL


 


Glucose    190 H  (74-99)  mg/dL


 


POC Glucose (mg/dL)  254 H  255 H   (75-99)  mg/dL


 


Phosphorus    5.4 H  (2.5-4.5)  mg/dL


 


Magnesium    2.6 H  (1.6-2.3)  mg/dL


 


AST    13 L  (14-36)  U/L


 


Total Protein    5.5 L  (6.3-8.2)  g/dL


 


Albumin    2.7 L  (3.5-5.0)  g/dL














  01/27/19 01/27/19 Range/Units





  05:09 07:01 


 


WBC  12.7 H   (3.8-10.6)  k/uL


 


RBC  2.73 L   (3.80-5.40)  m/uL


 


Hgb  7.8 L   (11.4-16.0)  gm/dL


 


Hct  25.1 L   (34.0-46.0)  %


 


Chloride    ()  mmol/L


 


Carbon Dioxide    (22-30)  mmol/L


 


BUN    (7-17)  mg/dL


 


Creatinine    (0.52-1.04)  mg/dL


 


Glucose    (74-99)  mg/dL


 


POC Glucose (mg/dL)   194 H  (75-99)  mg/dL


 


Phosphorus    (2.5-4.5)  mg/dL


 


Magnesium    (1.6-2.3)  mg/dL


 


AST    (14-36)  U/L


 


Total Protein    (6.3-8.2)  g/dL


 


Albumin    (3.5-5.0)  g/dL














Assessment and Plan


Assessment: 





Assessment


#1 severe underlying coronary artery disease as described above


#2 status post coronary artery bypass grafting 3 as described above


#3 diabetes type 2


#4 hypertension


#5 dyslipidemia





Plan


#1 continue the current medical regimen including dual antiplatelet therapy 

along with statin and metoprolol


#2 continue monitor the hemoglobin as well as BUN and electrolytes


#3 continue daily chest x-ray


#4 follow-up with the patient.








Thank you for allowing us participate in her care and we will continue 

following up with the patient

## 2019-01-27 NOTE — P.PN
Subjective


Progress Note Date: 01/27/19


Principal diagnosis: 





Status post CABG, postoperative day #3





Patient was seen today on 1/25/2019, she is postoperative day #1, off pump 

coronary artery bypass graft surgery 3, LIMA to LAD, right radial artery graft 

to the obtuse marginal and left radial arterial graft to the posterior 

descending artery, bilateral endovascular radial artery harvesting.  Patient 

was extubated uneventfully yesterday shortly after she was seen in the 

intensive care unit.  She tolerated the extubation well, and she is now on few 

liters nasal cannula.  Patient is relatively asymptomatic, she was maintained 

on Cardizem IV nitro IV dopamine overnight, she is hemodynamically stable, and 

denies any specific complaints today.  Chest x-ray is suggestive of mild 

postoperative interstitial edema.  And bibasilar atelectasis, expected 

postoperatively all labs were reviewed, hemoglobin is 8.0 today.  Renal profile 

is about baseline, creatinine is 1.25.  It was 1.33 yesterday.





Patient was reevaluated today on 1/26/2019, postoperative day #2.  Patient is 

doing relatively well, she denies any shortness of breath cough or wheezing.  

Remains on few liters nasal cannula.  She is hemodynamically stable.  Not 

requiring any pressors.  Hemoglobin is 7.6 today, and her creatinine is about 

her baseline 1.38.  Patient is doing fairly well with incentive spirometry, 

roughly about 700 mL.





Patient was reevaluated today on 1/27/2019, she is postoperative day #3.  

Patient is doing fairly well, relatively asymptomatic, she has some discomfort 

from the chest tubes in place.  And these will likely be discontinued today.  

Patient is not requiring any pressors, she continues on the dual antiplatelet 

therapy with statin and Cardizem by mouth.  Her labs were reviewed, creatinine 

is slightly higher today, her baseline is about 1.30, and today is 1.97.  May 

likely benefit from more fluids.  Chest x-ray showed mostly heart is enlarged, 

bibasilar atelectasis is noted mostly on the left.  And small pleural effusions 

noted.





Objective





- Vital Signs


Vital signs: 


 Vital Signs











Temp  97.2 F L  01/27/19 08:00


 


Pulse  90   01/27/19 09:00


 


Resp  45 H  01/27/19 09:00


 


BP  104/62   01/27/19 09:00


 


Pulse Ox  91 L  01/27/19 09:00








 Intake & Output











 01/26/19 01/27/19 01/27/19





 18:59 06:59 18:59


 


Intake Total 652.458 240 750


 


Output Total 910 640 100


 


Balance -257.542 -400 650


 


Intake:   


 


    


 


    .9NS Pressure Bag 18  


 


    Lactated Ringers 1,000 ml 120  





    @ 20 mls/hr IV .Q24H ONESIMO   





    Rx#:[id number]   


 


  Intake, IV Titration 14.458  





  Amount   


 


    Insulin Regular 100 unit 14.458  





    In Sodium Chloride 0.9%   





    100 ml @ Per Protocol IV   





    .Q0M ONESIMO Rx#:[id number]   


 


  Oral 500 240 750


 


Output:   


 


  Chest Tube Drainage 770 240 100


 


    Left Pleural Chest Tube 330 100 30


 


    Right Pleural Chest Tube 440 140 70


 


  Urine 140 400 0


 


Other:   


 


  Voiding Method Indwelling Catheter Bedside Commode Bedside Commode


 


  # Voids  1 0








 ABP, PAP, CO, CI - Last Documented











Arterial Blood Pressure        102/52


 


Pulmonary Artery Pressure      39/22


 


Cardiac Output                 5.9


 


Cardiac Index                  2.9

















- Exam





Physical Exam: Revealed a 68-year-old female, pleasant, in no distress.  On 3 L 

nasal cannula.


Head: Atraumatic normocephalic.


Lymphatics: No lymphadenopathy.


HEENT:[Neck is supple.] [No neck masses.] [No thyromegaly.] [No JVD.]


Chest: [Minimal fine crackles at the bases.  No rhonchi no wheezes, chest tubes 

were noted.


Cardiac Exam: [Normal S1 and S2, no S3 gallop, no murmur.]


Abdomen: [Soft, nontender,  no megaly, no rebound, no guarding, normal bowel 

sounds.]


Extremities: [No clubbing, trace of bipedal edema, no cyanosis.]


Neurological Exam: [No focal neurologic deficit.


Normal mood, affect and mental status examination.]





- Labs


CBC & Chem 7: 


 01/27/19 05:09





 01/27/19 05:09


Labs: 


 Abnormal Lab Results - Last 24 Hours (Table)











  01/26/19 01/26/19 01/26/19 Range/Units





  10:29 12:13 16:49 


 


WBC     (3.8-10.6)  k/uL


 


RBC     (3.80-5.40)  m/uL


 


Hgb     (11.4-16.0)  gm/dL


 


Hct     (34.0-46.0)  %


 


Chloride     ()  mmol/L


 


Carbon Dioxide     (22-30)  mmol/L


 


BUN     (7-17)  mg/dL


 


Creatinine     (0.52-1.04)  mg/dL


 


Glucose     (74-99)  mg/dL


 


POC Glucose (mg/dL)  167 H  130 H  254 H  (75-99)  mg/dL


 


Phosphorus     (2.5-4.5)  mg/dL


 


Magnesium     (1.6-2.3)  mg/dL


 


AST     (14-36)  U/L


 


Total Protein     (6.3-8.2)  g/dL


 


Albumin     (3.5-5.0)  g/dL














  01/26/19 01/27/19 01/27/19 Range/Units





  21:10 05:09 05:09 


 


WBC    12.7 H  (3.8-10.6)  k/uL


 


RBC    2.73 L  (3.80-5.40)  m/uL


 


Hgb    7.8 L  (11.4-16.0)  gm/dL


 


Hct    25.1 L  (34.0-46.0)  %


 


Chloride   109 H   ()  mmol/L


 


Carbon Dioxide   19 L   (22-30)  mmol/L


 


BUN   45 H   (7-17)  mg/dL


 


Creatinine   1.97 H   (0.52-1.04)  mg/dL


 


Glucose   190 H   (74-99)  mg/dL


 


POC Glucose (mg/dL)  255 H    (75-99)  mg/dL


 


Phosphorus   5.4 H   (2.5-4.5)  mg/dL


 


Magnesium   2.6 H   (1.6-2.3)  mg/dL


 


AST   13 L   (14-36)  U/L


 


Total Protein   5.5 L   (6.3-8.2)  g/dL


 


Albumin   2.7 L   (3.5-5.0)  g/dL














  01/27/19 Range/Units





  07:01 


 


WBC   (3.8-10.6)  k/uL


 


RBC   (3.80-5.40)  m/uL


 


Hgb   (11.4-16.0)  gm/dL


 


Hct   (34.0-46.0)  %


 


Chloride   ()  mmol/L


 


Carbon Dioxide   (22-30)  mmol/L


 


BUN   (7-17)  mg/dL


 


Creatinine   (0.52-1.04)  mg/dL


 


Glucose   (74-99)  mg/dL


 


POC Glucose (mg/dL)  194 H  (75-99)  mg/dL


 


Phosphorus   (2.5-4.5)  mg/dL


 


Magnesium   (1.6-2.3)  mg/dL


 


AST   (14-36)  U/L


 


Total Protein   (6.3-8.2)  g/dL


 


Albumin   (3.5-5.0)  g/dL














Assessment and Plan


Assessment: 





Impression:





1 coronary artery disease, status post CABG, postoperative day #3





2 status post three-vessel coronary artery bypass surgery.





3 ischemic cardiomyopathy and LV dysfunction





4 history of chronic systolic heart failure.





5 history of COPD





6 postoperative atelectasis and small pleural effusions noted, expected 

findings after surgery.





6 multiple comorbidities: history of diabetes, hyperlipidemia, iron deficiency 

anemia, peripheral vessel occlusive disease involving lower extremities, 

history of previous MI, and history of tobacco dependence presently in 

remission.





Recommendation: Continue all cardiac meds including beta blockers Plavix statin 

and aspirin.  Continue Cardizem orally  Continue to titrate O2 as tolerated.  

continue to encourage incentive spirometry, continue bronchodilators, ambulate 

today, continue glucose management with insulin as per protocol, monitor x-rays 

on a daily basis, patient will remain in the ICU today, continue to monitor 

renal profile, and if gets any worse, consider nephrology evaluation.  Patient 

will still require further stay in the ICU, and we will continue to follow.


Time with Patient: Less than 30

## 2019-01-28 LAB
ALBUMIN SERPL-MCNC: 2.7 G/DL (ref 3.5–5)
ALP SERPL-CCNC: 92 U/L (ref 38–126)
ALT SERPL-CCNC: 26 U/L (ref 9–52)
ANION GAP SERPL CALC-SCNC: 7 MMOL/L
AST SERPL-CCNC: 14 U/L (ref 14–36)
BASOPHILS # BLD AUTO: 0 K/UL (ref 0–0.2)
BASOPHILS NFR BLD AUTO: 0 %
BUN SERPL-SCNC: 53 MG/DL (ref 7–17)
CALCIUM SPEC-MCNC: 8.5 MG/DL (ref 8.4–10.2)
CHLORIDE SERPL-SCNC: 104 MMOL/L (ref 98–107)
CO2 SERPL-SCNC: 22 MMOL/L (ref 22–30)
EOSINOPHIL # BLD AUTO: 0.3 K/UL (ref 0–0.7)
EOSINOPHIL NFR BLD AUTO: 3 %
ERYTHROCYTE [DISTWIDTH] IN BLOOD BY AUTOMATED COUNT: 2.6 M/UL (ref 3.8–5.4)
ERYTHROCYTE [DISTWIDTH] IN BLOOD: 15.3 % (ref 11.5–15.5)
GLUCOSE BLD-MCNC: 141 MG/DL (ref 75–99)
GLUCOSE BLD-MCNC: 165 MG/DL (ref 75–99)
GLUCOSE BLD-MCNC: 173 MG/DL (ref 75–99)
GLUCOSE BLD-MCNC: 211 MG/DL (ref 75–99)
GLUCOSE SERPL-MCNC: 133 MG/DL (ref 74–99)
HCT VFR BLD AUTO: 23.6 % (ref 34–46)
HGB BLD-MCNC: 7.2 GM/DL (ref 11.4–16)
LYMPHOCYTES # SPEC AUTO: 0.8 K/UL (ref 1–4.8)
LYMPHOCYTES NFR SPEC AUTO: 8 %
MAGNESIUM SPEC-SCNC: 3.2 MG/DL (ref 1.6–2.3)
MCH RBC QN AUTO: 27.7 PG (ref 25–35)
MCHC RBC AUTO-ENTMCNC: 30.5 G/DL (ref 31–37)
MCV RBC AUTO: 90.8 FL (ref 80–100)
MONOCYTES # BLD AUTO: 0.7 K/UL (ref 0–1)
MONOCYTES NFR BLD AUTO: 7 %
NEUTROPHILS # BLD AUTO: 7.4 K/UL (ref 1.3–7.7)
NEUTROPHILS NFR BLD AUTO: 78 %
PLATELET # BLD AUTO: 281 K/UL (ref 150–450)
POTASSIUM SERPL-SCNC: 5 MMOL/L (ref 3.5–5.1)
PROT SERPL-MCNC: 5.4 G/DL (ref 6.3–8.2)
SODIUM SERPL-SCNC: 133 MMOL/L (ref 137–145)
WBC # BLD AUTO: 9.4 K/UL (ref 3.8–10.6)

## 2019-01-28 RX ADMIN — METOPROLOL TARTRATE SCH MG: 25 TABLET, FILM COATED ORAL at 08:53

## 2019-01-28 RX ADMIN — DOCUSATE SODIUM AND SENNOSIDES SCH EACH: 50; 8.6 TABLET ORAL at 20:46

## 2019-01-28 RX ADMIN — INSULIN ASPART SCH UNIT: 100 INJECTION, SOLUTION INTRAVENOUS; SUBCUTANEOUS at 17:06

## 2019-01-28 RX ADMIN — SODIUM CHLORIDE, PRESERVATIVE FREE SCH ML: 5 INJECTION INTRAVENOUS at 20:48

## 2019-01-28 RX ADMIN — HYDROCODONE BITARTRATE AND ACETAMINOPHEN PRN EACH: 5; 325 TABLET ORAL at 20:47

## 2019-01-28 RX ADMIN — SODIUM CHLORIDE, PRESERVATIVE FREE SCH ML: 5 INJECTION INTRAVENOUS at 08:54

## 2019-01-28 RX ADMIN — ASPIRIN 325 MG ORAL TABLET SCH MG: 325 PILL ORAL at 08:53

## 2019-01-28 RX ADMIN — INSULIN ASPART SCH UNIT: 100 INJECTION, SOLUTION INTRAVENOUS; SUBCUTANEOUS at 07:35

## 2019-01-28 RX ADMIN — INSULIN ASPART SCH UNIT: 100 INJECTION, SOLUTION INTRAVENOUS; SUBCUTANEOUS at 12:14

## 2019-01-28 RX ADMIN — IPRATROPIUM BROMIDE AND ALBUTEROL SULFATE SCH ML: .5; 3 SOLUTION RESPIRATORY (INHALATION) at 19:57

## 2019-01-28 RX ADMIN — PANTOPRAZOLE SODIUM SCH MG: 40 TABLET, DELAYED RELEASE ORAL at 08:53

## 2019-01-28 RX ADMIN — IPRATROPIUM BROMIDE AND ALBUTEROL SULFATE SCH ML: .5; 3 SOLUTION RESPIRATORY (INHALATION) at 15:55

## 2019-01-28 RX ADMIN — DILTIAZEM HYDROCHLORIDE SCH MG: 30 TABLET, FILM COATED ORAL at 00:13

## 2019-01-28 RX ADMIN — CLOPIDOGREL BISULFATE SCH MG: 75 TABLET ORAL at 08:53

## 2019-01-28 RX ADMIN — ATORVASTATIN CALCIUM SCH MG: 40 TABLET, FILM COATED ORAL at 08:54

## 2019-01-28 RX ADMIN — INSULIN DETEMIR SCH UNIT: 100 INJECTION, SOLUTION SUBCUTANEOUS at 21:27

## 2019-01-28 RX ADMIN — DILTIAZEM HYDROCHLORIDE SCH MG: 30 TABLET, FILM COATED ORAL at 06:27

## 2019-01-28 RX ADMIN — INSULIN ASPART SCH UNIT: 100 INJECTION, SOLUTION INTRAVENOUS; SUBCUTANEOUS at 20:46

## 2019-01-28 RX ADMIN — HEPARIN SODIUM SCH UNIT: 5000 INJECTION, SOLUTION INTRAVENOUS; SUBCUTANEOUS at 00:13

## 2019-01-28 RX ADMIN — HEPARIN SODIUM SCH UNIT: 5000 INJECTION, SOLUTION INTRAVENOUS; SUBCUTANEOUS at 16:57

## 2019-01-28 RX ADMIN — IPRATROPIUM BROMIDE AND ALBUTEROL SULFATE SCH ML: .5; 3 SOLUTION RESPIRATORY (INHALATION) at 06:50

## 2019-01-28 RX ADMIN — DILTIAZEM HYDROCHLORIDE SCH MG: 30 TABLET, FILM COATED ORAL at 16:57

## 2019-01-28 RX ADMIN — MAGNESIUM HYDROXIDE PRN MG: 2400 SUSPENSION ORAL at 20:47

## 2019-01-28 RX ADMIN — METOPROLOL TARTRATE SCH MG: 25 TABLET, FILM COATED ORAL at 20:46

## 2019-01-28 RX ADMIN — IPRATROPIUM BROMIDE AND ALBUTEROL SULFATE SCH ML: .5; 3 SOLUTION RESPIRATORY (INHALATION) at 11:09

## 2019-01-28 RX ADMIN — POTASSIUM CHLORIDE SCH MLS/HR: 14.9 INJECTION, SOLUTION INTRAVENOUS at 09:29

## 2019-01-28 RX ADMIN — HEPARIN SODIUM SCH UNIT: 5000 INJECTION, SOLUTION INTRAVENOUS; SUBCUTANEOUS at 08:54

## 2019-01-28 NOTE — P.PN
Subjective


Progress Note Date: 01/28/19


Principal diagnosis: 





Coronary artery disease, chronic systolic and diastolic heart failure with 

preoperative ejection fraction 20-25%, ischemic cardiomyopathy and LV 

dysfunction.  Previous medical history of non-STEMI earlier this month as well 

as in 2006 with stent placement at that time, hyperlipidemia, peripheral artery 

disease with stent placement to the left lower extremity in 2016, chronic 

kidney disease with baseline creatinine 1.3-1.5, recent vein surgery to 

bilateral lower extremity is, non-insulin-dependent diabetes mellitus with 

preoperative hemoglobin A1c 7.1%, shingles, obesity, moderate COPD with 

preoperative FEV1 51% of predicted, previous tobacco dependence, and family 

history of early coronary artery disease with mother dying before the age of 60 

years old during coronary artery bypass graft surgery.  Preoperative nasal swab 

positive for MSSA.  Preoperative normocytic, normochromic anemia.





POD #4 off-pump coronary artery bypass graft surgery 3 with the left internal 

mammary artery artery graft to the left anterior descending artery, right 

radial artery graft to the obtuse marginal and the left radial arterial graft 

to the posterior descending artery, bilateral endovascular radial artery 

harvest.  Intraoperative transesophageal echocardiogram by anesthesia.





Postoperative acute blood loss anemia, expected outcome.





She is currently sitting up in the recliner in no acute distress.  Denies any 

pain at this time, states pain is well controlled on current medication 

regimen.  Denies shortness of breath.  Has ambulated in the hallway in the 

intensive care unit, although not since yesterday morning as she had a lot of 

nausea related to her lunch yesterday.  She was started on low-dose dopamine 

yesterday secondary to hypotension and reduced urine output, this morning her 

heart rate is starting to increase.  Clinically she looks very good in states 

she is feeling better every day, no new complaints.





Objective





- Vital Signs


Vital signs: 


 Vital Signs











Temp  97 F L  01/28/19 08:00


 


Pulse  92   01/28/19 09:00


 


Resp  14   01/28/19 09:00


 


BP  111/62   01/28/19 09:00


 


Pulse Ox  95   01/28/19 09:00








 Intake & Output











 01/27/19 01/28/19 01/28/19





 18:59 06:59 18:59


 


Intake Total 2187.28 240.91 167.67


 


Output Total 925 700 400


 


Balance 1262.28 -459.09 -232.33


 


Weight  95.8 kg 


 


Intake:   


 


  IV  110 47.67


 


    DOPamine DRIP 800 mg In  110 10





    Dextrose/Water 1 500ml.   





    bag @ 3 MCG/KG/MIN 10.22   





    mls/hr IV .Q24H ONESIMO Rx#:   





    [id number]   


 


    Dextrose/Water 1 500ml.   27.67





    bag @ 2 MCG/KG/MIN 7.27   





    mls/hr IV .Q24H ONESIMO with   





    DOPamine DRIP 800 mg Rx#:   





    [id number]   


 


    Lactated Ringers 1,000 ml   10





    @ 20 mls/hr IV .Q24H ONESIMO   





    Rx#:[id number]   


 


  Intake, IV Titration 587.28 10.91 





  Amount   


 


    Albumin Human 5% 500 ml 500  





    In Empty Bag 1 bag @ 250   





    mls/hr IVPB ONCE ONE Rx#:   





    [id number]   


 


    Dextrose/Water 1 500ml. 87.28 10.91 





    bag @ 2 MCG/KG/MIN 7.27   





    mls/hr IV .Q24H ONESIMO with   





    DOPamine DRIP 800 mg Rx#:   





    [id number]   


 


  Oral 1600 120 120


 


Output:   


 


  Chest Tube Drainage 100  


 


    Left Pleural Chest Tube 30  


 


    Right Pleural Chest Tube 70  


 


  Urine 825 700 400


 


Other:   


 


  Voiding Method Bedside Commode Bedside Commode 


 


  # Voids 0 1 








 ABP, PAP, CO, CI - Last Documented











Arterial Blood Pressure        102/52


 


Pulmonary Artery Pressure      39/22


 


Cardiac Output                 5.9


 


Cardiac Index                  2.9

















- Constitutional


General appearance: Present: cooperative, no acute distress, obese





- Respiratory


Details: 





coarse breath sounds in the bases.  Respirations even, nonlabored.  Currently 

on 3 L nasal cannula with oxygen saturation 92-94%.  Able to achieve 750 mL on 

her incentive spirometry.  Strong productive cough.





- Cardiovascular


Details: 


S1, S2 present.  Regular rate and rhythm, sinus rhythm on telemetry.   Sternum 

stable.  Palpable peripheral pulses bilaterally.  Trace generalized edema 

present.  No calf pain or tenderness noted.  Heart hugger in place with patient 

demonstrating appropriate use.  Antiembolism stockings, SCDs present.  





- Gastrointestinal


Gastrointestinal Comment(s): 





Abdomen soft, nontender, nondistended.  Active bowel sounds present 4 

quadrants.  Tolerating diet.  Positive flatus, negative bowel movement.





- Genitourinary


Genitourinary Comment(s): 





Continues to void clear, yellow urine.  Output overnight was 600 mL.





- Integumentary


Integumentary Comment(s): 





Skin is warm and dry with evidence of good perfusion.  Anterior chest incision 

well approximated and covered with dry intact dressing.  Bilateral radial 

artery harvest sites well approximated, positive feeling without numbness or 

tingling in all fingers, able to move both hands without difficulty.





- Neurologic


Neurologic: Present: CNII-XII intact





- Musculoskeletal


Musculoskeletal: Present: gait normal, strength equal bilaterally





- Psychiatric


Psychiatric: Present: A&O x's 3, appropriate affect, intact judgment & insight





- Allied health notes


Allied health notes reviewed: nursing





- Labs


CBC & Chem 7: 


 01/28/19 04:39





 01/28/19 04:39


Labs: 


 Abnormal Lab Results - Last 24 Hours (Table)











  01/27/19 01/27/19 01/27/19 Range/Units





  11:44 17:03 21:46 


 


RBC     (3.80-5.40)  m/uL


 


Hgb     (11.4-16.0)  gm/dL


 


Hct     (34.0-46.0)  %


 


MCHC     (31.0-37.0)  g/dL


 


Lymphocytes #     (1.0-4.8)  k/uL


 


Sodium     (137-145)  mmol/L


 


BUN     (7-17)  mg/dL


 


Creatinine     (0.52-1.04)  mg/dL


 


Glucose     (74-99)  mg/dL


 


POC Glucose (mg/dL)  229 H  207 H  193 H  (75-99)  mg/dL


 


Phosphorus     (2.5-4.5)  mg/dL


 


Magnesium     (1.6-2.3)  mg/dL


 


Total Protein     (6.3-8.2)  g/dL


 


Albumin     (3.5-5.0)  g/dL














  01/28/19 01/28/19 01/28/19 Range/Units





  04:39 04:39 07:08 


 


RBC  2.60 L    (3.80-5.40)  m/uL


 


Hgb  7.2 L    (11.4-16.0)  gm/dL


 


Hct  23.6 L    (34.0-46.0)  %


 


MCHC  30.5 L    (31.0-37.0)  g/dL


 


Lymphocytes #  0.8 L    (1.0-4.8)  k/uL


 


Sodium   133 L   (137-145)  mmol/L


 


BUN   53 H   (7-17)  mg/dL


 


Creatinine   2.00 H   (0.52-1.04)  mg/dL


 


Glucose   133 H   (74-99)  mg/dL


 


POC Glucose (mg/dL)    141 H  (75-99)  mg/dL


 


Phosphorus   5.1 H   (2.5-4.5)  mg/dL


 


Magnesium   3.2 H   (1.6-2.3)  mg/dL


 


Total Protein   5.4 L   (6.3-8.2)  g/dL


 


Albumin   2.7 L   (3.5-5.0)  g/dL














- Imaging and Cardiology


Chest x-ray: report reviewed, image reviewed





Assessment and Plan


(1) Coronary artery disease


Current Visit: Yes   Status: Chronic   Code(s): I25.10 - ATHSCL HEART DISEASE 

OF NATIVE CORONARY ARTERY W/O ANG PCTRS   SNOMED Code(s): 10314776


   





(2) Ischemic cardiomyopathy


Current Visit: Yes   Status: Chronic   Code(s): I25.5 - ISCHEMIC CARDIOMYOPATHY

   SNOMED Code(s): 475214998


   





(3) Status post three vessel coronary artery bypass


Current Visit: Yes   Status: Acute   Code(s): Z95.1 - PRESENCE OF AORTOCORONARY 

BYPASS GRAFT   SNOMED Code(s): 237056831


   





(4) Systolic heart failure


Current Visit: Yes   Status: Chronic   Code(s): I50.20 - UNSPECIFIED SYSTOLIC (

CONGESTIVE) HEART FAILURE   SNOMED Code(s): 296764705


   





(5) COPD (chronic obstructive pulmonary disease)


Current Visit: Yes   Status: Chronic   Code(s): J44.9 - CHRONIC OBSTRUCTIVE 

PULMONARY DISEASE, UNSPECIFIED   SNOMED Code(s): 74119546


   





(6) Diabetes mellitus


Current Visit: Yes   Status: Chronic   Code(s): E11.9 - TYPE 2 DIABETES 

MELLITUS WITHOUT COMPLICATIONS   SNOMED Code(s): 58601885


   





(7) History of heart artery stent


Current Visit: Yes   Status: Chronic   Code(s): Z95.5 - PRESENCE OF CORONARY 

ANGIOPLASTY IMPLANT AND GRAFT   SNOMED Code(s): 168244934


   





(8) Hyperlipidemia


Current Visit: Yes   Status: Chronic   Code(s): E78.5 - HYPERLIPIDEMIA, 

UNSPECIFIED   SNOMED Code(s): 25089429


   





(9) Iron deficiency anemia


Current Visit: Yes   Status: Chronic   Code(s): D50.9 - IRON DEFICIENCY ANEMIA, 

UNSPECIFIED   SNOMED Code(s): 27573461


   





(10) Obesity (BMI 30.0-34.9)


Current Visit: Yes   Status: Chronic   Code(s): E66.9 - OBESITY, UNSPECIFIED   

SNOMED Code(s): 300509214920245


   





(11) Peripheral artery disease


Current Visit: Yes   Status: Chronic   Code(s): I73.9 - PERIPHERAL VASCULAR 

DISEASE, UNSPECIFIED   SNOMED Code(s): 622596004


   





(12) History of myocardial infarction, greater than 8 weeks ago


Current Visit: No   Status: Resolved   Code(s): I25.2 - OLD MYOCARDIAL 

INFARCTION   SNOMED Code(s): 1031489


   





(13) Tobacco dependence in remission


Current Visit: No   Status: Resolved   Code(s): F17.201 - NICOTINE DEPENDENCE, 

UNSPECIFIED, IN REMISSION   SNOMED Code(s): 889893068


   


Plan: 





1.  Continue aspirin, statin, Plavix, beta blocker therapy.  Will increase beta 

blocker therapy as tolerated.


2.  Will decrease dopamine to 2 mics.  Nephrology consulted secondary to 

increasing BUN and creatinine, will defer to their recommendations for 

diuresis.  No nephrotoxic agents.


3.  Continue oral Cardizem for radial artery spasm.  Decrease to 3 times daily 

today per cardiology recommendations.


4.  Wean O2 as tolerated.  Encourage incentive spirometry is 10 times every 

hour while awake.


5.  Increase activity, ambulate as tolerated.  PT/OT/cardiac rehab following.


6.  Pain control with current medication regimen.  No Toradol secondary to 

chronic renal insufficiency.


7.  Bronchodilators per pulmonology.


8.  Insulin management per primary care service.


9.  GI prophylaxis with Protonix, DVT prophylaxis with subcu heparin, SCDs.


10.  Will monitor daily labs and x-rays.  Electrolyte replacement per protocol.


11.  Dietitian to see patient for education regarding weight loss, reduced fat, 

reduced sugar diet.


12.  More recommendations to follow.


Time with Patient: Greater than 30

## 2019-01-28 NOTE — PN
PROGRESS NOTE



DATE OF SERVICE:

01/27/2019



PRESENTING COMPLAINT:

CABG.



INTERVAL HISTORY:

Patient is status post coronary artery bypass, sitting up in a chair, though a bit

tired, wearing nasal cannula.  Did tolerate little bit.  Chest tubes were taken out

today.  Remains in sinus rhythm.



REVIEW OF SYSTEMS:

Done for constitutional, cardiovascular, GI, pulmonary; relevant findings as above.



CURRENT MEDICATIONS:

Reviewed.



PHYSICAL EXAMINATION:

VITAL SIGNS: Temperature 98.2, pulse 98, respiration 16, blood pressure 92/52, pulse ox

94 percent on 3 L.

GENERAL APPEARANCE:  Lying in bed, tired-appearing.

EYES: Pupils equal. Conjunctivae normal.

NECK:  JVD unable to assess. Mass not palpable.

RESPIRATORY:  Effort increased.

LUNGS:  Decreased breath sounds.

CARDIOVASCULAR: First and second sounds.  Some edema.

ABDOMEN:  Soft, nontender.  Liver and spleen not palpable.

PSYCHIATRY: Answering questions though tired-appearing.



INVESTIGATIONS:

White count 12.7, hemoglobin 7.8, potassium 4.9, bicarb 19, BUN 45, creatinine 1.97.



ASSESSMENT:

1. Status post triple vessel coronary artery bypass.

2. Chronic congestive heart failure from systolic dysfunction ejection fraction 20% to

    25% from underlying coronary artery disease.

3. Acute non-Q-wave MI infarction last admission.

4. Peripheral artery disease.

5. Chronic urine incontinence.

6. Diabetes mellitus type 2 on oral hypoglycemics.  Currently on sliding scale.

7. Chronic kidney stage 3 from diabetic nephropathy and nephrosclerosis.

8. Acute renal failure probably prerenal component.

9. Acute postoperative blood loss anemia expected from surgery.

10.Metabolic acidosis renal failure.

11.Hypoalbuminemia as an acute phase reactant.



PLAN:

Keep a close eye on patient's hemodynamics.  Levemir is being added.  Keep a close eye

on renal function.  If renal function deteriorates, may have to get Nephrology

involved.





MMODL / IJN: [id number] / Job#: [id number]

## 2019-01-28 NOTE — PN
PROGRESS NOTE



DATE OF SERVICE:

January 28, 2019.



This is a 68-year-old female who was admitted on the 24th.  She is status post 3-vessel

bypass grafting.  She is postop day #4.  The patient also has a history of coronary

artery disease, ischemic cardiomyopathy with LV dysfunction, systolic heart failure,

COPD and some postoperative atelectasis.  In addition, she suffers from diabetes

mellitus, hyperlipidemia, iron deficiency anemia, peripheral vascular occlusive

disease, previous myocardial infarction, and a previous history of tobacco dependence.



She smoked 40 years at least a pack a day.



Currently, she is doing reasonably well.  She has an O2 at 2 L.  She is getting IV

dopamine at 3 mcg/kg per minute for renal perfusion.



I did tell Cardiothoracic Surgery that probably it is of no benefit in my opinion.



Anyway, the patient's urine output has not improved.



The patient is relatively stable.  She is alert, oriented.  Not having any respiratory

difficulty or distress.  Apparently, FEV1 was 56% of predicted according to Maria Teresa from

Cardiothoracic Surgery, making her have stage II chronic lung disease.



Current vital signs are reviewed. Temperature 97, heart rate 89, respiratory rate 14,

blood pressure 111/62, mean 78 and 2 L saturation 95%.  Appears in no acute distress.

HEENT examination is grossly unremarkable.  Mucous membranes are moist.  Nasal O2 in

place.

NECK: Supple.  Full range of motion.  No adenopathy or thyromegaly.  Neck veins are

flat.

Cardiovascular examination reveals regular rhythm and rate.  Heart sounds are distant.

S1, S2 normal.  No distinct murmur.

Lungs reveal some mild bibasilar crackles.  No wheezes or rhonchi.

Abdomen is soft.  Bowel sounds are heard.

Extremities are intact.  No cyanosis, clubbing, or edema.



The patient was intubated on the 24th and extubated on 24th.



Today is postop day #4.



Microbiologic studies are negative.



Chest x-ray from January 28 shows some mild fluid overload with small bilateral pleural

effusions.



LAB DATA:

Lab data is reviewed.  White count 9.4, hemoglobin 7.2, hematocrit 23.6, platelet count

281,000.  Sodium 133, potassium 5, chloride 104, CO2 of 22, anion gap is 7.  BUN and

creatinine were 53 and 2.0.



Labs and medications are reviewed.



Vital signs are reviewed.



ASSESSMENT:

1. Postoperative day #4, status post 3-vessel bypass grafting.

2. History of coronary artery disease.

3. History of ischemic cardiomyopathy and left ventricular dysfunction.

4. History of chronic systolic heart failure.

5. Chronic obstructive pulmonary disease from previous tobacco use with an FEV1 that

    is 56% of predicted.

6. Postoperative atelectasis and small pleural effusions.

7. Postoperative routine ventilator management.

8. History of diabetes mellitus.

9. History of hyperlipidemia.

10.Iron deficiency anemia.

11.Peripheral vascular occlusive disease.

12.History of myocardial infarction.

13.Forty years of previous tobacco use.



PLAN:

The patient will continue with deep breathing, coughing and clearing of secretions.  We

will continue with updrafts.  The patient will continue using the incentive spirometer.

Additional recommendations and suggestions are forthcoming.  I have made the

recommendation to Cardiothoracic to consider discontinuing the dopamine.  Additional

recommendations and suggestions are forthcoming.





IVETTE / ROX: [id number] / Job#: [id number]

## 2019-01-28 NOTE — XR
EXAMINATION TYPE: XR chest 1V portable

 

DATE OF EXAM: 1/28/2019

 

COMPARISON: 1/27/2019

 

HISTORY: Chest tube

 

TECHNIQUE: Single frontal view of the chest is obtained.

 

FINDINGS:  Postsurgical change and cardiomegaly stable. Chest tube is been removed with no sizable pn
eumothorax. Bilateral consolidation and pleural effusion are stable and there is a diffuse interstiti
al pattern. Arthropathy of the shoulders.

 

IMPRESSION:  

1. Bilateral infiltrate and pleural effusion correlate for CHF versus pneumonia.

2. No sizable pneumothorax.

## 2019-01-29 LAB
ANION GAP SERPL CALC-SCNC: 7 MMOL/L
BUN SERPL-SCNC: 55 MG/DL (ref 7–17)
CALCIUM SPEC-MCNC: 8.1 MG/DL (ref 8.4–10.2)
CHLORIDE SERPL-SCNC: 103 MMOL/L (ref 98–107)
CO2 SERPL-SCNC: 22 MMOL/L (ref 22–30)
ERYTHROCYTE [DISTWIDTH] IN BLOOD BY AUTOMATED COUNT: 2.46 M/UL (ref 3.8–5.4)
ERYTHROCYTE [DISTWIDTH] IN BLOOD: 15.3 % (ref 11.5–15.5)
GLUCOSE BLD-MCNC: 138 MG/DL (ref 75–99)
GLUCOSE BLD-MCNC: 151 MG/DL (ref 75–99)
GLUCOSE BLD-MCNC: 183 MG/DL (ref 75–99)
GLUCOSE BLD-MCNC: 199 MG/DL (ref 75–99)
GLUCOSE SERPL-MCNC: 154 MG/DL (ref 74–99)
HCT VFR BLD AUTO: 21.9 % (ref 34–46)
HGB BLD-MCNC: 7.1 GM/DL (ref 11.4–16)
MAGNESIUM SPEC-SCNC: 3.6 MG/DL (ref 1.6–2.3)
MCH RBC QN AUTO: 29.1 PG (ref 25–35)
MCHC RBC AUTO-ENTMCNC: 32.7 G/DL (ref 31–37)
MCV RBC AUTO: 89.1 FL (ref 80–100)
PLATELET # BLD AUTO: 290 K/UL (ref 150–450)
POTASSIUM SERPL-SCNC: 4.9 MMOL/L (ref 3.5–5.1)
SODIUM SERPL-SCNC: 132 MMOL/L (ref 137–145)
WBC # BLD AUTO: 7.8 K/UL (ref 3.8–10.6)

## 2019-01-29 RX ADMIN — HYDROCODONE BITARTRATE AND ACETAMINOPHEN PRN EACH: 5; 325 TABLET ORAL at 03:59

## 2019-01-29 RX ADMIN — DOCUSATE SODIUM AND SENNOSIDES SCH EACH: 50; 8.6 TABLET ORAL at 21:15

## 2019-01-29 RX ADMIN — METOPROLOL TARTRATE SCH MG: 25 TABLET, FILM COATED ORAL at 08:05

## 2019-01-29 RX ADMIN — DILTIAZEM HYDROCHLORIDE SCH MG: 30 TABLET, FILM COATED ORAL at 08:05

## 2019-01-29 RX ADMIN — ATORVASTATIN CALCIUM SCH MG: 40 TABLET, FILM COATED ORAL at 08:05

## 2019-01-29 RX ADMIN — DILTIAZEM HYDROCHLORIDE SCH MG: 30 TABLET, FILM COATED ORAL at 23:57

## 2019-01-29 RX ADMIN — HEPARIN SODIUM SCH UNIT: 5000 INJECTION, SOLUTION INTRAVENOUS; SUBCUTANEOUS at 08:05

## 2019-01-29 RX ADMIN — ASPIRIN 325 MG ORAL TABLET SCH MG: 325 PILL ORAL at 08:05

## 2019-01-29 RX ADMIN — Medication SCH MG: at 16:05

## 2019-01-29 RX ADMIN — DILTIAZEM HYDROCHLORIDE SCH MG: 30 TABLET, FILM COATED ORAL at 16:05

## 2019-01-29 RX ADMIN — HEPARIN SODIUM SCH UNIT: 5000 INJECTION, SOLUTION INTRAVENOUS; SUBCUTANEOUS at 16:05

## 2019-01-29 RX ADMIN — IPRATROPIUM BROMIDE AND ALBUTEROL SULFATE SCH ML: .5; 3 SOLUTION RESPIRATORY (INHALATION) at 14:54

## 2019-01-29 RX ADMIN — OXYCODONE HYDROCHLORIDE AND ACETAMINOPHEN SCH MG: 500 TABLET ORAL at 16:05

## 2019-01-29 RX ADMIN — INSULIN ASPART SCH UNIT: 100 INJECTION, SOLUTION INTRAVENOUS; SUBCUTANEOUS at 21:15

## 2019-01-29 RX ADMIN — IPRATROPIUM BROMIDE AND ALBUTEROL SULFATE SCH ML: .5; 3 SOLUTION RESPIRATORY (INHALATION) at 07:41

## 2019-01-29 RX ADMIN — CLOPIDOGREL BISULFATE SCH MG: 75 TABLET ORAL at 08:05

## 2019-01-29 RX ADMIN — SODIUM CHLORIDE, PRESERVATIVE FREE SCH ML: 5 INJECTION INTRAVENOUS at 21:16

## 2019-01-29 RX ADMIN — INSULIN ASPART SCH UNIT: 100 INJECTION, SOLUTION INTRAVENOUS; SUBCUTANEOUS at 11:44

## 2019-01-29 RX ADMIN — DILTIAZEM HYDROCHLORIDE SCH MG: 30 TABLET, FILM COATED ORAL at 00:55

## 2019-01-29 RX ADMIN — ONDANSETRON PRN MG: 2 INJECTION INTRAMUSCULAR; INTRAVENOUS at 16:09

## 2019-01-29 RX ADMIN — MAGNESIUM HYDROXIDE PRN MG: 2400 SUSPENSION ORAL at 08:12

## 2019-01-29 RX ADMIN — HEPARIN SODIUM SCH UNIT: 5000 INJECTION, SOLUTION INTRAVENOUS; SUBCUTANEOUS at 23:57

## 2019-01-29 RX ADMIN — HEPARIN SODIUM SCH UNIT: 5000 INJECTION, SOLUTION INTRAVENOUS; SUBCUTANEOUS at 00:55

## 2019-01-29 RX ADMIN — SODIUM CHLORIDE, PRESERVATIVE FREE SCH ML: 5 INJECTION INTRAVENOUS at 08:06

## 2019-01-29 RX ADMIN — OXYCODONE HYDROCHLORIDE AND ACETAMINOPHEN SCH MG: 500 TABLET ORAL at 07:20

## 2019-01-29 RX ADMIN — IPRATROPIUM BROMIDE AND ALBUTEROL SULFATE SCH ML: .5; 3 SOLUTION RESPIRATORY (INHALATION) at 19:10

## 2019-01-29 RX ADMIN — INSULIN ASPART SCH UNIT: 100 INJECTION, SOLUTION INTRAVENOUS; SUBCUTANEOUS at 17:43

## 2019-01-29 RX ADMIN — INSULIN ASPART SCH UNIT: 100 INJECTION, SOLUTION INTRAVENOUS; SUBCUTANEOUS at 07:20

## 2019-01-29 RX ADMIN — IPRATROPIUM BROMIDE AND ALBUTEROL SULFATE SCH ML: .5; 3 SOLUTION RESPIRATORY (INHALATION) at 10:45

## 2019-01-29 RX ADMIN — PANTOPRAZOLE SODIUM SCH MG: 40 TABLET, DELAYED RELEASE ORAL at 07:20

## 2019-01-29 RX ADMIN — Medication SCH MG: at 07:20

## 2019-01-29 RX ADMIN — INSULIN DETEMIR SCH UNIT: 100 INJECTION, SOLUTION SUBCUTANEOUS at 21:15

## 2019-01-29 RX ADMIN — ONDANSETRON PRN MG: 2 INJECTION INTRAMUSCULAR; INTRAVENOUS at 04:12

## 2019-01-29 NOTE — PN
PROGRESS NOTE



DATE OF SERVICE:

01/29/2019



This is a 68-year-old female who was admitted back on January 24.  She is status post 3-

vessel bypass grafting, postop day #5.  The patient is doing reasonably well.  She

remains on O2 at 2 L.  She is getting dopamine drip at 2 mcg/kg per minute.  She is

getting a saline IV at 10 mL an hour.  Today's hemoglobin is 7.1.  Her BUN and

creatinine were 55 and 1.57.  The patient suffers from diabetes mellitus,

hyperlipidemia, iron deficiency anemia, peripheral vascular occlusive disease, previous

myocardial infarction and a previous history of tobacco dependence.  She did smoke 40

years at 1 pack a day as mentioned yesterday.  Again, she is doing better.  Clinically,

she is doing well.  I think she can move out to the floor.



Her current vital signs are reviewed, temperature is 97.8, heart rate 84, respiratory

rate 13, blood pressure 117/61 with a mean 79 and 2 L saturation at 95%.  She appears

in no acute distress.



PHYSICAL EXAMINATION:

HEENT: Grossly unremarkable.  Mucous membranes are moist.  No oral lesions.  Neck is

supple.  Full range of motion.  No adenopathy, thyromegaly or neck vein distention.

Cardiovascular examination reveals regular rhythm and rate.  Heart rate about 80.  S1,

S2 normal.  No distinct murmur noted.  Lungs are relatively clear.  A few scattered

mild rhonchi noted.  No wheezes or crackles.  Abdomen is soft.  Bowel sounds are heard.

There is no masses or tenderness.  Extremities are intact.  No cyanosis, clubbing, or

edema.  Skin without rash.  Neurologic examination is brief but nonfocal.



CHEST X-RAY:

From this morning reveals some postoperative changes.  There is some basilar

atelectasis and small bilateral pleural effusions.  The effusion on the left may be

bigger than the 1 on the right.



LABORATORY DATA:

Reviewed.  White count 7.8, hemoglobin 7.1, hematocrit 21.9, and platelet count

390,000.  Sodium 132, potassium 4.9, chloride is 103, CO2 is 22, BUN and creatinine

were 55, 1.57.



Microbiologic studies are negative.



Medications are reviewed.



ASSESSMENT:

1. Postoperative day #5, status post 3-vessel bypass grafting.

2. Coronary artery disease.

3. Ischemic cardiomyopathy and left ventricular dysfunction.

4. Chronic systolic heart failure.

5. Chronic obstructive pulmonary disease with an FEV1 that is 56% of predicted, stage

    II disease.

6. Postoperative atelectasis and small pleural effusions.

7. Postoperative routine ventilator management, resolved.

8. History of diabetes mellitus.

9. History of hyperlipidemia.

10.Iron deficiency anemia.

11.Peripheral vascular occlusive disease.

12.History of myocardial infarction.

13.Previous history of 40 years of tobacco use.



PLAN:

The patient will continue doing her incentive spirometry q.1 hour while awake.  We

encourage her to do deep breathing coughing and clearing of secretions.  The patient

will continue on updrafts q.i.d. and p.r.n.  The patient's dopamine will eventually be

weaned off.  She was started by Cardiothoracic Surgery.  They are waiting for

Nephrology input.  Her urine output seems reasonable.  Hemoglobin is above 7.  BUN and

creatinine are improved.  Will follow as needed.  The patient will follow up with us in

the office for routine followup chest x-ray.  Eventually, she will need additional

pulmonary function testing.





MMODL / IJN: [id number] / Job#: [id number]

## 2019-01-29 NOTE — P.PN
Subjective


Progress Note Date: 01/29/19


Principal diagnosis: 





Coronary artery disease, chronic systolic and diastolic heart failure with 

preoperative ejection fraction 20-25%, ischemic cardiomyopathy and LV 

dysfunction.  Previous medical history of non-STEMI earlier this month as well 

as in 2006 with stent placement at that time, hyperlipidemia, peripheral artery 

disease with stent placement to the left lower extremity in 2016, chronic 

kidney disease with baseline creatinine 1.3-1.5, recent vein surgery to 

bilateral lower extremity is, non-insulin-dependent diabetes mellitus with 

preoperative hemoglobin A1c 7.1%, shingles, obesity, moderate COPD with 

preoperative FEV1 51% of predicted, previous tobacco dependence, and family 

history of early coronary artery disease with mother dying before the age of 60 

years old during coronary artery bypass graft surgery.  Preoperative nasal swab 

positive for MSSA.  Preoperative normocytic, normochromic anemia.





POD #5 off-pump coronary artery bypass graft surgery 3 with the left internal 

mammary artery artery graft to the left anterior descending artery, right 

radial artery graft to the obtuse marginal and the left radial arterial graft 

to the posterior descending artery, bilateral endovascular radial artery 

harvest.  Intraoperative transesophageal echocardiogram by anesthesia.





Postoperative acute blood loss anemia, expected outcome.





Acute kidney injury, nonoliguric, potential outcome given patient's baseline 

chronic kidney disease.





She is currently sitting up in the recliner in no acute distress.  Denies any 

pain at this time, states pain is well controlled on current medication 

regimen.  Denies shortness of breath.  Has ambulated in her room.  Dopamine 

decreased yesterday, remains hemodynamically stable.  Clinically she looks very 

good in states she is feeling better every day, no new complaints.





Objective





- Vital Signs


Vital signs: 


 Vital Signs











Temp  97.8 F   01/29/19 04:00


 


Pulse  88   01/29/19 07:54


 


Resp  13   01/29/19 07:00


 


BP  117/63   01/29/19 07:00


 


Pulse Ox  96   01/29/19 07:00








 Intake & Output











 01/28/19 01/29/19 01/29/19





 18:59 06:59 18:59


 


Intake Total 296.87 219.1 


 


Output Total 650 750 


 


Balance -353.13 -530.9 


 


Weight   97.1 kg


 


Intake:   


 


  .87 219.1 


 


    0.9 NS  30 


 


    DOPamine DRIP 800 mg In 10  





    Dextrose/Water 1 500ml.   





    bag @ 3 MCG/KG/MIN 10.22   





    mls/hr IV .Q24H ONESIMO Rx#:   





    [id number]   


 


    Dextrose/Water 1 500ml. 76.87 89.1 





    bag @ 2 MCG/KG/MIN 7.27   





    mls/hr IV .Q24H ONESIMO with   





    DOPamine DRIP 800 mg Rx#:   





    [id number]   


 


    Lactated Ringers 1,000 ml 90 100 





    @ 20 mls/hr IV .Q24H ONESIMO   





    Rx#:[id number]   


 


  Oral 120  


 


Output:   


 


  Urine 650 750 


 


Other:   


 


  Voiding Method Bedside Commode Bedside Commode 


 


  # Voids 1  








 ABP, PAP, CO, CI - Last Documented











Arterial Blood Pressure        102/52


 


Pulmonary Artery Pressure      39/22


 


Cardiac Output                 5.9


 


Cardiac Index                  2.9

















- Constitutional


General appearance: Present: cooperative, no acute distress, obese





- Respiratory


Details: 





Coarse breath sounds in the bases.  Respirations even, nonlabored.  Currently 

on 2 L nasal cannula with oxygen saturation 95%.  Able to achieve 750 mL on her 

incentive spirometry.  Strong productive cough.








- Cardiovascular


Details: 





S1, S2 present.  Regular rate and rhythm, sinus rhythm on telemetry.   Sternum 

stable.  Palpable peripheral pulses bilaterally.  Trace generalized edema 

present.  No calf pain or tenderness noted.  Heart hugger in place with patient 

demonstrating appropriate use.  Antiembolism stockings, SCDs present.  





- Gastrointestinal


Gastrointestinal Comment(s): 





Abdomen soft, nontender, nondistended.  Active bowel sounds present 4 

quadrants.  Tolerating diet.  Positive flatus, negative bowel movement.





- Genitourinary


Genitourinary Comment(s): 





Continues to void clear, yellow urine.  Output overnight was 550 mL.








- Integumentary


Integumentary Comment(s): 





Skin is warm and dry with evidence of good perfusion.  Anterior chest incision 

well approximated and covered with dry intact dressing.  Bilateral radial 

artery harvest sites well approximated, positive feeling without numbness or 

tingling in all fingers, able to move both hands without difficulty.





- Neurologic


Neurologic: Present: CNII-XII intact





- Musculoskeletal


Musculoskeletal: Present: gait normal, strength equal bilaterally





- Psychiatric


Psychiatric: Present: A&O x's 3, appropriate affect, intact judgment & insight





- Allied health notes


Allied health notes reviewed: nursing





- Labs


CBC & Chem 7: 


 01/29/19 04:49





 01/29/19 04:49


Labs: 


 Abnormal Lab Results - Last 24 Hours (Table)











  01/28/19 01/28/19 01/28/19 Range/Units





  11:48 16:56 20:37 


 


RBC     (3.80-5.40)  m/uL


 


Hgb     (11.4-16.0)  gm/dL


 


Hct     (34.0-46.0)  %


 


Sodium     (137-145)  mmol/L


 


BUN     (7-17)  mg/dL


 


Creatinine     (0.52-1.04)  mg/dL


 


Glucose     (74-99)  mg/dL


 


POC Glucose (mg/dL)  165 H  173 H  211 H  (75-99)  mg/dL


 


Calcium     (8.4-10.2)  mg/dL


 


Magnesium     (1.6-2.3)  mg/dL














  01/29/19 01/29/19 01/29/19 Range/Units





  04:49 04:49 06:57 


 


RBC  2.46 L    (3.80-5.40)  m/uL


 


Hgb  7.1 L    (11.4-16.0)  gm/dL


 


Hct  21.9 L    (34.0-46.0)  %


 


Sodium   132 L   (137-145)  mmol/L


 


BUN   55 H   (7-17)  mg/dL


 


Creatinine   1.57 H   (0.52-1.04)  mg/dL


 


Glucose   154 H   (74-99)  mg/dL


 


POC Glucose (mg/dL)    151 H  (75-99)  mg/dL


 


Calcium   8.1 L   (8.4-10.2)  mg/dL


 


Magnesium   3.6 H   (1.6-2.3)  mg/dL














- Imaging and Cardiology


Chest x-ray: image reviewed





Assessment and Plan


(1) Coronary artery disease


Current Visit: Yes   Status: Chronic   Code(s): I25.10 - ATHSCL HEART DISEASE 

OF NATIVE CORONARY ARTERY W/O ANG PCTRS   SNOMED Code(s): 78572710


   





(2) Ischemic cardiomyopathy


Current Visit: Yes   Status: Chronic   Code(s): I25.5 - ISCHEMIC CARDIOMYOPATHY

   SNOMED Code(s): 434242700


   





(3) Status post three vessel coronary artery bypass


Current Visit: Yes   Status: Acute   Code(s): Z95.1 - PRESENCE OF AORTOCORONARY 

BYPASS GRAFT   SNOMED Code(s): 777943502


   





(4) Systolic heart failure


Current Visit: Yes   Status: Chronic   Code(s): I50.20 - UNSPECIFIED SYSTOLIC (

CONGESTIVE) HEART FAILURE   SNOMED Code(s): 163477434


   





(5) COPD (chronic obstructive pulmonary disease)


Current Visit: Yes   Status: Chronic   Code(s): J44.9 - CHRONIC OBSTRUCTIVE 

PULMONARY DISEASE, UNSPECIFIED   SNOMED Code(s): 97002702


   





(6) Diabetes mellitus


Current Visit: Yes   Status: Chronic   Code(s): E11.9 - TYPE 2 DIABETES 

MELLITUS WITHOUT COMPLICATIONS   SNOMED Code(s): 23253779


   





(7) History of heart artery stent


Current Visit: Yes   Status: Chronic   Code(s): Z95.5 - PRESENCE OF CORONARY 

ANGIOPLASTY IMPLANT AND GRAFT   SNOMED Code(s): 587914517


   





(8) Hyperlipidemia


Current Visit: Yes   Status: Chronic   Code(s): E78.5 - HYPERLIPIDEMIA, 

UNSPECIFIED   SNOMED Code(s): 24026210


   





(9) Iron deficiency anemia


Current Visit: Yes   Status: Chronic   Code(s): D50.9 - IRON DEFICIENCY ANEMIA, 

UNSPECIFIED   SNOMED Code(s): 39192864


   





(10) Obesity (BMI 30.0-34.9)


Current Visit: Yes   Status: Chronic   Code(s): E66.9 - OBESITY, UNSPECIFIED   

SNOMED Code(s): 627876576351564


   





(11) Peripheral artery disease


Current Visit: Yes   Status: Chronic   Code(s): I73.9 - PERIPHERAL VASCULAR 

DISEASE, UNSPECIFIED   SNOMED Code(s): 209584328


   





(12) History of myocardial infarction, greater than 8 weeks ago


Current Visit: No   Status: Resolved   Code(s): I25.2 - OLD MYOCARDIAL 

INFARCTION   SNOMED Code(s): 7988515


   





(13) Tobacco dependence in remission


Current Visit: No   Status: Resolved   Code(s): F17.201 - NICOTINE DEPENDENCE, 

UNSPECIFIED, IN REMISSION   SNOMED Code(s): 589278211


   


Plan: 





1.  Continue aspirin, statin, Plavix, beta blocker therapy.  Will increase beta 

blocker therapy as tolerated, will increase to 25 mg twice daily today.


2.  Will decrease dopamine to 1 mics.  Nephrology consulted, appreciate 

recommendations.  No nephrotoxic agents.


3.  Continue oral Cardizem for radial artery spasm.  


4.  Wean O2 as tolerated.  Encourage incentive spirometry is 10 times every 

hour while awake.


5.  Increase activity, ambulate as tolerated.  PT/OT/cardiac rehab following.


6.  Pain control with current medication regimen.  No Toradol secondary to 

chronic renal insufficiency.


7.  Bronchodilators per pulmonology.


8.  Insulin management per primary care service.


9.  GI prophylaxis with Protonix, DVT prophylaxis with subcu heparin, SCDs.


10.  Will monitor daily labs and x-rays.  Electrolyte replacement per protocol.


11.  Dietitian to see patient for education regarding weight loss, reduced fat, 

reduced sugar diet.


12.  More recommendations to follow.


Time with Patient: Greater than 30

## 2019-01-29 NOTE — P.PN
Subjective


Progress Note Date: 01/29/19


Principal diagnosis: 





Status post open heart with CABG





This is a pleasant 68-year-old  female patient with past medical 

history the patient does have coronary artery disease with prior coronary 

artery stenting, diabetes type 2, hypertension, and dyslipidemia.who we 

consulted to see for cardiac follow-up after coronary artery bypass grafting.





Earlier this month, January 2019, the patient presented to the hospital with 

shortness of breath.  She was ruled in for acute non-ST deviation myocardial 

infarction with abnormal EKG as well as abnormal cardiac enzymes.  At that 

point she underwent heart catheterization by Dr. Thayer and that revealed severe 

triple-vessel coronary artery disease.  Because of that the patient was 

referred to undergo coronary artery bypass grafting.





The patient underwent yesterday coronary artery bypass grafting 3 where she 

received LIMA to LAD, HUDSON to OM, and radial artery to right coronary artery.





The last echocardiogram from January 2019 revealed impaired LV function with EF 

between 20-25% with basal inferior hypokinesia and inferoseptal hypokinesia.





On follow-up with the patient today, January 29 of 2019, the patient continues 

to be doing well clinically.  Her creatinine has improved.  Nephrology consult 

was placed.  The blood pressure is better as well after decreasing the dose of 

Cardizem.  The patient continues to be on renal dose dopamine.  Continue dual 

antiplatelet therapy along with a statin.  The hemoglobin this morning 7.1.

















Objective





- Vital Signs


Vital signs: 


 Vital Signs











Temp  97.8 F   01/29/19 04:00


 


Pulse  86   01/29/19 07:00


 


Resp  13   01/29/19 07:00


 


BP  117/63   01/29/19 07:00


 


Pulse Ox  96   01/29/19 07:00








 Intake & Output











 01/28/19 01/29/19 01/29/19





 18:59 06:59 18:59


 


Intake Total 296.87 219.1 


 


Output Total 650 750 


 


Balance -353.13 -530.9 


 


Weight   97.1 kg


 


Intake:   


 


  .87 219.1 


 


    0.9 NS  30 


 


    DOPamine DRIP 800 mg In 10  





    Dextrose/Water 1 500ml.   





    bag @ 3 MCG/KG/MIN 10.22   





    mls/hr IV .Q24H ONESIMO Rx#:   





    [id number]   


 


    Dextrose/Water 1 500ml. 76.87 89.1 





    bag @ 2 MCG/KG/MIN 7.27   





    mls/hr IV .Q24H ONESIMO with   





    DOPamine DRIP 800 mg Rx#:   





    [id number]   


 


    Lactated Ringers 1,000 ml 90 100 





    @ 20 mls/hr IV .Q24H ONESIMO   





    Rx#:[id number]   


 


  Oral 120  


 


Output:   


 


  Urine 650 750 


 


Other:   


 


  Voiding Method Bedside Commode Bedside Commode 


 


  # Voids 1  








 ABP, PAP, CO, CI - Last Documented











Arterial Blood Pressure        102/52


 


Pulmonary Artery Pressure      39/22


 


Cardiac Output                 5.9


 


Cardiac Index                  2.9

















- Constitutional


General appearance: Present: no acute distress





- Respiratory


Respiratory: bilateral: CTA





- Cardiovascular


Rhythm: regular


Heart sounds: normal: S1, S2





- Labs


CBC & Chem 7: 


 01/29/19 04:49





 01/29/19 04:49


Labs: 


 Abnormal Lab Results - Last 24 Hours (Table)











  01/28/19 01/28/19 01/28/19 Range/Units





  11:48 16:56 20:37 


 


RBC     (3.80-5.40)  m/uL


 


Hgb     (11.4-16.0)  gm/dL


 


Hct     (34.0-46.0)  %


 


Sodium     (137-145)  mmol/L


 


BUN     (7-17)  mg/dL


 


Creatinine     (0.52-1.04)  mg/dL


 


Glucose     (74-99)  mg/dL


 


POC Glucose (mg/dL)  165 H  173 H  211 H  (75-99)  mg/dL


 


Calcium     (8.4-10.2)  mg/dL


 


Magnesium     (1.6-2.3)  mg/dL














  01/29/19 01/29/19 01/29/19 Range/Units





  04:49 04:49 06:57 


 


RBC  2.46 L    (3.80-5.40)  m/uL


 


Hgb  7.1 L    (11.4-16.0)  gm/dL


 


Hct  21.9 L    (34.0-46.0)  %


 


Sodium   132 L   (137-145)  mmol/L


 


BUN   55 H   (7-17)  mg/dL


 


Creatinine   1.57 H   (0.52-1.04)  mg/dL


 


Glucose   154 H   (74-99)  mg/dL


 


POC Glucose (mg/dL)    151 H  (75-99)  mg/dL


 


Calcium   8.1 L   (8.4-10.2)  mg/dL


 


Magnesium   3.6 H   (1.6-2.3)  mg/dL














Assessment and Plan


Assessment: 





Assessment


#1 severe underlying coronary artery disease as described above


#2 status post coronary artery bypass grafting 3 as described above


#3 diabetes type 2


#4 hypertension


#5 dyslipidemia





Plan


#1 continue the current medical regimen including dual antiplatelet therapy 

along with statin and metoprolol.  


#2 continue monitor the hemoglobin as well as BUN and electrolytes


#3 continue daily chest x-ray


#4 follow-up with the patient.








Thank you for allowing us participate in her care and we will continue 

following up with the patient

## 2019-01-29 NOTE — P.PN
Subjective





this is a pleasant 67 yo F [facility] pmh of CHF and low EF: 20-25% and chronic 

diabetic kidney disease, who is status post CABG on 1/24/19, she developed 

acute kidney injury. she is lying in chair with no chest pain , no dyspnea, no 

abdominal pain , no nausea or vomiting . she is hemodynamically stable with no 

fever. her hemoglobin is 7.1 which is expected after surgery and is put on iron 

pill.her creatinine is 1.5 , baseline around 1.2-1.3, it went up to 2.0 and now 

is improving , pt is been followed by nephrology team , also has mild 

hyponatremia. sugar is controlled. 


pt is planed to go to [facility] upon discharge.





Objective





- Vital Signs


Vital signs: 


 Vital Signs











Temp  97.7 F   01/29/19 20:00


 


Pulse  98   01/29/19 21:00


 


Resp  12   01/29/19 21:00


 


BP  108/61   01/29/19 21:00


 


Pulse Ox  94 L  01/29/19 21:00








 Intake & Output











 01/29/19 01/29/19 01/30/19





 06:59 18:59 06:59


 


Intake Total 219.1 760 


 


Output Total 750 800 200


 


Balance -530.9 -40 -200


 


Weight  97.1 kg 


 


Intake:   


 


  .1 310 


 


    0.9 NS 30 310 


 


    Dextrose/Water 1 500ml. 89.1  





    bag @ 1 MCG/KG/MIN 3.63   





    mls/hr IV .Q24H ONESIMO with   





    DOPamine DRIP 800 mg Rx#:   





    [id number]   


 


    Lactated Ringers 1,000 ml 100  





    @ 20 mls/hr IV .Q24H ONESIMO   





    Rx#:[id number]   


 


  Oral  450 


 


Output:   


 


  Urine 750 800 200


 


Other:   


 


  Voiding Method Bedside Commode Bedside Commode Bedside Commode


 


  # Bowel Movements   1








 ABP, PAP, CO, CI - Last Documented











Arterial Blood Pressure        102/52


 


Pulmonary Artery Pressure      39/22


 


Cardiac Output                 5.9


 


Cardiac Index                  2.9

















- Exam





GENERAL: The patient is alert and oriented x3, not in any acute distress. Well 

developed, well nourished. 


HEENT: Pupils are round and equally reacting to light. EOMI. No scleral 

icterus. No conjunctival pallor. Normocephalic, atraumatic. No pharyngeal 

erythema. No thyromegaly. 


CARDIOVASCULAR: S1 and S2 present. No murmurs, rubs, or gallops. 


PULMONARY: Chest is clear to auscultation, no wheezing or crackles. 


ABDOMEN: Soft, nontender, nondistended, normoactive bowel sounds. No palpable 

organomegaly.  MUSCULOSKELETAL: No joint swelling or deformity.


EXTREMITIES: No cyanosis, clubbing, or pedal edema. 


NEUROLOGICAL: Gross neurological examination did not reveal any focal deficits. 


SKIN: No rashes.





- Labs


CBC & Chem 7: 


 01/29/19 04:49





 01/29/19 04:49


Labs: 


 Abnormal Lab Results - Last 24 Hours (Table)











  01/29/19 01/29/19 01/29/19 Range/Units





  04:49 04:49 06:57 


 


RBC  2.46 L    (3.80-5.40)  m/uL


 


Hgb  7.1 L    (11.4-16.0)  gm/dL


 


Hct  21.9 L    (34.0-46.0)  %


 


Sodium   132 L   (137-145)  mmol/L


 


BUN   55 H   (7-17)  mg/dL


 


Creatinine   1.57 H   (0.52-1.04)  mg/dL


 


Glucose   154 H   (74-99)  mg/dL


 


POC Glucose (mg/dL)    151 H  (75-99)  mg/dL


 


Calcium   8.1 L   (8.4-10.2)  mg/dL


 


Magnesium   3.6 H   (1.6-2.3)  mg/dL














  01/29/19 01/29/19 01/29/19 Range/Units





  11:41 17:22 20:49 


 


RBC     (3.80-5.40)  m/uL


 


Hgb     (11.4-16.0)  gm/dL


 


Hct     (34.0-46.0)  %


 


Sodium     (137-145)  mmol/L


 


BUN     (7-17)  mg/dL


 


Creatinine     (0.52-1.04)  mg/dL


 


Glucose     (74-99)  mg/dL


 


POC Glucose (mg/dL)  138 H  199 H  183 H  (75-99)  mg/dL


 


Calcium     (8.4-10.2)  mg/dL


 


Magnesium     (1.6-2.3)  mg/dL














Assessment and Plan


Assessment: 





coronary artery diseases status post CABG


acute kidney injury 


chronic kidney disease secondary to diabetic nephropathy


chronic congestive heart failure with EF 20-2%


Plan: 





this is a pleasatn 67 yo F , she is status post CABG and acute kidney injury, 

pt is improving . continue with statin , plavix , insulin


continue with the same treatment , continue with symptomatic treatment , resume 

home medication , monitor lytes and vitals including glucose , c/w iv fluids, 

cardiology consult is appreciated. infectious disease consult is appreciated . c

/w same antibioitc . GI and DVT prophylaxis , further recommendation based upon 

pt clinical course and progress


DVT prophylaxis subcutaneous heparin


GI prophylaxis Protonix


PT/OT: pending 


Prognosis is guarded

## 2019-01-29 NOTE — P.CONS
History of Present Illness





- Chief Complaint


Cardiac debility





- History of Present Illness





I had the opportunity to see patient for inpatient rehab consultation with 

regard to cardiac debility.  She was admitted for elective CABG 3 vessel which 

was performed on date of admission, gender 24, Dr. Hoskins.  Seen in 

consultation by Dr. Melany Salazar and Laura.  Chest x-rays followed for bilateral 

consolidation and effusions.  OT reports minimal assistance for upper dressing 

and maximal assistance for lower dressing.  Moderate to maximal assistance for 

bathing and maximal assistance toileting.  Minimal assistance for toilet 

transfer and minimum moderate assistance to person for bed transfer.  PT 

reports minimal assistance and 2 person for functional ability and gait 10 feet

, limited endurance.





Previous functional history as elicited patient currently by : 68-year-

old left-handed white female who is  lives in one floor home with 

.  Both retired.  They share the cooking and laundry.  Patient can with 

driving, standing shower and gait without device.  History smoking but doesn't 

smoke currently.  Rare drink.  Dr. Stephens is regular doctor.





Family history of cardiac disease and mother and multiple problems in father.





Review of Systems





Review of systems:


ENT: Denies sneezes or discharge.


Eyes: Denies discharge or photophobia.


Cardiac: Denies chest pain or palpitation.  Perhaps midline sternotomy 

discomfort.


Pulmonary: At least mild exertional shortness of breath.


Breast: Denies discharge or lumps.


Gastrointestinal: Denies nausea, emesis, constipation, diarrhea.


Genitourinary: Denies discharge or frequency.


Musculoskeletal: Denies muscle or bone aches.


Neurologic: Gen. weakness.


Endocrine: Denies shakes or sweats.


Oncology: Denies cancers.


Dermatologic: Denies rash, itching, pruritus.


ALLERGY/immunology: Denies sneezes, rashes.








Past Medical History


Past Medical History: Coronary Artery Disease (CAD), Chest Pain / Angina, 

Diabetes Mellitus, Hyperlipidemia, Myocardial Infarction (MI)


Additional Past Medical History / Comment(s): varicose veins,MI x2


Last Myocardial Infarction Date:: 2006,2019


History of Any Multi-Drug Resistant Organisms: None Reported


Past Surgical History:  Section, Heart Catheterization, Heart 

Catheterization With Stent


Additional Past Surgical History / Comment(s): vein work-last procedure Dec 2018

, stent above knee left leg,heart stents x3


Past Anesthesia/Blood Transfusion Reactions: No Reported Reaction


Additional Past Anesthesia/Blood Transfusion Reaction / Comm: no hx blood 

transfusion


Date of Last Stent Placement:: 2006


Past Psychological History: No Psychological Hx Reported


Smoking Status: Former smoker


Past Alcohol Use History: None Reported


Additional Past Alcohol Use History / Comment(s): quit smoking ,smoked 

approx 40 yrs 1ppd


Past Drug Use History: None Reported





- Past Family History


  ** Mother


Family Medical History: Coronary Artery Disease (CAD)


Additional Family Medical History / Comment(s): mother passed away during CABG





  ** Father


Family Medical History: Cancer


Additional Family Medical History / Comment(s): colon CA





  ** Brother(s)


Family Medical History: Cancer


Additional Family Medical History / Comment(s): colon CA





  ** Sister(s)


Family Medical History: Cancer


Additional Family Medical History / Comment(s): breast CA





Medications and Allergies


 Home Medications











 Medication  Instructions  Recorded  Confirmed  Type


 


Aspirin EC [Ecotrin] 325 mg PO DAILY 18 History


 


Lisinopril [Zestril] 5 mg PO DAILY 18 History


 


metFORMIN HCL 1,000 mg PO BID 18 History


 


Glimepiride [Amaryl] 2 mg PO DAILY 19 History


 


Oxybutynin Chloride [Oxybutynin 10 mg PO DAILY 19 History





Chloride ER]    


 


Atorvastatin [Lipitor] 40 mg PO DAILY #30 tab 19 Rx


 


Metoprolol Tartrate [Lopressor] 50 mg PO BID #60 tab 19 Rx


 


Nitroglycerin Sl Tabs [Nitrostat] 0.4 mg SUBLINGUAL Q5M PRN #25 tab 19 Rx


 


Spironolactone [Aldactone] 12.5 mg PO DAILY #30 tab 19 Rx











 Allergies











Allergy/AdvReac Type Severity Reaction Status Date / Time


 


tetracycline Allergy  Rash/Hives Verified 19 14:02














Physical Exam


Vitals: 


 Vital Signs











  Temp Pulse Pulse Pulse Resp BP Pulse Ox


 


 19 15:03   84     


 


 19 15:00   81    15  123/63  98


 


 19 14:55   82     


 


 19 14:13    82  94   


 


 19 14:00   84    61 H  111/64  92 L


 


 19 13:00   80    18  121/63  95


 


 19 12:00   79    19  102/59  96


 


 19 11:00   73    16  96/53  96


 


 19 10:55   77     


 


 19 10:46   73     


 


 19 10:00   80    23  116/63  96


 


 19 09:00   81    33 H  107/59  95


 


 19 08:00   84    24  117/61  95


 


 19 07:54   88     


 


 19 07:43   86     


 


 19 07:00   86    13  117/63  96


 


 19 06:00   90    15  109/61  93 L


 


 19 05:00   88    12  125/81  93 L


 


 19 04:00  97.8 F  89    12  102/63  94 L


 


 19 03:00   85    13  115/61  93 L


 


 19 02:00   88    14  116/61  94 L


 


 19 01:00   87    18  106/69  93 L


 


 19 00:00  98.0 F  88    15  116/62  93 L


 


 19 23:00   89    14  119/60  93 L


 


 19 22:00   90    19  114/54  93 L


 


 19 21:00   104 H    32 H  117/64  96


 


 19 20:10   80     


 


 19 20:00  97.9 F  92    17  113/60  95


 


 19 19:58   84     


 


 19 19:00   89    27 H  122/65  97


 


 19 18:00   94    19  115/58  95


 


 19 17:00   93    12  116/62  93 L














  Pulse Ox Pulse Ox


 


 19 15:03  


 


 19 15:00  


 


 19 14:55  


 


 19 14:13  93 L  84 L


 


 19 14:00  


 


 19 13:00  


 


 19 12:00  


 


 19 11:00  


 


 19 10:55  


 


 19 10:46  


 


 19 10:00  


 


 19 09:00  


 


 19 08:00  


 


 19 07:54  


 


 19 07:43  


 


 19 07:00  


 


 19 06:00  


 


 19 05:00  


 


 19 04:00  


 


 19 03:00  


 


 19 02:00  


 


 19 01:00  


 


 19 00:00  


 


 19 23:00  


 


 19 22:00  


 


 19 21:00  


 


 19 20:10  


 


 19 20:00  


 


 19 19:58  


 


 19 19:00  


 


 19 18:00  


 


 19 17:00  








 Intake and Output











 19





 06:59 14:59 22:59


 


Intake Total 154.8 460 


 


Output Total 550 550 


 


Balance -395.2 -90 


 


Intake:   


 


  .8 10 


 


    0.9 NS 30 10 


 


    Dextrose/Water 1 500ml. 64.8  





    bag @ 1 MCG/KG/MIN 3.63   





    mls/hr IV .Q24H ONESIMO with   





    DOPamine DRIP 800 mg Rx#:   





    [id number]   


 


    Lactated Ringers 1,000 ml 60  





    @ 20 mls/hr IV .Q24H ONESIMO   





    Rx#:[id number]   


 


  Oral  450 


 


Output:   


 


  Urine 550 550 


 


Other:   


 


  Voiding Method Bedside Commode Bedside Commode 


 


  Weight  97.1 kg 








 ABP, PAP, CO, CI - Last 8 Hours











Cardiac Output                 5.9


 


Cardiac Output                 5.9


 


Cardiac Output                 5.9


 


Cardiac Output                 5.9


 


Cardiac Output                 5.9


 


Cardiac Output                 5.9

















Skin: Good color, texture, turgor.


General: Overweight build and comfortable appearance.


Head: Normocephalic, atraumatic.


Eyes: Symmetric.  Pupils equal round.


Ears: Symmetric.  Hearing within normal limits.


Mouth: Clear.


Neck: Supple.  Carotid without bruit.


Cardiac: Regular rate and rhythm.  Sternum clean and dressed.  Wearing harness.


Lungs: Clear anteriorly and posteriorly.


Abdomen: Soft active nontender.  Overweight.


Extremities: Normal tone.


Neurological: Mental status: Alert, cooperative, pleasant.


Cranial nerves: Symmetric facial tone and trapezius.


Motor: Normal strength and isolation all 4 limbs, but legs are less than 

antigravity.


Sensation: Intact throughout.


DTRs: Symmetric and equal throughout.


Mobility: Nurse reports two-person assistance for transfer into [facility] chair.





Results


CBC & Chem 7: 


 19 04:49





 19 04:49


Labs: 


 Abnormal Lab Results - Last 24 Hours (Table)











  19 Range/Units





  16:56 20:37 04:49 


 


RBC    2.46 L  (3.80-5.40)  m/uL


 


Hgb    7.1 L  (11.4-16.0)  gm/dL


 


Hct    21.9 L  (34.0-46.0)  %


 


Sodium     (137-145)  mmol/L


 


BUN     (7-17)  mg/dL


 


Creatinine     (0.52-1.04)  mg/dL


 


Glucose     (74-99)  mg/dL


 


POC Glucose (mg/dL)  173 H  211 H   (75-99)  mg/dL


 


Calcium     (8.4-10.2)  mg/dL


 


Magnesium     (1.6-2.3)  mg/dL














  19 Range/Units





  04:49 06:57 11:41 


 


RBC     (3.80-5.40)  m/uL


 


Hgb     (11.4-16.0)  gm/dL


 


Hct     (34.0-46.0)  %


 


Sodium  132 L    (137-145)  mmol/L


 


BUN  55 H    (7-17)  mg/dL


 


Creatinine  1.57 H    (0.52-1.04)  mg/dL


 


Glucose  154 H    (74-99)  mg/dL


 


POC Glucose (mg/dL)   151 H  138 H  (75-99)  mg/dL


 


Calcium  8.1 L    (8.4-10.2)  mg/dL


 


Magnesium  3.6 H    (1.6-2.3)  mg/dL














Assessment and Plan


(1) Status post three vessel coronary artery bypass


Current Visit: Yes   Status: Acute   Code(s): Z95.1 - PRESENCE OF AORTOCORONARY 

BYPASS GRAFT   SNOMED Code(s): 644378106


   





(2) Ischemic cardiomyopathy


Current Visit: Yes   Status: Chronic   Code(s): I25.5 - ISCHEMIC CARDIOMYOPATHY

   SNOMED Code(s): 569586441


   


Plan: 





Impression:


1.  Cardiac debility.


2.  Ischemic cardiomyopathy with history of angina and MI.


3.  Status post three-vessel cord bypass.


4.  Diabetes.


5.  Dyslipidemia.





Constant plan: At this time PT and OT are ongoing.  Safety concerns noted.  

Would anticipate need and benefit inpatient rehab at this time.

## 2019-01-29 NOTE — XR
EXAMINATION TYPE: XR chest 1V portable

 

DATE OF EXAM: 1/29/2019

 

COMPARISON: 1/28/2019

 

HISTORY: Post CABG

 

TECHNIQUE: Single frontal view of the chest is obtained.

 

FINDINGS:  Postsurgical change and cardiomegaly stable. Chest tube is been removed with no sizable pn
eumothorax. Bilateral consolidation and pleural effusion are stable and there is a diffuse interstiti
al pattern. Arthropathy of the shoulders. Mediastinum is widened but stable correlate clinically. Hea
rt is enlarged.

 

 

IMPRESSION:  

1. Bilateral consolidation and pleural effusion with diffuse interstitial pattern. Findings most typi
dash

## 2019-01-29 NOTE — PN
PROGRESS NOTE



DATE OF SERVICE:

January 28, 2019.



PRESENTING COMPLAINT:

CABG.



INTERVAL HISTORY:

Patient is status post coronary artery bypass, up in a chair.  Urine output has been a

bit low.  Creatinine has crept up a bit.  Blood pressure is tending to run on the lower

side.  Did tolerate some diet.  Sitting up.



REVIEW OF SYSTEMS:

Done for constitutional, cardiovascular, GI, pulmonary; relevant findings as above.



MEDICATIONS:

Current medications reviewed.



PHYSICAL EXAMINATION:

VITAL SIGNS: On examination, temperature 97, pulse 87, respirations 14, blood pressure

102/58, pulse ox 99 percent on 2 L.

GENERAL APPEARANCE:  Sitting up, more awake today.

EYES:  Pupils equal.  Conjunctivae pale.

NECK:  JVD unable to assess. Mass not palpable.  RESPIRATORY:  Effort increased.

LUNGS: Decreased breath sounds.

CARDIOVASCULAR:  1st and 2nd sounds normal. Minimal edema.

ABDOMEN:  Soft, nontender.  Liver and spleen not palpable.

PSYCHIATRY:  Awake, answering questions.



INVESTIGATIONS:

White count 9.4, hemoglobin 7.2, potassium 5.0, BUN 43, creatinine 2.0.



ASSESSMENT:

1. Status post triple vessel coronary artery bypass.

2. Chronic congestive heart failure from systolic dysfunction, ejection fraction 20-30

    percent underlying coronary artery disease.

3. Acute non-Q-wave myocardial infarction last admission.

4. Peripheral artery disease.

5. Chronic urine incontinence.

6. Diabetes mellitus type 2 on oral hypoglycemic.

7. Chronic kidney stage 3 from diabetic nephropathy and nephrosclerosis.

8. Acute renal failure probably prerenal with slight some worsening.

9. Acute postoperative blood loss anemia expected from surgery.

10.Metabolic acidosis from renal failure.

11.Hypoalbuminemia as an acute phase reactant.



PLAN:

Care was discussed with the patient.  Nephrology consult is started.  Accu-Cheks are

closely followed.





MMODL / IJN: [id number] / Job#: [id number]

## 2019-01-29 NOTE — P.NPCON
History of Present Illness





- Reason for Consult


acute renal failure





- History of Present Illness





Reason for consultation: Acute kidney injury





History of present illness:


Patient is a 68-year-old female seen in renal consultation for acute kidney 

injury.  Unclear as to what her baseline renal function is.  Patient was 

admitted to the hospital earlier this month and her creatinine was stable in 

the range of 1.2-1.3.  She does have long-standing history of diabetes 

mellitus.  At that time the patient presented with non-ST elevated myocardial 

infarction and underwent a cardiac catheterization which revealed triple-vessel 

disease.  She underwent CABG on .  Currently in the ICU.  She is 

awake and alert.  Oral intake is fair.  No vomiting or diarrhea.  No active 

chest pain or shortness of breath.  She is maintained on low-dose dopamine.  

Creatinine peaked at 2 and is down to 1.57 today.  Lerma catheter has been 

discontinued.  She has been voiding.





Vital signs are stable.


General: The patient appeared well nourished and normally developed. 


HEENT: Head exam is unremarkable. Neck is without jugular venous distension.


LUNGS: Lungs are clear to auscultation and percussion. Breath sounds decreased.


HEART: Rate and Rhythm are regular. First and second heart sounds normal. No 

murmurs, rubs or gallops. 


ABDOMEN: Abdominal exam reveals normal bowel sounds. Non-tender and non-

distended. No evidence of peritonitis.


EXTREMITITES: No clubbing, cyanosis, or edema.





Past Medical History


Past Medical History: Coronary Artery Disease (CAD), Chest Pain / Angina, 

Diabetes Mellitus, Hyperlipidemia, Myocardial Infarction (MI)


Additional Past Medical History / Comment(s): varicose veins,MI x2


Last Myocardial Infarction Date:: 2006,


History of Any Multi-Drug Resistant Organisms: None Reported


Past Surgical History:  Section, Heart Catheterization, Heart 

Catheterization With Stent


Additional Past Surgical History / Comment(s): vein work-last procedure Dec 2018

, stent above knee left leg,heart stents x3


Past Anesthesia/Blood Transfusion Reactions: No Reported Reaction


Additional Past Anesthesia/Blood Transfusion Reaction / Comment(s): no hx blood 

transfusion


Date of Last Stent Placement:: 2006


Past Psychological History: No Psychological Hx Reported


Smoking Status: Former smoker


Past Alcohol Use History: None Reported


Additional Past Alcohol Use History / Comment(s): quit smoking ,smoked 

approx 40 yrs 1ppd


Past Drug Use History: None Reported





- Past Family History


  ** Mother


Family Medical History: Coronary Artery Disease (CAD)


Additional Family Medical History / Comment(s): mother passed away during CABG





  ** Father


Family Medical History: Cancer


Additional Family Medical History / Comment(s): colon CA





  ** Brother(s)


Family Medical History: Cancer


Additional Family Medical History / Comment(s): colon CA





  ** Sister(s)


Family Medical History: Cancer


Additional Family Medical History / Comment(s): breast CA





Medications and Allergies


 Home Medications











 Medication  Instructions  Recorded  Confirmed  Type


 


Aspirin EC [Ecotrin] 325 mg PO DAILY 18 History


 


Lisinopril [Zestril] 5 mg PO DAILY 18 History


 


metFORMIN HCL 1,000 mg PO BID 18 History


 


Glimepiride [Amaryl] 2 mg PO DAILY 19 History


 


Oxybutynin Chloride [Oxybutynin 10 mg PO DAILY 19 History





Chloride ER]    


 


Atorvastatin [Lipitor] 40 mg PO DAILY #30 tab 19 Rx


 


Metoprolol Tartrate [Lopressor] 50 mg PO BID #60 tab 19 Rx


 


Nitroglycerin Sl Tabs [Nitrostat] 0.4 mg SUBLINGUAL Q5M PRN #25 tab 19 Rx


 


Spironolactone [Aldactone] 12.5 mg PO DAILY #30 tab 19 Rx











 Allergies











Allergy/AdvReac Type Severity Reaction Status Date / Time


 


tetracycline Allergy  Rash/Hives Verified 19 14:02














Physical Exam


Vitals: 


 Vital Signs











  Temp Pulse Resp BP Pulse Ox


 


 19 09:00   81  33 H  107/59  95


 


 19 08:00   84  24  117/61  95


 


 19 07:54   88   


 


 19 07:43   86   


 


 19 07:00   86  13  117/63  96


 


 19 06:00   90  15  109/61  93 L


 


 19 05:00   88  12  125/81  93 L


 


 19 04:00  97.8 F  89  12  102/63  94 L


 


 19 03:00   85  13  115/61  93 L


 


 19 02:00   88  14  116/61  94 L


 


 19 01:00   87  18  106/69  93 L


 


 19 00:00  98.0 F  88  15  116/62  93 L


 


 19 23:00   89  14  119/60  93 L


 


 19 22:00   90  19  114/54  93 L


 


 19 21:00   104 H  32 H  117/64  96


 


 19 20:10   80   


 


 19 20:00  97.9 F  92  17  113/60  95


 


 19 19:58   84   


 


 19 19:00   89  27 H  122/65  97


 


 19 18:00   94  19  115/58  95


 


 19 17:00   93  12  116/62  93 L


 


 19 16:09   80   


 


 19 16:00  97 F L  87  14  112/58  99


 


 19 15:56   84   


 


 19 15:43    16  


 


 19 15:00   84  16  117/60  95


 


 19 14:00   90  18  98/50  96


 


 19 13:00   92  16  105/67  96


 


 19 12:00  97.3 F L  84  19  105/62  94 L


 


 19 11:20   80  14  


 


 19 11:10   84   


 


 19 11:00   90  14  104/59  94 L


 


 19 10:00   88  12  112/68  95








 Intake and Output











 19





 22:59 06:59 14:59


 


Intake Total 124.3 154.8 410


 


Output Total 450 550 


 


Balance -325.7 -395.2 410


 


Intake:   


 


  .3 154.8 10


 


    0.9 NS  30 10


 


    Dextrose/Water 1 500ml. 44.3 64.8 





    bag @ 1 MCG/KG/MIN 3.63   





    mls/hr IV .Q24H ONESIMO with   





    DOPamine DRIP 800 mg Rx#:   





    [id number]   


 


    Lactated Ringers 1,000 ml 80 60 





    @ 20 mls/hr IV .Q24H ONESIMO   





    Rx#:[id number]   


 


  Oral   400


 


Output:   


 


  Urine 450 550 


 


Other:   


 


  Voiding Method Bedside Commode Bedside Commode Bedside Commode


 


  # Voids 1  


 


  Weight   97.1 kg








 ABP, PAP, CO, CI - Last 8 Hours











Cardiac Output                 5.9


 


Cardiac Output                 5.9


 


Cardiac Output                 5.9


 


Cardiac Output                 5.9


 


Cardiac Output                 5.9


 


Cardiac Output                 5.9


 


Cardiac Output                 5.9


 


Cardiac Output                 5.9

















Results





- Lab Results


 Most recent lab results











ABG pH  7.32  (7.35-7.45)  L  19  18:57    


 


ABG pCO2  39 mmHg (35-45)   19  18:57    


 


ABG pO2  71 mmHg ()  L  19  18:57    


 


ABG HCO3  20 mmol/L (21-25)  L  19  18:57    


 


ABG O2 Saturation  94.4 % (94-97)   19  18:57    


 


Calcium  8.1 mg/dL (8.4-10.2)  L  19  04:49    


 


Phosphorus  5.1 mg/dL (2.5-4.5)  H  19  04:39    


 


Magnesium  3.6 mg/dL (1.6-2.3)  H  19  04:49    














 19 04:49





 19 04:49





Assessment and Plan


Plan: 





Assessment:


1.  Acute kidney injury secondary to ATN secondary to hemodynamic instability.  

Creatinine peaked at 2.0 this admission and is down to 1.57 today.


2.  Chronic kidney disease.  UA from earlier this month did reveal 1+ 

proteinuria which is likely secondary to underlying diabetic kidney disease.  

Need to establish baseline renal function.


3.  Coronary artery disease status post CABG on .


4.  Diabetes mellitus.


5.  Hyponatremia secondary to acute kidney injury.  Slightly hypervolemic.


6.  Systolic CHF with ejection fraction of 20-25% on echocardiogram done 2019.





Plan:


Status post 20 mg IV Lasix this morning.


Discontinue dopamine drip.


Discontinue milk of magnesia as magnesium level 3.6. 


Avoid [employer].


Continue to monitor renal function and urine output closely.


Monitor hemoglobin.





Thank you for the consultation.  I will continue to follow the patient with you 

during her hospital stay.

## 2019-01-30 LAB
ANION GAP SERPL CALC-SCNC: 4 MMOL/L
BUN SERPL-SCNC: 54 MG/DL (ref 7–17)
CALCIUM SPEC-MCNC: 8.1 MG/DL (ref 8.4–10.2)
CHLORIDE SERPL-SCNC: 105 MMOL/L (ref 98–107)
CO2 SERPL-SCNC: 23 MMOL/L (ref 22–30)
ERYTHROCYTE [DISTWIDTH] IN BLOOD BY AUTOMATED COUNT: 2.52 M/UL (ref 3.8–5.4)
ERYTHROCYTE [DISTWIDTH] IN BLOOD: 15.3 % (ref 11.5–15.5)
GLUCOSE BLD-MCNC: 144 MG/DL (ref 75–99)
GLUCOSE BLD-MCNC: 190 MG/DL (ref 75–99)
GLUCOSE BLD-MCNC: 212 MG/DL (ref 75–99)
GLUCOSE BLD-MCNC: 221 MG/DL (ref 75–99)
GLUCOSE SERPL-MCNC: 121 MG/DL (ref 74–99)
HCT VFR BLD AUTO: 22.5 % (ref 34–46)
HGB BLD-MCNC: 7 GM/DL (ref 11.4–16)
MAGNESIUM SPEC-SCNC: 3.7 MG/DL (ref 1.6–2.3)
MCH RBC QN AUTO: 27.7 PG (ref 25–35)
MCHC RBC AUTO-ENTMCNC: 31.1 G/DL (ref 31–37)
MCV RBC AUTO: 89.2 FL (ref 80–100)
PLATELET # BLD AUTO: 295 K/UL (ref 150–450)
POTASSIUM SERPL-SCNC: 5.4 MMOL/L (ref 3.5–5.1)
SODIUM SERPL-SCNC: 132 MMOL/L (ref 137–145)
WBC # BLD AUTO: 6.3 K/UL (ref 3.8–10.6)

## 2019-01-30 RX ADMIN — IPRATROPIUM BROMIDE AND ALBUTEROL SULFATE SCH: .5; 3 SOLUTION RESPIRATORY (INHALATION) at 12:27

## 2019-01-30 RX ADMIN — IPRATROPIUM BROMIDE AND ALBUTEROL SULFATE SCH: .5; 3 SOLUTION RESPIRATORY (INHALATION) at 12:08

## 2019-01-30 RX ADMIN — DILTIAZEM HYDROCHLORIDE SCH MG: 30 TABLET, FILM COATED ORAL at 23:39

## 2019-01-30 RX ADMIN — Medication SCH MG: at 07:21

## 2019-01-30 RX ADMIN — HEPARIN SODIUM SCH UNIT: 5000 INJECTION, SOLUTION INTRAVENOUS; SUBCUTANEOUS at 16:29

## 2019-01-30 RX ADMIN — IPRATROPIUM BROMIDE AND ALBUTEROL SULFATE SCH ML: .5; 3 SOLUTION RESPIRATORY (INHALATION) at 15:18

## 2019-01-30 RX ADMIN — SODIUM CHLORIDE, PRESERVATIVE FREE SCH ML: 5 INJECTION INTRAVENOUS at 21:20

## 2019-01-30 RX ADMIN — INSULIN ASPART SCH UNIT: 100 INJECTION, SOLUTION INTRAVENOUS; SUBCUTANEOUS at 12:52

## 2019-01-30 RX ADMIN — METOPROLOL TARTRATE SCH MG: 25 TABLET, FILM COATED ORAL at 16:29

## 2019-01-30 RX ADMIN — HYDROCODONE BITARTRATE AND ACETAMINOPHEN PRN EACH: 5; 325 TABLET ORAL at 21:16

## 2019-01-30 RX ADMIN — ONDANSETRON PRN MG: 2 INJECTION INTRAMUSCULAR; INTRAVENOUS at 05:21

## 2019-01-30 RX ADMIN — CLOPIDOGREL BISULFATE SCH MG: 75 TABLET ORAL at 08:30

## 2019-01-30 RX ADMIN — OXYCODONE HYDROCHLORIDE AND ACETAMINOPHEN SCH MG: 500 TABLET ORAL at 07:21

## 2019-01-30 RX ADMIN — HYDROCODONE BITARTRATE AND ACETAMINOPHEN PRN EACH: 5; 325 TABLET ORAL at 08:30

## 2019-01-30 RX ADMIN — METOPROLOL TARTRATE SCH MG: 25 TABLET, FILM COATED ORAL at 08:33

## 2019-01-30 RX ADMIN — HEPARIN SODIUM SCH UNIT: 5000 INJECTION, SOLUTION INTRAVENOUS; SUBCUTANEOUS at 23:39

## 2019-01-30 RX ADMIN — DILTIAZEM HYDROCHLORIDE SCH MG: 30 TABLET, FILM COATED ORAL at 08:30

## 2019-01-30 RX ADMIN — INSULIN ASPART SCH UNIT: 100 INJECTION, SOLUTION INTRAVENOUS; SUBCUTANEOUS at 18:39

## 2019-01-30 RX ADMIN — INSULIN ASPART SCH UNIT: 100 INJECTION, SOLUTION INTRAVENOUS; SUBCUTANEOUS at 07:21

## 2019-01-30 RX ADMIN — ASPIRIN 325 MG ORAL TABLET SCH MG: 325 PILL ORAL at 08:30

## 2019-01-30 RX ADMIN — DILTIAZEM HYDROCHLORIDE SCH MG: 30 TABLET, FILM COATED ORAL at 16:29

## 2019-01-30 RX ADMIN — SODIUM CHLORIDE, PRESERVATIVE FREE SCH: 5 INJECTION INTRAVENOUS at 08:31

## 2019-01-30 RX ADMIN — ATORVASTATIN CALCIUM SCH MG: 40 TABLET, FILM COATED ORAL at 08:31

## 2019-01-30 RX ADMIN — IPRATROPIUM BROMIDE AND ALBUTEROL SULFATE SCH ML: .5; 3 SOLUTION RESPIRATORY (INHALATION) at 08:41

## 2019-01-30 RX ADMIN — OXYCODONE HYDROCHLORIDE AND ACETAMINOPHEN SCH MG: 500 TABLET ORAL at 16:29

## 2019-01-30 RX ADMIN — INSULIN ASPART SCH UNIT: 100 INJECTION, SOLUTION INTRAVENOUS; SUBCUTANEOUS at 21:15

## 2019-01-30 RX ADMIN — Medication SCH MG: at 16:29

## 2019-01-30 RX ADMIN — HEPARIN SODIUM SCH UNIT: 5000 INJECTION, SOLUTION INTRAVENOUS; SUBCUTANEOUS at 08:30

## 2019-01-30 RX ADMIN — IPRATROPIUM BROMIDE AND ALBUTEROL SULFATE SCH ML: .5; 3 SOLUTION RESPIRATORY (INHALATION) at 19:32

## 2019-01-30 RX ADMIN — PANTOPRAZOLE SODIUM SCH MG: 40 TABLET, DELAYED RELEASE ORAL at 07:21

## 2019-01-30 RX ADMIN — INSULIN DETEMIR SCH UNIT: 100 INJECTION, SOLUTION SUBCUTANEOUS at 21:16

## 2019-01-30 NOTE — XR
EXAMINATION TYPE: XR chest 1V portable

 

DATE OF EXAM: 1/30/2019

 

COMPARISON: Prior chest x-ray 1/29/2019

 

HISTORY: Status post cardiac surgery, abnormal chest x-ray

 

TECHNIQUE: Single frontal view of the chest is obtained.

 

FINDINGS:  Findings are similar to prior. The heart remains enlarged. Blunting the costophrenic angle
s persists, interstitium is increased. No evident pneumothorax. Patchy bibasilar density again noted.
 There are cardiac leads.

 

IMPRESSION:  Correlate for volume overload, pulmonary venous hypertension and interstitial edema. Pro
bable small effusions. There is likely associated atelectatic changes, pneumonia not excluded.

## 2019-01-30 NOTE — P.PN
Subjective


Progress Note Date: 01/30/19


Principal diagnosis: 





Status post open heart with CABG





This is a pleasant 68-year-old  female patient with past medical 

history the patient does have coronary artery disease with prior coronary 

artery stenting, diabetes type 2, hypertension, and dyslipidemia.who we 

consulted to see for cardiac follow-up after coronary artery bypass grafting.





Earlier this month, January 2019, the patient presented to the hospital with 

shortness of breath.  She was ruled in for acute non-ST deviation myocardial 

infarction with abnormal EKG as well as abnormal cardiac enzymes.  At that 

point she underwent heart catheterization by Dr. Thayer and that revealed severe 

triple-vessel coronary artery disease.  Because of that the patient was 

referred to undergo coronary artery bypass grafting.





The patient underwent yesterday coronary artery bypass grafting 3 where she 

received LIMA to LAD, HUDSON to OM, and radial artery to right coronary artery.





The last echocardiogram from January 2019 revealed impaired LV function with EF 

between 20-25% with basal inferior hypokinesia and inferoseptal hypokinesia.





On follow-up with the patient today, January 30 of 2019, the patient is doing 

good clinically.  She was seen yesterday by the nephrology service.  She is off 

dopamine.  Hemodynamically she continues to be stable as well. The creatinine 

has improved and currently is 1.52.  The hemoglobin is 7.0.  She continues to 

be on dual antiplatelet therapy along with a statin.  The chest x-ray was 

reviewed with and showed small left pleural effusion.











Objective





- Vital Signs


Vital signs: 


 Vital Signs











Temp  97.7 F   01/30/19 04:00


 


Pulse  88   01/30/19 05:00


 


Resp  13   01/30/19 05:00


 


BP  117/63   01/30/19 05:00


 


Pulse Ox  95   01/30/19 05:00








 Intake & Output











 01/29/19 01/29/19 01/30/19





 06:59 18:59 06:59


 


Intake Total 219.1 760 


 


Output Total 750 800 500


 


Balance -530.9 -40 -500


 


Weight  97.1 kg 96.8 kg


 


Intake:   


 


  .1 310 


 


    0.9 NS 30 310 


 


    Dextrose/Water 1 500ml. 89.1  





    bag @ 1 MCG/KG/MIN 3.63   





    mls/hr IV .Q24H ONESIMO with   





    DOPamine DRIP 800 mg Rx#:   





    [id number]   


 


    Lactated Ringers 1,000 ml 100  





    @ 20 mls/hr IV .Q24H ONESIMO   





    Rx#:[id number]   


 


  Oral  450 


 


Output:   


 


  Urine 750 800 500


 


Other:   


 


  Voiding Method Bedside Commode Bedside Commode Bedside Commode


 


  # Voids   1


 


  # Bowel Movements   1








 ABP, PAP, CO, CI - Last Documented











Arterial Blood Pressure        102/52


 


Pulmonary Artery Pressure      39/22


 


Cardiac Output                 5.9


 


Cardiac Index                  2.9

















- Constitutional


General appearance: Present: no acute distress





- Respiratory


Respiratory: bilateral: CTA





- Cardiovascular


Rhythm: regular


Heart sounds: normal: S1, S2





- Labs


CBC & Chem 7: 


 01/30/19 04:32





 01/30/19 04:32


Labs: 


 Abnormal Lab Results - Last 24 Hours (Table)











  01/29/19 01/29/19 01/29/19 Range/Units





  04:49 06:57 11:41 


 


RBC     (3.80-5.40)  m/uL


 


Hgb     (11.4-16.0)  gm/dL


 


Hct     (34.0-46.0)  %


 


Sodium  132 L    (137-145)  mmol/L


 


Potassium     (3.5-5.1)  mmol/L


 


BUN  55 H    (7-17)  mg/dL


 


Creatinine  1.57 H    (0.52-1.04)  mg/dL


 


Glucose  154 H    (74-99)  mg/dL


 


POC Glucose (mg/dL)   151 H  138 H  (75-99)  mg/dL


 


Calcium  8.1 L    (8.4-10.2)  mg/dL


 


Magnesium  3.6 H    (1.6-2.3)  mg/dL














  01/29/19 01/29/19 01/30/19 Range/Units





  17:22 20:49 04:32 


 


RBC    2.52 L  (3.80-5.40)  m/uL


 


Hgb    7.0 L  (11.4-16.0)  gm/dL


 


Hct    22.5 L  (34.0-46.0)  %


 


Sodium     (137-145)  mmol/L


 


Potassium     (3.5-5.1)  mmol/L


 


BUN     (7-17)  mg/dL


 


Creatinine     (0.52-1.04)  mg/dL


 


Glucose     (74-99)  mg/dL


 


POC Glucose (mg/dL)  199 H  183 H   (75-99)  mg/dL


 


Calcium     (8.4-10.2)  mg/dL


 


Magnesium     (1.6-2.3)  mg/dL














  01/30/19 Range/Units





  04:32 


 


RBC   (3.80-5.40)  m/uL


 


Hgb   (11.4-16.0)  gm/dL


 


Hct   (34.0-46.0)  %


 


Sodium  132 L  (137-145)  mmol/L


 


Potassium  5.4 H  (3.5-5.1)  mmol/L


 


BUN  54 H  (7-17)  mg/dL


 


Creatinine  1.52 H  (0.52-1.04)  mg/dL


 


Glucose  121 H  (74-99)  mg/dL


 


POC Glucose (mg/dL)   (75-99)  mg/dL


 


Calcium  8.1 L  (8.4-10.2)  mg/dL


 


Magnesium  3.7 H  (1.6-2.3)  mg/dL














Assessment and Plan


Assessment: 





Assessment


#1 severe underlying coronary artery disease as described above


#2 status post coronary artery bypass grafting 3 as described above


#3 diabetes type 2


#4 hypertension


#5 dyslipidemia





Plan


#1 continue the current medical regimen including dual antiplatelet therapy 

along with statin and metoprolol.  


#2 continue monitor the hemoglobin as well as BUN and electrolytes


#3 continue daily chest x-ray


#4 I do feel that the patient might benefit from small dose of Lasix.








Thank you for allowing us participate in her care and we will continue 

following up with the patient

## 2019-01-30 NOTE — PN
PROGRESS NOTE



DATE OF SERVICE:

01/30/2019



This is a 68-year-old female admitted back on January 24.  She is status post 3-vessel

bypass grafting.  She is postop day #6.  Each day, she does a bit better.  She is

feeling stronger today.  She remains on O2 at 2 L.  She is not receiving any IV fluids.

The patient is not receiving any dopamine again.  The patient was on dopamine, but that

has been weaned down.  The patient does have a history of diabetes mellitus,

hyperlipidemia, iron deficiency anemia, peripheral vascular occlusive disease,

myocardial infarction, and a previous history of tobacco dependence.  The patient did

smoke 40 years at 1 pack a day.  Anyway, she is doing 750 on her incentive spirometer.

We got her deep breathing coughing and clearing of secretions.  Chest tubes have all

been removed.



Current vital signs are reviewed.  Temperature is 97.6, heart rate 88, respiratory rate

14, blood pressure 116/62, mean 80, saturations are 95/96 percent on 2 L and on room

air.  She is anywhere from 90-94 percent.  Appears in no acute distress.  Sitting at

the bedside.

HEENT examination is grossly unremarkable.  Nasal O2 in place.  Neck is supple.  Full

range of motion.  No adenopathy or thyromegaly.  Neck veins are flat.  Cardiovascular

examination reveals regular rhythm and rate.  S1, S2 normal.  No S3, S4, or murmur.

Heart sounds are distant.  Lungs reveal mostly clear breath sounds.  A few scattered

rhonchi.  No wheezes or crackles.  Abdomen is soft.  Bowel sounds are heard.

Extremities are intact.  No cyanosis, clubbing, or edema.



LABS:

Reviewed.  White count 6.3, hemoglobin 7, hematocrit 22.5, platelet count 395,000,

sodium 132, potassium 5.4, chloride is 105, CO2 is 23, BUN and creatinine were 54 and

1.52.



Microbiologic studies are negative.



X-RAY:

Was reviewed.  Chest x-ray shows some mild fluid overload and pulmonary venous

hypertension with mild interstitial edema and small pleural effusions.



ASSESSMENT:

1. Postoperative day #6, status post 3-vessel bypass grafting.

2. Routine postoperative ventilator management, resolved.

3. Coronary artery disease.

4. Ischemic cardiomyopathy with left ventricular dysfunction.

5. Chronic systolic heart failure.

6. Chronic obstructive pulmonary disease with an FEV1 at 56% of predicted/stage 2

    disease.

7. Postoperative atelectasis and pleural effusions.

8. History of diabetes mellitus.

9. History of hyperlipidemia.

10.History of iron deficiency anemia.

11.PVOD.

12.History of myocardial infarction.

13.Previous history of 40 years of tobacco use.



PLAN:

Again we continued to have the patient do incentive spirometry q.1 hour while awake.

In addition, we recommend deep breathing coughing and clearing of secretions.  She

remains on updrafts.  Microbiologic studies are negative.  Chest x-ray was reviewed.

Renal functions improved.  Overall, I think she is doing better.  She is getting ones

that she is getting 750 on the incentive spirometer.  Discharge planning is underway.

I am not sure where she will be discharged to and when she will be discharged.  We will

continue to follow.





IVETTE / ROX: [id number] / Job#: [id number]

## 2019-01-30 NOTE — P.PN
Subjective


Progress Note Date: 01/30/19


Principal diagnosis: 





Coronary artery disease, chronic systolic and diastolic heart failure with 

preoperative ejection fraction 20-25%, ischemic cardiomyopathy and LV 

dysfunction.  Previous medical history of non-STEMI earlier this month as well 

as in 2006 with stent placement at that time, hyperlipidemia, peripheral artery 

disease with stent placement to the left lower extremity in 2016, chronic 

kidney disease with baseline creatinine 1.3-1.5, recent vein surgery to 

bilateral lower extremity is, non-insulin-dependent diabetes mellitus with 

preoperative hemoglobin A1c 7.1%, shingles, obesity, moderate COPD with 

preoperative FEV1 51% of predicted, previous tobacco dependence, and family 

history of early coronary artery disease with mother dying before the age of 60 

years old during coronary artery bypass graft surgery.  Preoperative nasal swab 

positive for MSSA.  Preoperative normocytic, normochromic anemia.





POD #6 off-pump coronary artery bypass graft surgery 3 with the left internal 

mammary artery artery graft to the left anterior descending artery, right 

radial artery graft to the obtuse marginal and the left radial arterial graft 

to the posterior descending artery, bilateral endovascular radial artery 

harvest.  Intraoperative transesophageal echocardiogram by anesthesia.





Postoperative acute blood loss anemia, expected outcome.





Acute kidney injury, nonoliguric, potential outcome given patient's baseline 

chronic kidney disease.





She is currently sitting up in the recliner in no acute distress.  Denies any 

pain at this time, states pain is well controlled on current medication 

regimen.  Denies shortness of breath.  Has ambulated in the hallway.  Dopamine 

discontinued yesterday, remains hemodynamically stable.  Clinically she looks 

very good and states she is feeling better every day, no new complaints.





Objective





- Vital Signs


Vital signs: 


 Vital Signs











Temp  97.6 F   01/30/19 08:00


 


Pulse  88   01/30/19 08:51


 


Resp  14   01/30/19 08:42


 


BP  116/62   01/30/19 08:00


 


Pulse Ox  92 L  01/30/19 08:42








 Intake & Output











 01/29/19 01/30/19 01/30/19





 18:59 06:59 18:59


 


Intake Total 760  


 


Output Total 800 500 0


 


Balance -40 -500 0


 


Weight 97.1 kg 96.8 kg 


 


Intake:   


 


    


 


    0.9   


 


  Oral 450  


 


Output:   


 


  Urine 800 500 0


 


Other:   


 


  Voiding Method Bedside Commode Bedside Commode 


 


  # Voids  1 


 


  # Bowel Movements  1 








 ABP, PAP, CO, CI - Last Documented











Arterial Blood Pressure        102/52


 


Pulmonary Artery Pressure      39/22


 


Cardiac Output                 5.9


 


Cardiac Index                  2.9

















- Constitutional


General appearance: Present: cooperative, no acute distress, obese





- Respiratory


Details: 





Lungs sounds diminished bilaterally, coarse breath sounds in the bases.  

Respirations even, nonlabored.  Currently on 2 L nasal cannula with oxygen 

saturation 96%.  Able to achieve 750 mL on her incentive spirometry.  Strong 

productive cough.








- Cardiovascular


Details: 





S1, S2 present.  Regular rate and rhythm, sinus rhythm on telemetry.   Sternum 

stable.  Palpable peripheral pulses bilaterally.  Trace generalized edema 

present.  No calf pain or tenderness noted.  Heart hugger in place with patient 

demonstrating appropriate use.  Antiembolism stockings, SCDs present.  








- Gastrointestinal


Gastrointestinal Comment(s): 





Abdomen soft, nontender, nondistended.  Active bowel sounds present 4 

quadrants.  Tolerating diet.  Positive bowel movement this morning.








- Genitourinary


Genitourinary Comment(s): 





Continues to void clear, yellow urine.  Output overnight was 300 mL.








- Integumentary


Integumentary Comment(s): 





Skin is warm and dry with evidence of good perfusion.  Anterior chest incision 

well approximated and covered with dry intact dressing.  Bilateral radial 

artery harvest sites well approximated, positive feeling without numbness or 

tingling in all fingers, able to move both hands without difficulty.





- Neurologic


Neurologic: Present: CNII-XII intact





- Musculoskeletal


Musculoskeletal: Present: gait normal, strength equal bilaterally





- Psychiatric


Psychiatric: Present: A&O x's 3, appropriate affect, intact judgment & insight





- Allied health notes


Allied health notes reviewed: nursing





- Labs


CBC & Chem 7: 


 01/30/19 04:32





 01/30/19 04:32


Labs: 


 Abnormal Lab Results - Last 24 Hours (Table)











  01/29/19 01/29/19 01/29/19 Range/Units





  11:41 17:22 20:49 


 


RBC     (3.80-5.40)  m/uL


 


Hgb     (11.4-16.0)  gm/dL


 


Hct     (34.0-46.0)  %


 


Sodium     (137-145)  mmol/L


 


Potassium     (3.5-5.1)  mmol/L


 


BUN     (7-17)  mg/dL


 


Creatinine     (0.52-1.04)  mg/dL


 


Glucose     (74-99)  mg/dL


 


POC Glucose (mg/dL)  138 H  199 H  183 H  (75-99)  mg/dL


 


Calcium     (8.4-10.2)  mg/dL


 


Magnesium     (1.6-2.3)  mg/dL














  01/30/19 01/30/19 01/30/19 Range/Units





  04:32 04:32 07:14 


 


RBC  2.52 L    (3.80-5.40)  m/uL


 


Hgb  7.0 L    (11.4-16.0)  gm/dL


 


Hct  22.5 L    (34.0-46.0)  %


 


Sodium   132 L   (137-145)  mmol/L


 


Potassium   5.4 H   (3.5-5.1)  mmol/L


 


BUN   54 H   (7-17)  mg/dL


 


Creatinine   1.52 H   (0.52-1.04)  mg/dL


 


Glucose   121 H   (74-99)  mg/dL


 


POC Glucose (mg/dL)    144 H  (75-99)  mg/dL


 


Calcium   8.1 L   (8.4-10.2)  mg/dL


 


Magnesium   3.7 H   (1.6-2.3)  mg/dL














- Imaging and Cardiology


Chest x-ray: report reviewed, image reviewed





Assessment and Plan


(1) Coronary artery disease


Current Visit: Yes   Status: Chronic   Code(s): I25.10 - ATHSCL HEART DISEASE 

OF NATIVE CORONARY ARTERY W/O ANG PCTRS   SNOMED Code(s): 57911572


   





(2) Ischemic cardiomyopathy


Current Visit: Yes   Status: Chronic   Code(s): I25.5 - ISCHEMIC CARDIOMYOPATHY

   SNOMED Code(s): 054201575


   





(3) Status post three vessel coronary artery bypass


Current Visit: Yes   Status: Acute   Code(s): Z95.1 - PRESENCE OF AORTOCORONARY 

BYPASS GRAFT   SNOMED Code(s): 668585097


   





(4) Systolic heart failure


Current Visit: Yes   Status: Chronic   Code(s): I50.20 - UNSPECIFIED SYSTOLIC (

CONGESTIVE) HEART FAILURE   SNOMED Code(s): 658766758


   





(5) COPD (chronic obstructive pulmonary disease)


Current Visit: Yes   Status: Chronic   Code(s): J44.9 - CHRONIC OBSTRUCTIVE 

PULMONARY DISEASE, UNSPECIFIED   SNOMED Code(s): 10535330


   





(6) Diabetes mellitus


Current Visit: Yes   Status: Chronic   Code(s): E11.9 - TYPE 2 DIABETES 

MELLITUS WITHOUT COMPLICATIONS   SNOMED Code(s): 08285520


   





(7) History of heart artery stent


Current Visit: Yes   Status: Chronic   Code(s): Z95.5 - PRESENCE OF CORONARY 

ANGIOPLASTY IMPLANT AND GRAFT   SNOMED Code(s): 611368103


   





(8) Hyperlipidemia


Current Visit: Yes   Status: Chronic   Code(s): E78.5 - HYPERLIPIDEMIA, 

UNSPECIFIED   SNOMED Code(s): 13656606


   





(9) Iron deficiency anemia


Current Visit: Yes   Status: Chronic   Code(s): D50.9 - IRON DEFICIENCY ANEMIA, 

UNSPECIFIED   SNOMED Code(s): 60269191


   





(10) Obesity (BMI 30.0-34.9)


Current Visit: Yes   Status: Chronic   Code(s): E66.9 - OBESITY, UNSPECIFIED   

SNOMED Code(s): 979616582299278


   





(11) Peripheral artery disease


Current Visit: Yes   Status: Chronic   Code(s): I73.9 - PERIPHERAL VASCULAR 

DISEASE, UNSPECIFIED   SNOMED Code(s): 658207963


   





(12) History of myocardial infarction, greater than 8 weeks ago


Current Visit: No   Status: Resolved   Code(s): I25.2 - OLD MYOCARDIAL 

INFARCTION   SNOMED Code(s): 2173008


   





(13) Tobacco dependence in remission


Current Visit: No   Status: Resolved   Code(s): F17.201 - NICOTINE DEPENDENCE, 

UNSPECIFIED, IN REMISSION   SNOMED Code(s): 799795461


   


Plan: 





1.  Continue aspirin, statin, Plavix, beta blocker therapy.  Will increase beta 

blocker therapy as tolerated, will increase to 25 mg 3 times daily today.


2.  Nephrology consulted, appreciate recommendations.  No nephrotoxic agents.  

Will give 20 mg IV Lasix today, okay with nephrology.


3.  Continue oral Cardizem for radial artery spasm.  


4.  Wean O2 as tolerated.  Encourage incentive spirometry is 10 times every 

hour while awake.


5.  Increase activity, ambulate as tolerated.  PT/OT/cardiac rehab following.


6.  Pain control with current medication regimen.  No Toradol secondary to 

chronic renal insufficiency.


7.  Bronchodilators per pulmonology.


8.  Insulin management per primary care service.


9.  GI prophylaxis with Protonix, DVT prophylaxis with subcu heparin, SCDs.


10.  Will monitor daily labs and x-rays.  Electrolyte replacement per protocol.


11.  Transfer orders placed for 3 S. cardiac stepdown unit, may transfer when 

bed available.


12.  Dr. Gallego was consulted yesterday for inpatient rehab admission.  Await 

insurance authorization.  May transfer to inpatient rehab as soon as 

authorization is obtained and bed is available.


13.  More recommendations to follow.


Time with Patient: Greater than 30

## 2019-01-30 NOTE — P.PN
Subjective





this is a pleasant 69 yo F wiht pmh of CHF and low EF: 20-25% and chronic 

diabetic kidney disease, who is status post CABG on 1/24/19, she developed 

acute kidney injury. she is lying in chair with no chest pain , no dyspnea, no 

abdominal pain , no nausea or vomiting . she is hemodynamically stable with no 

fever. her hemoglobin is 7.1 which is expected after surgery and is put on iron 

pill.her creatinine is 1.5 , baseline around 1.2-1.3, it went up to 2.0 and now 

is improving , pt is been followed by nephrology team , also has mild 

hyponatremia. sugar is controlled. 


pt is planed to go to ECF upon discharge.





01/30/2019


Patient is clinically stable, same as yesterday with no dyspnea or chest pain.  

Tolerating diet well.  Vitals stable ..  Labs reviewed showing unremarkable 

CBC.  Her sodium is 132, potassium 5.43 creatinine 1.5.  Magnesium 3.7.  

Calcium 8.1.  Sugar is controlled.  Repeat chest x-ray showing pulmonary venous 

hypertension and interstitial edema with probable small effusion and there is 

likely associated atelectasis changes and pneumonia not excluded per radiology 

report.  However patient has no worsening symptoms.  Patient is planned to be 

discharged to [facility] rehab.  Patient insurance authorization is pending.





Objective





- Vital Signs


Vital signs: 


 Vital Signs











Temp  97.6 F   01/30/19 08:00


 


Pulse  80   01/30/19 13:00


 


Resp  12   01/30/19 13:00


 


BP  113/64   01/30/19 13:00


 


Pulse Ox  96   01/30/19 13:00








 Intake & Output











 01/29/19 01/30/19 01/30/19





 18:59 06:59 18:59


 


Intake Total 760  140


 


Output Total 800 500 490


 


Balance -40 -500 -350


 


Weight 97.1 kg 96.8 kg 


 


Intake:   


 


    


 


    0.9   


 


  Oral 450  140


 


Output:   


 


  Urine 800 500 490


 


Other:   


 


  Voiding Method Bedside Commode Bedside Commode Bedside Commode


 


  # Voids  1 


 


  # Bowel Movements  1 








 ABP, PAP, CO, CI - Last Documented











Arterial Blood Pressure        102/52


 


Pulmonary Artery Pressure      39/22


 


Cardiac Output                 5.9


 


Cardiac Index                  2.9

















- Exam





GENERAL: The patient is alert and oriented x3, not in any acute distress. Well 

developed, well nourished. 


HEENT: Pupils are round and equally reacting to light. EOMI. No scleral 

icterus. No conjunctival pallor. Normocephalic, atraumatic. No pharyngeal 

erythema. No thyromegaly. 


CARDIOVASCULAR: S1 and S2 present. No murmurs, rubs, or gallops. 


PULMONARY: Chest is clear to auscultation, no wheezing or crackles. 


ABDOMEN: Soft, nontender, nondistended, normoactive bowel sounds. No palpable 

organomegaly.  MUSCULOSKELETAL: No joint swelling or deformity.


EXTREMITIES: No cyanosis, clubbing, or pedal edema. 


NEUROLOGICAL: Gross neurological examination did not reveal any focal deficits. 


SKIN: No rashes.





- Labs


CBC & Chem 7: 


 01/30/19 04:32





 01/30/19 04:32


Labs: 


 Abnormal Lab Results - Last 24 Hours (Table)











  01/29/19 01/29/19 01/30/19 Range/Units





  17:22 20:49 04:32 


 


RBC    2.52 L  (3.80-5.40)  m/uL


 


Hgb    7.0 L  (11.4-16.0)  gm/dL


 


Hct    22.5 L  (34.0-46.0)  %


 


Sodium     (137-145)  mmol/L


 


Potassium     (3.5-5.1)  mmol/L


 


BUN     (7-17)  mg/dL


 


Creatinine     (0.52-1.04)  mg/dL


 


Glucose     (74-99)  mg/dL


 


POC Glucose (mg/dL)  199 H  183 H   (75-99)  mg/dL


 


Calcium     (8.4-10.2)  mg/dL


 


Magnesium     (1.6-2.3)  mg/dL














  01/30/19 01/30/19 01/30/19 Range/Units





  04:32 07:14 11:45 


 


RBC     (3.80-5.40)  m/uL


 


Hgb     (11.4-16.0)  gm/dL


 


Hct     (34.0-46.0)  %


 


Sodium  132 L    (137-145)  mmol/L


 


Potassium  5.4 H    (3.5-5.1)  mmol/L


 


BUN  54 H    (7-17)  mg/dL


 


Creatinine  1.52 H    (0.52-1.04)  mg/dL


 


Glucose  121 H    (74-99)  mg/dL


 


POC Glucose (mg/dL)   144 H  190 H  (75-99)  mg/dL


 


Calcium  8.1 L    (8.4-10.2)  mg/dL


 


Magnesium  3.7 H    (1.6-2.3)  mg/dL














Assessment and Plan


Assessment: 





coronary artery diseases status post CABG


acute kidney injury 


chronic kidney disease secondary to diabetic nephropathy


chronic congestive heart failure with EF 20-2%


Plan: 





this is a pleasatn 69 yo F , she is status post CABG and acute kidney injury, 

pt is improving . continue with statin , plavix , insulin


continue with the same treatment , continue with symptomatic treatment , resume 

home medication , monitor lytes and vitals including glucose , c/w iv fluids, 

cardiology consult is appreciated. infectious disease consult is appreciated . c

/w same antibioitc . GI and DVT prophylaxis , further recommendation based upon 

pt clinical course and progress


DVT prophylaxis subcutaneous heparin


GI prophylaxis Protonix


PT/OT: pending 


Prognosis is guarded

## 2019-01-30 NOTE — P.PN
Subjective





Patient is seen in follow-up for acute kidney injury.  Unclear as to what her 

baseline renal function is.  Recently her creatinine has been in the range of 

1.2-1.3.  She underwent CABG on January 24.  Currently resting in bed.  No 

active chest pain or shortness of breath.  Good urine output.  Oral intake is 

fair.





Vital signs are stable.


General: The patient appeared well nourished and normally developed. 


HEENT: Head exam is unremarkable. Neck is without jugular venous distension.


LUNGS: Lungs are clear to auscultation and percussion. Breath sounds decreased.


HEART: Rate and Rhythm are regular. First and second heart sounds normal. No 

murmurs, rubs or gallops. 


ABDOMEN: Abdominal exam reveals normal bowel sounds. Non-tender and non-

distended. No evidence of peritonitis.


EXTREMITITES: No clubbing, cyanosis, or edema.





Objective





- Vital Signs


Vital signs: 


 Vital Signs











Temp  97.6 F   01/30/19 08:00


 


Pulse  88   01/30/19 08:51


 


Resp  14   01/30/19 08:42


 


BP  116/62   01/30/19 08:00


 


Pulse Ox  92 L  01/30/19 08:42








 Intake & Output











 01/29/19 01/30/19 01/30/19





 18:59 06:59 18:59


 


Intake Total 760  


 


Output Total 800 500 0


 


Balance -40 -500 0


 


Weight 97.1 kg 96.8 kg 


 


Intake:   


 


    


 


    0.9   


 


  Oral 450  


 


Output:   


 


  Urine 800 500 0


 


Other:   


 


  Voiding Method Bedside Commode Bedside Commode 


 


  # Voids  1 


 


  # Bowel Movements  1 








 ABP, PAP, CO, CI - Last Documented











Arterial Blood Pressure        102/52


 


Pulmonary Artery Pressure      39/22


 


Cardiac Output                 5.9


 


Cardiac Index                  2.9

















- Labs


CBC & Chem 7: 


 01/30/19 04:32





 01/30/19 04:32


Labs: 


 Abnormal Lab Results - Last 24 Hours (Table)











  01/29/19 01/29/19 01/29/19 Range/Units





  11:41 17:22 20:49 


 


RBC     (3.80-5.40)  m/uL


 


Hgb     (11.4-16.0)  gm/dL


 


Hct     (34.0-46.0)  %


 


Sodium     (137-145)  mmol/L


 


Potassium     (3.5-5.1)  mmol/L


 


BUN     (7-17)  mg/dL


 


Creatinine     (0.52-1.04)  mg/dL


 


Glucose     (74-99)  mg/dL


 


POC Glucose (mg/dL)  138 H  199 H  183 H  (75-99)  mg/dL


 


Calcium     (8.4-10.2)  mg/dL


 


Magnesium     (1.6-2.3)  mg/dL














  01/30/19 01/30/19 01/30/19 Range/Units





  04:32 04:32 07:14 


 


RBC  2.52 L    (3.80-5.40)  m/uL


 


Hgb  7.0 L    (11.4-16.0)  gm/dL


 


Hct  22.5 L    (34.0-46.0)  %


 


Sodium   132 L   (137-145)  mmol/L


 


Potassium   5.4 H   (3.5-5.1)  mmol/L


 


BUN   54 H   (7-17)  mg/dL


 


Creatinine   1.52 H   (0.52-1.04)  mg/dL


 


Glucose   121 H   (74-99)  mg/dL


 


POC Glucose (mg/dL)    144 H  (75-99)  mg/dL


 


Calcium   8.1 L   (8.4-10.2)  mg/dL


 


Magnesium   3.7 H   (1.6-2.3)  mg/dL














Assessment and Plan


Plan: 





Assessment:


1.  Acute kidney injury secondary to ATN secondary to hemodynamic instability.  

Creatinine peaked at 2.0 this admission and is down to 1.52 today.


2.  Chronic kidney disease.  UA from earlier this month did reveal 1+ 

proteinuria which is likely secondary to underlying diabetic kidney disease.  

Need to establish baseline renal function.


3.  Coronary artery disease status post CABG on January 24.


4.  Diabetes mellitus.


5.  Hyponatremia secondary to acute kidney injury.  Slightly hypervolemic.


6.  Systolic CHF with ejection fraction of 20-25% on echocardiogram done 01/14/ 2019.


7.  Mild hyperkalemia secondary to acute kidney injury.  No significant 

acidosis.  Blood sugars not too high either.





Plan:


Status post 20 mg IV Lasix this morning.


Discontinued milk of magnesia as magnesium level elevated.


Avoid [employer].


Continue to monitor renal function and urine output closely.


Monitor hemoglobin.


Potential transfer to inpatient rehab today.

## 2019-01-31 VITALS — RESPIRATION RATE: 16 BRPM | SYSTOLIC BLOOD PRESSURE: 99 MMHG | DIASTOLIC BLOOD PRESSURE: 63 MMHG

## 2019-01-31 VITALS — HEART RATE: 93 BPM

## 2019-01-31 VITALS — TEMPERATURE: 98.8 F

## 2019-01-31 LAB
ANION GAP SERPL CALC-SCNC: 3 MMOL/L
BUN SERPL-SCNC: 51 MG/DL (ref 7–17)
CALCIUM SPEC-MCNC: 8.2 MG/DL (ref 8.4–10.2)
CHLORIDE SERPL-SCNC: 106 MMOL/L (ref 98–107)
CO2 SERPL-SCNC: 25 MMOL/L (ref 22–30)
ERYTHROCYTE [DISTWIDTH] IN BLOOD BY AUTOMATED COUNT: 2.39 M/UL (ref 3.8–5.4)
ERYTHROCYTE [DISTWIDTH] IN BLOOD: 15.3 % (ref 11.5–15.5)
GLUCOSE BLD-MCNC: 113 MG/DL (ref 75–99)
GLUCOSE BLD-MCNC: 118 MG/DL (ref 75–99)
GLUCOSE BLD-MCNC: 216 MG/DL (ref 75–99)
GLUCOSE SERPL-MCNC: 101 MG/DL (ref 74–99)
HCT VFR BLD AUTO: 21.8 % (ref 34–46)
HGB BLD-MCNC: 6.8 GM/DL (ref 11.4–16)
MAGNESIUM SPEC-SCNC: 3.4 MG/DL (ref 1.6–2.3)
MCH RBC QN AUTO: 28.5 PG (ref 25–35)
MCHC RBC AUTO-ENTMCNC: 31.3 G/DL (ref 31–37)
MCV RBC AUTO: 91 FL (ref 80–100)
PLATELET # BLD AUTO: 335 K/UL (ref 150–450)
POTASSIUM SERPL-SCNC: 5.3 MMOL/L (ref 3.5–5.1)
SODIUM SERPL-SCNC: 134 MMOL/L (ref 137–145)
WBC # BLD AUTO: 6.1 K/UL (ref 3.8–10.6)

## 2019-01-31 RX ADMIN — ATORVASTATIN CALCIUM SCH MG: 40 TABLET, FILM COATED ORAL at 08:26

## 2019-01-31 RX ADMIN — IPRATROPIUM BROMIDE AND ALBUTEROL SULFATE SCH ML: .5; 3 SOLUTION RESPIRATORY (INHALATION) at 07:39

## 2019-01-31 RX ADMIN — PANTOPRAZOLE SODIUM SCH MG: 40 TABLET, DELAYED RELEASE ORAL at 06:40

## 2019-01-31 RX ADMIN — IPRATROPIUM BROMIDE AND ALBUTEROL SULFATE SCH ML: .5; 3 SOLUTION RESPIRATORY (INHALATION) at 11:03

## 2019-01-31 RX ADMIN — INSULIN ASPART SCH UNIT: 100 INJECTION, SOLUTION INTRAVENOUS; SUBCUTANEOUS at 13:14

## 2019-01-31 RX ADMIN — METOPROLOL TARTRATE SCH MG: 25 TABLET, FILM COATED ORAL at 08:26

## 2019-01-31 RX ADMIN — OXYCODONE HYDROCHLORIDE AND ACETAMINOPHEN SCH MG: 500 TABLET ORAL at 06:40

## 2019-01-31 RX ADMIN — SODIUM CHLORIDE, PRESERVATIVE FREE SCH ML: 5 INJECTION INTRAVENOUS at 08:27

## 2019-01-31 RX ADMIN — INSULIN ASPART SCH: 100 INJECTION, SOLUTION INTRAVENOUS; SUBCUTANEOUS at 06:46

## 2019-01-31 RX ADMIN — METOPROLOL TARTRATE SCH MG: 25 TABLET, FILM COATED ORAL at 02:02

## 2019-01-31 RX ADMIN — ASPIRIN 325 MG ORAL TABLET SCH MG: 325 PILL ORAL at 08:26

## 2019-01-31 RX ADMIN — CLOPIDOGREL BISULFATE SCH MG: 75 TABLET ORAL at 08:27

## 2019-01-31 RX ADMIN — Medication SCH MG: at 06:40

## 2019-01-31 RX ADMIN — HEPARIN SODIUM SCH UNIT: 5000 INJECTION, SOLUTION INTRAVENOUS; SUBCUTANEOUS at 08:26

## 2019-01-31 NOTE — XR
EXAMINATION TYPE: XR chest 2V

 

DATE OF EXAM: 1/31/2019

 

COMPARISON: 1/30/2019

 

HISTORY: Post cardiac surgery

 

FINDINGS:

There are bilateral pleural effusions with cardiomegaly and bibasilar infiltrate.  There is a diffuse
 interstitial pattern. Postoperative changes are seen. There is biapical pleural thickening. Arthropa
thy shoulders.

 

IMPRESSION:

1. Postsurgical change with bilateral infiltrate and pleural effusion correlate for CHF.

## 2019-01-31 NOTE — P.PN
Subjective


Progress Note Date: 01/31/19


Principal diagnosis: 





Status post open heart with CABG





This is a pleasant 68-year-old  female patient with past medical 

history the patient does have coronary artery disease with prior coronary 

artery stenting, diabetes type 2, hypertension, and dyslipidemia.who we 

consulted to see for cardiac follow-up after coronary artery bypass grafting.





Earlier this month, January 2019, the patient presented to the hospital with 

shortness of breath.  She was ruled in for acute non-ST deviation myocardial 

infarction with abnormal EKG as well as abnormal cardiac enzymes.  At that 

point she underwent heart catheterization by Dr. Thayer and that revealed severe 

triple-vessel coronary artery disease.  Because of that the patient was 

referred to undergo coronary artery bypass grafting.





The patient underwent yesterday coronary artery bypass grafting 3 where she 

received LIMA to LAD, HUDSON to OM, and radial artery to right coronary artery.





The last echocardiogram from January 2019 revealed impaired LV function with EF 

between 20-25% with basal inferior hypokinesia and inferoseptal hypokinesia.





On follow-up with the patient today, 01/31/2019, the patient has been stable 

clinically.  The hemoglobin is 6.8 and I will suggest giving the patient one 

unit of packed RBC.  The creatinine has improved and is 1.38.  The chest x-ray 

was reviewed and continues to show findings consistent with his CHF and 

bilateral pleural effusion.  The patient might benefit from some Lasix IV.  The 

initial plan for the patient to go to extended care facility later on today.  

She continues to be on dual antiplatelet therapy with aspirin and Plavix as 

well as a statin as well as metoprolol.














Objective





- Vital Signs


Vital signs: 


 Vital Signs











Temp  97.6 F   01/31/19 04:00


 


Pulse  82   01/31/19 07:00


 


Resp  18   01/31/19 07:00


 


BP  116/65   01/31/19 07:00


 


Pulse Ox  97   01/31/19 07:00








 Intake & Output











 01/30/19 01/31/19 01/31/19





 18:59 06:59 18:59


 


Intake Total 140  


 


Output Total 830 0 


 


Balance -690 0 


 


Intake:   


 


  Oral 140  


 


Output:   


 


  Urine 830 0 


 


Other:   


 


  Voiding Method Bedside Commode Bedside Commode 


 


  # Voids 1 1 








 ABP, PAP, CO, CI - Last Documented











Arterial Blood Pressure        102/52


 


Pulmonary Artery Pressure      39/22


 


Cardiac Output                 5.9


 


Cardiac Index                  2.9

















- Constitutional


General appearance: Present: no acute distress





- Respiratory


Respiratory: bilateral: rales





- Cardiovascular


Rhythm: regular


Heart sounds: normal: S1, S2





- Labs


CBC & Chem 7: 


 01/31/19 04:41





 01/31/19 04:41


Labs: 


 Abnormal Lab Results - Last 24 Hours (Table)











  01/30/19 01/30/19 01/30/19 Range/Units





  07:14 11:45 16:49 


 


RBC     (3.80-5.40)  m/uL


 


Hgb     (11.4-16.0)  gm/dL


 


Hct     (34.0-46.0)  %


 


Sodium     (137-145)  mmol/L


 


Potassium     (3.5-5.1)  mmol/L


 


BUN     (7-17)  mg/dL


 


Creatinine     (0.52-1.04)  mg/dL


 


Glucose     (74-99)  mg/dL


 


POC Glucose (mg/dL)  144 H  190 H  221 H  (75-99)  mg/dL


 


Calcium     (8.4-10.2)  mg/dL


 


Magnesium     (1.6-2.3)  mg/dL














  01/30/19 01/31/19 01/31/19 Range/Units





  20:36 02:54 04:41 


 


RBC    2.39 L  (3.80-5.40)  m/uL


 


Hgb    6.8 L*  (11.4-16.0)  gm/dL


 


Hct    21.8 L  (34.0-46.0)  %


 


Sodium     (137-145)  mmol/L


 


Potassium     (3.5-5.1)  mmol/L


 


BUN     (7-17)  mg/dL


 


Creatinine     (0.52-1.04)  mg/dL


 


Glucose     (74-99)  mg/dL


 


POC Glucose (mg/dL)  212 H  118 H   (75-99)  mg/dL


 


Calcium     (8.4-10.2)  mg/dL


 


Magnesium     (1.6-2.3)  mg/dL














  01/31/19 01/31/19 Range/Units





  04:41 06:44 


 


RBC    (3.80-5.40)  m/uL


 


Hgb    (11.4-16.0)  gm/dL


 


Hct    (34.0-46.0)  %


 


Sodium  134 L   (137-145)  mmol/L


 


Potassium  5.3 H   (3.5-5.1)  mmol/L


 


BUN  51 H   (7-17)  mg/dL


 


Creatinine  1.35 H   (0.52-1.04)  mg/dL


 


Glucose  101 H   (74-99)  mg/dL


 


POC Glucose (mg/dL)   113 H  (75-99)  mg/dL


 


Calcium  8.2 L   (8.4-10.2)  mg/dL


 


Magnesium  3.4 H   (1.6-2.3)  mg/dL














Assessment and Plan


Assessment: 





Assessment


#1 severe underlying coronary artery disease as described above


#2 status post coronary artery bypass grafting 3 as described above


#3 diabetes type 2


#4 hypertension


#5 dyslipidemia





Plan


#1 continue the current medical regimen including dual antiplatelet therapy 

along with statin and metoprolol.  


#2 one unit of packed RBC


#3 the patient might benefit from some Lasix IV


#4 follow-up with the patient





Thank you for allowing us participate in her care and we will continue 

following up with the patient

## 2019-01-31 NOTE — PN
PROGRESS NOTE



DATE OF SERVICE:

January 31, 2019



This is a 68-year-old female admitted back on January 24th.  She is status post 3-

vessel bypass grafting.  She is postop day #7.  She is still on 2 L nasal cannula.

Chest x-ray shows ongoing fluid overload.  She would probably benefit from some

additional Lasix.  She is not receiving any IV fluids at this time.  In addition to the

above, she has history of diabetes mellitus, hyperlipidemia, iron deficiency anemia,

peripheral vascular occlusive disease, myocardial infarction and a previous history of

tobacco dependence. She likely has some underlying COPD.  She is doing a bit better on

incentive spirometer.  Getting about 750 to 1 L on the IS.



Vital signs are reviewed.  Temperature is 97.6, heart rate 80, respiratory rate 13,

blood pressure 116/65, mean 82, saturations are 97% on 2 L.  There is no acute

distress.

HEENT examination is grossly unremarkable.  Mucous membranes are moist.  No oral

lesions.  Nasal O2 noted.

NECK: Supple.  Full range of motion.  No adenopathy, thyromegaly or neck vein

distention.

Cardiovascular examination reveals regular rhythm and rate.  S1, S2 normal.  Heart

sounds are distant.  No murmur.

Lungs reveal relatively clear breath sounds.  A few scattered crackles.  No wheezes or

rhonchi.  Breath sounds are equal.

Abdomen is soft.  Bowel sounds are heard.

Extremities are intact.  No cyanosis, clubbing, or edema.

Skin without rash.

Neurologic examination is brief but nonfocal.



Microbiologic studies were negative.



Chest x-ray continues to show fluid overload.  There is cardiomegaly, pleural effusions

and cephalization with some mild interstitial edema.



White count 6.1, hemoglobin 6.8, hematocrit 21.8, platelet count 335,000.  Sodium 134,

potassium 5.3, chloride 106, CO2 of 25, anion gap 3. BUN and creatinine were 51 and

1.35.



Medications are reviewed.



ASSESSMENT:

1. Postoperative day #7, status post 3-vessel bypass grafting.

2. Routine postoperative ventilator management, resolved.

3. Coronary artery disease.

4. Ischemic cardiomyopathy with left ventricular dysfunction.

5. Chronic systolic heart failure.

6. Chronic obstructive pulmonary disease with an FEV1 that is 56% of predicted/stage

    II or moderate chronic obstructive pulmonary disease.

7. Postoperative atelectasis and pleural effusions.

8. History of diabetes mellitus.

9. History of hyperlipidemia.

10.History of iron deficiency anemia.

11.Peripheral vascular occlusive disease.

12.History of myocardial infarction.



PLAN:

The patient should benefit from 1 unit of blood followed by some Lasix.  We will leave

that up to Cardiothoracic Surgery.  No additional recommendations are made.

Respiratory status is stable.  We encouraged deep breathing, coughing, clearing of

secretions and hourly use of incentive spirometer.  No additional recommendations are

made.  We will continue to follow with the discharge.





YAAKOVL / YAIMAN: [id number] / Job#: [id number]

## 2019-01-31 NOTE — P.PN
Subjective


Progress Note Date: 01/31/19


Principal diagnosis: 





Coronary artery disease, chronic systolic and diastolic heart failure with 

preoperative ejection fraction 20-25%, ischemic cardiomyopathy and LV 

dysfunction, history of non-STEMI earlier this month as well as in 2006 with 

stent placement at that time, hyperlipidemia, peripheral artery disease with 

stent placement to the left lower extremity in 2016, chronic kidney disease 

with baseline creatinine 1.3-1.5, recent vein surgery to bilateral lower 

extremity is, non-insulin-dependent diabetes mellitus with preoperative 

hemoglobin A1c 7.1%, shingles, obesity, moderate COPD with preoperative FEV1 51

% of predicted, previous tobacco dependence, and family history of early 

coronary artery disease with mother dying before the age of 60 years old during 

coronary artery bypass graft surgery.  Preoperative nasal swab positive for 

MSSA.  Preoperative normocytic, normochromic anemia.





POD #7 off-pump coronary artery bypass graft surgery 3 with the left internal 

mammary artery artery graft to the left anterior descending artery, right 

radial artery graft to the obtuse marginal and the left radial arterial graft 

to the posterior descending artery, bilateral endovascular radial artery 

harvest.  Intraoperative transesophageal echocardiogram by anesthesia.





Postoperative acute blood loss anemia, an expected outcome.





Acute kidney injury, nonoliguric, potential outcome given patient's baseline 

chronic kidney disease.





Patient is currently sitting up to the bedside chair in the intensive care 

unit.  She is in no acute distress.  She denies any complaints of pain or 

shortness of breath this time.  She reports that she ambulated in the intensive 

care unit all way 3 yesterday from her room to the ICU exit waiting room.  She 

remains hemodynamically stable and is currently on no pressor support.  She 

remains on 3 L nasal cannula with oxygen saturations 96%.  The patient is 

afebrile and her WBC count today is 6.1, BUN is 51 and creatinine is 1.35.





Objective





- Vital Signs


Vital signs: 


 Vital Signs











Temp  98.8 F   01/31/19 08:00


 


Pulse  85   01/31/19 08:00


 


Resp  15   01/31/19 08:00


 


BP  127/65   01/31/19 08:00


 


Pulse Ox  96   01/31/19 08:00








 Intake & Output











 01/30/19 01/31/19 01/31/19





 18:59 06:59 18:59


 


Intake Total 140  240


 


Output Total 830 0 


 


Balance -690 0 240


 


Intake:   


 


  Oral 140  240


 


Output:   


 


  Urine 830 0 


 


Other:   


 


  Voiding Method Bedside Commode Bedside Commode 


 


  # Voids 1 1 








 ABP, PAP, CO, CI - Last Documented











Arterial Blood Pressure        102/52


 


Pulmonary Artery Pressure      39/22


 


Cardiac Output                 5.9


 


Cardiac Index                  2.9

















- Constitutional


General appearance: Present: cooperative, no acute distress, obese





- Respiratory


Details: 





Lung sounds with scattered expiratory wheezes, diminished bilateral bases.  

Respirations are symmetrical and nonlabored.  Oxygen saturation are 96% on 3 L 

nasal cannula.  She is achieving 750 mL on her incentive spirometry.  

Productive cough with thin yellow sputum.





- Cardiovascular


Details: 





Regular rhythm and rate.  S1 and S2 present, negative for S3, gallop or murmur.

  Sternum is stable.  Bedside telemetry showing normal sinus rhythm heart rate 

84.  Knee-high BE hose and sequential compression devices in place to 

bilateral lower extremities.  Heart hugger in place and she is demonstrating 

appropriate use.  Trace edema to her bilateral lower extremities.





- Gastrointestinal


Gastrointestinal Comment(s): 





Abdomen is soft, nontender and nondistended.  Active bowel sounds all 4 

abdominal quadrants.  Tolerating oral intake.  Bowel movement yesterday 3.  No 

guarding or rigidity.  No organomegaly.





- Genitourinary


Genitourinary Comment(s): 





Voiding clear yellow urine.





- Integumentary


Integumentary Comment(s): 





Skin is warm and dry.  No clubbing or cyanosis is present.  Midline sternal 

incision is clean, dry and approximated.  Gauze dressing clean and dry.  No 

drainage or redness present.  Bilateral radial artery harvest sites are clean, 

dry and approximated.  No drainage or redness is present.  Hands are warm to 

touch bilaterally.





- Neurologic


Neurologic: Present: CNII-XII intact





- Musculoskeletal


Musculoskeletal: Present: gait normal, generalized weakness, strength equal 

bilaterally





- Psychiatric


Psychiatric: Present: A&O x's 3, appropriate affect, intact judgment & insight





- Allied health notes


Allied health notes reviewed: nursing





- Labs


CBC & Chem 7: 


 01/31/19 04:41





 01/31/19 04:41


Labs: 


 Abnormal Lab Results - Last 24 Hours (Table)











  01/30/19 01/30/19 01/30/19 Range/Units





  11:45 16:49 20:36 


 


RBC     (3.80-5.40)  m/uL


 


Hgb     (11.4-16.0)  gm/dL


 


Hct     (34.0-46.0)  %


 


Sodium     (137-145)  mmol/L


 


Potassium     (3.5-5.1)  mmol/L


 


BUN     (7-17)  mg/dL


 


Creatinine     (0.52-1.04)  mg/dL


 


Glucose     (74-99)  mg/dL


 


POC Glucose (mg/dL)  190 H  221 H  212 H  (75-99)  mg/dL


 


Calcium     (8.4-10.2)  mg/dL


 


Magnesium     (1.6-2.3)  mg/dL














  01/31/19 01/31/19 01/31/19 Range/Units





  02:54 04:41 04:41 


 


RBC   2.39 L   (3.80-5.40)  m/uL


 


Hgb   6.8 L*   (11.4-16.0)  gm/dL


 


Hct   21.8 L   (34.0-46.0)  %


 


Sodium    134 L  (137-145)  mmol/L


 


Potassium    5.3 H  (3.5-5.1)  mmol/L


 


BUN    51 H  (7-17)  mg/dL


 


Creatinine    1.35 H  (0.52-1.04)  mg/dL


 


Glucose    101 H  (74-99)  mg/dL


 


POC Glucose (mg/dL)  118 H    (75-99)  mg/dL


 


Calcium    8.2 L  (8.4-10.2)  mg/dL


 


Magnesium    3.4 H  (1.6-2.3)  mg/dL














  01/31/19 Range/Units





  06:44 


 


RBC   (3.80-5.40)  m/uL


 


Hgb   (11.4-16.0)  gm/dL


 


Hct   (34.0-46.0)  %


 


Sodium   (137-145)  mmol/L


 


Potassium   (3.5-5.1)  mmol/L


 


BUN   (7-17)  mg/dL


 


Creatinine   (0.52-1.04)  mg/dL


 


Glucose   (74-99)  mg/dL


 


POC Glucose (mg/dL)  113 H  (75-99)  mg/dL


 


Calcium   (8.4-10.2)  mg/dL


 


Magnesium   (1.6-2.3)  mg/dL














- Imaging and Cardiology


Chest x-ray: image reviewed





Assessment and Plan


(1) Status post three vessel coronary artery bypass


Current Visit: Yes   Status: Acute   Code(s): Z95.1 - PRESENCE OF AORTOCORONARY 

BYPASS GRAFT   SNOMED Code(s): 787876049


   





(2) COPD (chronic obstructive pulmonary disease)


Current Visit: Yes   Status: Chronic   Code(s): J44.9 - CHRONIC OBSTRUCTIVE 

PULMONARY DISEASE, UNSPECIFIED   SNOMED Code(s): 29230688


   





(3) Coronary artery disease


Current Visit: Yes   Status: Chronic   Code(s): I25.10 - ATHSCL HEART DISEASE 

OF NATIVE CORONARY ARTERY W/O ANG PCTRS   SNOMED Code(s): 21992450


   





(4) Diabetes mellitus


Current Visit: Yes   Status: Chronic   Code(s): E11.9 - TYPE 2 DIABETES 

MELLITUS WITHOUT COMPLICATIONS   SNOMED Code(s): 69023050


   





(5) History of heart artery stent


Current Visit: Yes   Status: Chronic   Code(s): Z95.5 - PRESENCE OF CORONARY 

ANGIOPLASTY IMPLANT AND GRAFT   SNOMED Code(s): 274308937


   





(6) Hyperlipidemia


Current Visit: Yes   Status: Chronic   Code(s): E78.5 - HYPERLIPIDEMIA, 

UNSPECIFIED   SNOMED Code(s): 58466768


   





(7) Iron deficiency anemia


Current Visit: Yes   Status: Chronic   Code(s): D50.9 - IRON DEFICIENCY ANEMIA, 

UNSPECIFIED   SNOMED Code(s): 80321762


   





(8) Ischemic cardiomyopathy


Current Visit: Yes   Status: Chronic   Code(s): I25.5 - ISCHEMIC CARDIOMYOPATHY

   SNOMED Code(s): 642986513


   





(9) Obesity (BMI 30.0-34.9)


Current Visit: Yes   Status: Chronic   Code(s): E66.9 - OBESITY, UNSPECIFIED   

SNOMED Code(s): 820385268785485


   





(10) Peripheral artery disease


Current Visit: Yes   Status: Chronic   Code(s): I73.9 - PERIPHERAL VASCULAR 

DISEASE, UNSPECIFIED   SNOMED Code(s): 017663969


   





(11) Systolic heart failure


Current Visit: Yes   Status: Chronic   Code(s): I50.20 - UNSPECIFIED SYSTOLIC (

CONGESTIVE) HEART FAILURE   SNOMED Code(s): 636077676


   





(12) Congestive heart failure


Current Visit: No   Status: Acute   Code(s): I50.9 - HEART FAILURE, UNSPECIFIED

   SNOMED Code(s): 43480101


   





(13) History of myocardial infarction, greater than 8 weeks ago


Current Visit: No   Status: Resolved   Code(s): I25.2 - OLD MYOCARDIAL 

INFARCTION   SNOMED Code(s): 0276770


   





(14) Tobacco dependence in remission


Current Visit: No   Status: Resolved   Code(s): F17.201 - NICOTINE DEPENDENCE, 

UNSPECIFIED, IN REMISSION   SNOMED Code(s): 232700729


   


Plan: 





1.  Continue aspirin, statin, Plavix, beta blocker therapy.  Will decrease 

metoprolol tartrate 12.5 mg by mouth twice a day.


2.  Avoid nephrotoxic agents.  Nephrology services following.  Lasix 20 mg IV 

1 now, then Lasix 20 mg by mouth daily 5 days.


3.  Continue oral Cardizem for radial artery spasm.  Do not discontinue.  We 

will change her Cardizem to Cardizem  mg by mouth daily.


4.  Wean O2 as tolerated.  Encourage incentive spirometry is 10 times every 

hour while awake.


5.  Bronchodilator management per pulmonology.


6.  Increase activity as tolerated.  PT/OT/cardiac rehab following.


7.  GI prophylaxis with Protonix, DVT prophylaxis with subcu heparin, SCDs. 


8.  Insulin management per primary care service.


9.  Pain control with current medication regimen.  No Toradol secondary to 

chronic renal insufficiency.


10.  Will monitor daily labs and x-rays.  Electrolyte replacement per protocol.

  Discontinued magnesium replacement protocol due to elevated magnesium. 


11.  Anticipate discharge to inpatient rehab at [facility] today.


12.  More recommendations to follow based on patient's clinical course.


 


Time with Patient: Greater than 30

## 2019-01-31 NOTE — P.PN
Subjective





Patient is seen in follow-up for acute kidney injury.  Unclear as to what her 

baseline renal function is.  Recently her creatinine has been in the range of 

1.2-1.3.  She underwent CABG on January 24.  No active chest pain or shortness 

of breath.  Good urine output.  Oral intake is fair.  Hemoglobin is 6.8 today.  

No active bleeding.





Vital signs are stable.


General: The patient appeared well nourished and normally developed. 


HEENT: Head exam is unremarkable. Neck is without jugular venous distension.


LUNGS: Lungs are clear to auscultation and percussion. Breath sounds decreased.


HEART: Rate and Rhythm are regular. First and second heart sounds normal. No 

murmurs, rubs or gallops. 


ABDOMEN: Abdominal exam reveals normal bowel sounds. Non-tender and non-

distended. No evidence of peritonitis.


EXTREMITITES: 1+ edema.





Objective





- Vital Signs


Vital signs: 


 Vital Signs











Temp  98.8 F   01/31/19 08:00


 


Pulse  85   01/31/19 08:00


 


Resp  15   01/31/19 08:00


 


BP  127/65   01/31/19 08:00


 


Pulse Ox  96   01/31/19 08:00








 Intake & Output











 01/30/19 01/31/19 01/31/19





 18:59 06:59 18:59


 


Intake Total 140  240


 


Output Total 830 0 


 


Balance -690 0 240


 


Intake:   


 


  Oral 140  240


 


Output:   


 


  Urine 830 0 


 


Other:   


 


  Voiding Method Bedside Commode Bedside Commode 


 


  # Voids 1 1 








 ABP, PAP, CO, CI - Last Documented











Arterial Blood Pressure        102/52


 


Pulmonary Artery Pressure      39/22


 


Cardiac Output                 5.9


 


Cardiac Index                  2.9

















- Labs


CBC & Chem 7: 


 01/31/19 04:41





 01/31/19 04:41


Labs: 


 Abnormal Lab Results - Last 24 Hours (Table)











  01/30/19 01/30/19 01/30/19 Range/Units





  11:45 16:49 20:36 


 


RBC     (3.80-5.40)  m/uL


 


Hgb     (11.4-16.0)  gm/dL


 


Hct     (34.0-46.0)  %


 


Sodium     (137-145)  mmol/L


 


Potassium     (3.5-5.1)  mmol/L


 


BUN     (7-17)  mg/dL


 


Creatinine     (0.52-1.04)  mg/dL


 


Glucose     (74-99)  mg/dL


 


POC Glucose (mg/dL)  190 H  221 H  212 H  (75-99)  mg/dL


 


Calcium     (8.4-10.2)  mg/dL


 


Magnesium     (1.6-2.3)  mg/dL














  01/31/19 01/31/19 01/31/19 Range/Units





  02:54 04:41 04:41 


 


RBC   2.39 L   (3.80-5.40)  m/uL


 


Hgb   6.8 L*   (11.4-16.0)  gm/dL


 


Hct   21.8 L   (34.0-46.0)  %


 


Sodium    134 L  (137-145)  mmol/L


 


Potassium    5.3 H  (3.5-5.1)  mmol/L


 


BUN    51 H  (7-17)  mg/dL


 


Creatinine    1.35 H  (0.52-1.04)  mg/dL


 


Glucose    101 H  (74-99)  mg/dL


 


POC Glucose (mg/dL)  118 H    (75-99)  mg/dL


 


Calcium    8.2 L  (8.4-10.2)  mg/dL


 


Magnesium    3.4 H  (1.6-2.3)  mg/dL














  01/31/19 Range/Units





  06:44 


 


RBC   (3.80-5.40)  m/uL


 


Hgb   (11.4-16.0)  gm/dL


 


Hct   (34.0-46.0)  %


 


Sodium   (137-145)  mmol/L


 


Potassium   (3.5-5.1)  mmol/L


 


BUN   (7-17)  mg/dL


 


Creatinine   (0.52-1.04)  mg/dL


 


Glucose   (74-99)  mg/dL


 


POC Glucose (mg/dL)  113 H  (75-99)  mg/dL


 


Calcium   (8.4-10.2)  mg/dL


 


Magnesium   (1.6-2.3)  mg/dL














Assessment and Plan


Plan: 





Assessment:


1.  Acute kidney injury secondary to ATN secondary to hemodynamic instability.  

Creatinine peaked at 2.0 this admission and is down to 1.35 today.


2.  Chronic kidney disease.  UA from earlier this month did reveal 1+ 

proteinuria which is likely secondary to underlying diabetic kidney disease.  

Need to establish baseline renal function.


3.  Coronary artery disease status post CABG on January 24.


4.  Diabetes mellitus.


5.  Hyponatremia secondary to acute kidney injury.  Slightly hypervolemic.


6.  Systolic CHF with ejection fraction of 20-25% on echocardiogram done 01/14/ 2019.


7.  Mild hyperkalemia secondary to acute kidney injury.  No significant 

acidosis.  Blood sugars not too high either.  Stable.


8.  Anemia.  No active bleeding.  Hemoglobin 6.8 today.


9.  Mild volume overload.





Plan:


Recommend 1 unit of blood transfusion today and Lasix 40 mg IV after blood 

transfusion completed.


Potential transfer to inpatient rehab today.

## 2019-01-31 NOTE — P.PN
Subjective





this is a pleasant 67 yo F wiht pmh of CHF and low EF: 20-25% and chronic 

diabetic kidney disease, who is status post CABG on 1/24/19, she developed 

acute kidney injury. she is lying in chair with no chest pain , no dyspnea, no 

abdominal pain , no nausea or vomiting . she is hemodynamically stable with no 

fever. her hemoglobin is 7.1 which is expected after surgery and is put on iron 

pill.her creatinine is 1.5 , baseline around 1.2-1.3, it went up to 2.0 and now 

is improving , pt is been followed by nephrology team , also has mild 

hyponatremia. sugar is controlled. 


pt is planed to go to ECF upon discharge.





01/30/2019


Patient is clinically stable, same as yesterday with no dyspnea or chest pain.  

Tolerating diet well.  Vitals stable ..  Labs reviewed showing unremarkable 

CBC.  Her sodium is 132, potassium 5.43 creatinine 1.5.  Magnesium 3.7.  

Calcium 8.1.  Sugar is controlled.  Repeat chest x-ray showing pulmonary venous 

hypertension and interstitial edema with probable small effusion and there is 

likely associated atelectasis changes and pneumonia not excluded per radiology 

report.  However patient has no worsening symptoms.  Patient is planned to be 

discharged to [facility] rehab.  Patient insurance authorization is pending.





01/31/2019


Patient was sitting in chair with no chest pain or dyspnea.  No abdominal 

complaint.  No nausea vomiting.  Afebrile.  Blood pressure 114/58.  Heart rate 

90.  Breathing quietly , some oxygen saturation 95% on 2 L oxygen when 

necessary NC.  Hemoglobin today is 6.8, patient might benefit from blood 

transfusion one unit to bring her hemoglobin up as per primary team, currently 

she is on iron pills.  Potassium 5.3.  Sodium 134.  Creatinine 1.3, which is 

improved from 1.5 yesterday and close to her known baseline.  Sugar controlled.

  She is on Cardizem, metoprolol, iron pills.  And insulin.  Patient expected 

to go to [facility] rehab upon discharge





Objective





- Vital Signs


Vital signs: 


 Vital Signs











Temp  98.8 F   01/31/19 08:00


 


Pulse  90   01/31/19 09:00


 


Resp  16   01/31/19 09:00


 


BP  114/58   01/31/19 09:00


 


Pulse Ox  95   01/31/19 09:00








 Intake & Output











 01/30/19 01/31/19 01/31/19





 18:59 06:59 18:59


 


Intake Total 140  240


 


Output Total 830 0 


 


Balance -690 0 240


 


Intake:   


 


  Oral 140  240


 


Output:   


 


  Urine 830 0 


 


Other:   


 


  Voiding Method Bedside Commode Bedside Commode 


 


  # Voids 1 1 








 ABP, PAP, CO, CI - Last Documented











Arterial Blood Pressure        102/52


 


Pulmonary Artery Pressure      39/22


 


Cardiac Output                 5.9


 


Cardiac Index                  2.9

















- Exam





GENERAL: The patient is alert and oriented x3, not in any acute distress. Well 

developed, well nourished. 


HEENT: Pupils are round and equally reacting to light. EOMI. No scleral 

icterus. No conjunctival pallor. Normocephalic, atraumatic. No pharyngeal 

erythema. No thyromegaly. 


CARDIOVASCULAR: S1 and S2 present. No murmurs, rubs, or gallops. 


PULMONARY: Chest is clear to auscultation, no wheezing or crackles. 


ABDOMEN: Soft, nontender, nondistended, normoactive bowel sounds. No palpable 

organomegaly.  MUSCULOSKELETAL: No joint swelling or deformity.


EXTREMITIES: No cyanosis, clubbing, or pedal edema. 


NEUROLOGICAL: Gross neurological examination did not reveal any focal deficits. 


SKIN: No rashes.





- Labs


CBC & Chem 7: 


 01/31/19 04:41





 01/31/19 04:41


Labs: 


 Abnormal Lab Results - Last 24 Hours (Table)











  01/30/19 01/30/19 01/30/19 Range/Units





  11:45 16:49 20:36 


 


RBC     (3.80-5.40)  m/uL


 


Hgb     (11.4-16.0)  gm/dL


 


Hct     (34.0-46.0)  %


 


Sodium     (137-145)  mmol/L


 


Potassium     (3.5-5.1)  mmol/L


 


BUN     (7-17)  mg/dL


 


Creatinine     (0.52-1.04)  mg/dL


 


Glucose     (74-99)  mg/dL


 


POC Glucose (mg/dL)  190 H  221 H  212 H  (75-99)  mg/dL


 


Calcium     (8.4-10.2)  mg/dL


 


Magnesium     (1.6-2.3)  mg/dL














  01/31/19 01/31/19 01/31/19 Range/Units





  02:54 04:41 04:41 


 


RBC   2.39 L   (3.80-5.40)  m/uL


 


Hgb   6.8 L*   (11.4-16.0)  gm/dL


 


Hct   21.8 L   (34.0-46.0)  %


 


Sodium    134 L  (137-145)  mmol/L


 


Potassium    5.3 H  (3.5-5.1)  mmol/L


 


BUN    51 H  (7-17)  mg/dL


 


Creatinine    1.35 H  (0.52-1.04)  mg/dL


 


Glucose    101 H  (74-99)  mg/dL


 


POC Glucose (mg/dL)  118 H    (75-99)  mg/dL


 


Calcium    8.2 L  (8.4-10.2)  mg/dL


 


Magnesium    3.4 H  (1.6-2.3)  mg/dL














  01/31/19 Range/Units





  06:44 


 


RBC   (3.80-5.40)  m/uL


 


Hgb   (11.4-16.0)  gm/dL


 


Hct   (34.0-46.0)  %


 


Sodium   (137-145)  mmol/L


 


Potassium   (3.5-5.1)  mmol/L


 


BUN   (7-17)  mg/dL


 


Creatinine   (0.52-1.04)  mg/dL


 


Glucose   (74-99)  mg/dL


 


POC Glucose (mg/dL)  113 H  (75-99)  mg/dL


 


Calcium   (8.4-10.2)  mg/dL


 


Magnesium   (1.6-2.3)  mg/dL














Assessment and Plan


Assessment: 





coronary artery diseases status post CABG


acute kidney injury 


chronic kidney disease secondary to diabetic nephropathy


chronic congestive heart failure with EF 20-2%


Anemia, expected postoperatively.  Monitor hemoglobin closely.  Recommend to 

give the level above the seventh


Plan: 





this is a pleasatn 67 yo F , she is status post CABG and acute kidney injury, 

pt is improving . continue with statin , plavix , insulin


continue with the same treatment , continue with symptomatic treatment , resume 

home medication , monitor lytes and vitals including glucose , c/w iv fluids, 

cardiology consult is appreciated. infectious disease consult is appreciated . c

/w same antibioitc . GI and DVT prophylaxis , further recommendation based upon 

pt clinical course and progress


DVT prophylaxis subcutaneous heparin


GI prophylaxis Protonix


PT/OT: Subacute rehab


Prognosis is guarded





Thank you for consulting us, please feel free to contact us for any further 

clarification or questions.

## 2019-01-31 NOTE — P.DS
Providers


Date of admission: 


01/24/19 05:48





Expected date of discharge: 01/31/19


Attending physician: 


Igro Hoskins





Consults: 





 





01/24/19 14:43


Consult Physician Routine 


   Consulting Provider: Samson Oliver


   Consult Reason/Comments: Intensivist Consult: post cardiac surgery


   Do you want consulting provider notified?: Yes


   Placement Type Exists?: Yes


Consult Physician Routine 


   Consulting Provider: Kumar King


   Consult Reason/Comments: Angelo caruso patient


   Do you want consulting provider notified?: Already Contacted


   Placement Type Exists?: Yes


Consult Physician Routine 


   Consulting Provider: Orlando Tomas


   Consult Reason/Comments: Cardiologist Consult: post cardiac surgery


   Do you want consulting provider notified?: Yes


   Placement Type Exists?: Yes





01/28/19 08:41


Consult Physician Routine 


   Consulting Provider: Eva Gonzalez


   Consult Reason/Comments: acute on chronic renal failure


   Do you want consulting provider notified?: Yes





01/29/19 11:52


Consult Physician Routine 


   Consulting Provider: Chad Gallego


   Consult Reason/Comments: inpatient rehab


   Do you want consulting provider notified?: Yes











Primary care physician: 


Rashad Stephens








- Discharge Diagnosis(es)


(1) Status post three vessel coronary artery bypass


Current Visit: Yes   Status: Acute   





(2) COPD (chronic obstructive pulmonary disease)


Current Visit: Yes   Status: Chronic   





(3) Coronary artery disease


Current Visit: Yes   Status: Chronic   





(4) Diabetes mellitus


Current Visit: Yes   Status: Chronic   





(5) History of heart artery stent


Current Visit: Yes   Status: Chronic   





(6) Hyperlipidemia


Current Visit: Yes   Status: Chronic   





(7) Iron deficiency anemia


Current Visit: Yes   Status: Chronic   





(8) Ischemic cardiomyopathy


Current Visit: Yes   Status: Chronic   





(9) Obesity (BMI 30.0-34.9)


Current Visit: Yes   Status: Chronic   





(10) Peripheral artery disease


Current Visit: Yes   Status: Chronic   





(11) Systolic heart failure


Current Visit: Yes   Status: Chronic   





(12) Congestive heart failure


Current Visit: No   Status: Acute   





(13) History of myocardial infarction, greater than 8 weeks ago


Current Visit: No   Status: Resolved   





(14) Tobacco dependence in remission


Current Visit: No   Status: Resolved   


Hospital Course: 





FINAL DIAGNOSIS: 


1.  Coronary artery disease


2.  Chronic systolic and diastolic heart failure with preoperative ejection 

fraction 20-25%, ischemic cardiomyopathy and LV dysfunction


3.  History of non-STEMI January 2019 as well as in 2006 with stent placement 


4.  Hyperlipidemia


5.  Peripheral artery disease with stent placement to the left lower extremity 

2016


6.  Chronic kidney disease with baseline creatinine 1.3-1.5


7.  Recent vein surgery to bilateral lower extremities


8.  Non-insulin-dependent diabetes mellitus with preoperative hemoglobin A1c 7.1

%


9.  Shingles


10.  Obesity


11.  Moderate COPD with preoperative FEV1 51% of predicted


12.  Previous tobacco dependence


13.  Family history of early coronary artery disease


14.  Preoperative nasal swab positive for MSSA


15.  Preoperative normocytic, normochromic anemia


16.  Postoperative acute blood loss anemia


17.  Acute kidney injury, nonoliguric





PRINCIPAL PROCEDURE: 


1.  Off-pump coronary artery bypass graft surgery 3 with the left internal 

mammary artery graft to the left anterior descending artery, right radial 

artery graft to the obtuse marginal and the left radial arterial graft to the 

posterior descending artery


2.  Bilateral endovascular radial artery harvest


3.  Intraoperative transesophageal echocardiogram by anesthesia





HISTORY OF PRESENT ILLNESS: This is a 68-year-old female patient who follows on 

an outpatient basis with Dr. Luisa Stephens.  She presented to [facility] emergency room with complaints of increasing shortness of breath, 

especially with activity.  She was unable to lay flat, and was significantly 

short of breath just walking from her kitchen to her bathroom.  Her dyspnea was 

partially relieved with rest.  Other symptoms at that time were bilateral lower 

extremity edema, intermittent nausea, and occasional palpitations.  She did 

denied any chest pain or pressure, weakness or dizziness.  In the emergency 

room she had an 12 lead EKG completed demonstrating nonspecific ST-T wave 

changes, her troponins were elevated and she was ruled in for non-STEMI.  Chest 

x-ray demonstrated bilateral pleural effusions with pulmonary vascular 

congestion and she was admitted for cardiology workup.  She was taken to the 

Cath Lab by Dr. Tomas which demonstrated previous stent to the RCA with RCA 

stenosis 80-90% and distal RCA stenosis 70%, subtotal occlusion of the mid LAD 

with extensive disease in the proximal LAD, and OM branch of the circumflex 

artery with 60-70% stenosis.  The patient was referred to Dr. Nielsen from 

cardiothoracic surgery.  She was recommended to undergo an off-pump coronary 

artery bypass surgery to be completed by Dr. Igor Hoskins.  The usual 

perioperative course was discussed in detail with the patient and her family, 

all risks and benefits were explained, all questions were answered, and consent 

was obtained to proceed with surgery.  The patient was discharged home to allow 

for optimization of medical therapy.





HOSPITAL COURSE: The patient was brought to the hospital on 1/24/2019, taken to 

the preoperative area, prepared in the usual fashion and subsequently taken to 

the operating room where Dr. Hoskins performed off-pump coronary artery bypass 

graft surgery 3 with the left internal mammary artery graft to the left 

anterior descending artery, right radial artery graft to the obtuse marginal 

and the left radial arterial graft to the posterior descending artery, 

bilateral endovascular radial artery harvest, and intraoperative 

transesophageal echocardiogram by anesthesia. Upon completion of surgery the 

patient was transferred to the cardiovascular intensive care unit where she was 

recovered, monitored hemodynamically and where she progressed to cardiac 

rehabilitation phase 1.  She was extubated, all lines, tubes and supportive 

drips were discontinued when appropriate and orders were placed to transfer the 

patient to [address] [place] [address] cardiac stepdown unit for further monitoring and 

rehabilitation, however there was no bed availability and the patient was kept 

in the intensive care unit until discharge.  Her oxygen was titrated down, she 

continued to work with physical and occupational therapy, she was tolerating 

oral diet, her pain was controlled, and she was ready to be discharged to [facility] inpatient rehab on postoperative day #7.  She received 

written and verbal instruction regarding her medications, activity restrictions

, signs and symptoms requiring physician notification, and follow-up 

appointments.





COMPLICATIONS: The patient experienced postoperative acute blood loss anemia 

and acute kidney injury, both an expected potential outcomes, and both were 

treated accordingly.





Consultations at [facility] inpatient rehab:


Dr. Tomas for cardiology


Dr. Oliver for pulmonology


Dr. Salazar for nephrology





DISCHARGE INSTRUCTIONS: 


1.  No driving for 4 weeks, or until physician gives their ok.


2.  The patient should sleep in their own bed, no medical bed needed.


3.  Stairs are not an issue.  If the bedroom is upstairs, it is advised that 

the patient go up at night and down in the morning for the first week.  Go 

slowly, using handrail and take 1 step at a time.


4.  BE hose are to be worn for 30 days or until physician discontinues.


5.  Heart hugger is to be worn 100% of the time until physician discontinues.(

except when showering)


6.  No lifting, pushing, or pulling more than 10 pounds for 12 weeks.  The 

physician will advise of any restriction changes.


7.  The patient is expected to continue the prescribed walking program.


8.  Continue pain control per as needed orders.


9.  Continue with incentive spirometry and splinting/heart hugger until 

otherwise directed by the physician.


10. Must shower daily using liquid antibacterial soap and a separate white 

washcloth for each individual incision.


11. Routine sternal incision care. No powders, lotions, ointments on incisions.


12. Please call surgeon/NP for temp greater than 101 F or purulent drainage 

from incisions.


13. Narcotic medications were discussed with the patient, including the 

potential for misuse, addiction, and abuse. Opiod Start Talking form was 

reviewed with the patient.


14. All prescriptions given by surgeon for 30 days.  Refills need to be filled 

through cardiologist/primary care physician.


15. A Red armband has been placed on the patient.  It should be worn for 30 

days post surgery and will be removed by the cardiac surgeons.  If an ER visit 

is necessary, please make sure the number on the Red armband is called.


16.  The patient will not be discharged on an ACE inhibitor due to her renal 

function and will be reassessed on an outpatient basis once her renal function 

has improved.





[facility] IPR/HOME HEALTH SERVICES TO PROVIDE:  


   RN SKILLED HOME CARE SERVICES FOR POST-OP SURGICAL PATIENTS WITH THE 

FOLLOWING: Coronary Artery Bypass Surgery (CABG), Mitral Valve Replacement/

Repair ( MVR), Aortic Valve Replacement/Repair (AVR)


   RN TO CONTINUE EDUCATION FROM ``ROAD TO A HEALTH HEART PATIENT EDUCATION 

MANUAL (GIVEN TO PATIENT IN THE HOSPITAL)


   MEDICATION RECONCILIATION WITH EDUCATION AS NEEDED ON FIRST HOME VISIT


   EMPHASIZE IMPORTANCE OF WEARING BREAST SUPPORT/HEART HUGGER


   ENCOURAGE USE OF INCENTIVE SPIROMETER 10 X EVERY HOUR WHILE AWAKE


   ENCOURAGE UTILIZATION OF LOWER EXTREMITY COMPRESSION STOCKINGS/BE HOSE and 

ELEVATE LEGS ABOVE LEVEL OF HEART WHILE AT REST.  


   ENCOURAGE AMBULATION 3-5x/day  INCREASING AS TOLERATES, WHILE AVOID 

EXTREMES IN TEMPERATURE





FREQUENCY: RN TO OPEN THE PATIENT WITHIN 24 HOURS OF DISCHARGE FROM INPATIENT 

REHAB WITH TELEHEALTH INSTALLED AT [facility], RN TO VISIT 2-3 X A WEEK FOR 4 WEEKS AS 

ESTABLISHED BY PATIENT NEEDS.





LABORATORY:


   CBC, CMP TO BE DRAWN ON THE THIRD DAY HOME, Sunday, 02/03/2019.  FAX 

RESULTS -[phone].


               


TELEHEALTH PARAMETERS:


   WEIGHT: NOTIFY MD OF WEIGHT GAIN OF 2 LBS IN 24 HOURS OR 5 LBS IN ONE WEEK


   HR: NOTIFY MD OF HR <55 BPM OR HR>100 BPM


   BP: NOTIFY MD IF BP <90/55 OR BP>140/100


   O2 SAT: NOTIFY MD IF PO2<93% ON ROOM AIR





SEND TELEHEALTH REPORT TO CARDIOLOGIST AND CARDIOVASCULAR SURGEON THE FIRST 

WEEK OF CARE AND THEN BI-WEEKLY.  PLEASE ADDITIONALLY COMMUNICATE ANY ABNORMALS 

AND NEW FINDINGS TO THE SURGEONS OFFICE.





Plan - Discharge Summary


Discharge Rx Participant: Yes


New Discharge Prescriptions: 


New


   Acetaminophen Tab [Tylenol] 325 mg PO Q4HR PRN  tab


     PRN Reason: Fever And/ Or Pain


   Heparin Sodium,Porcine [Heparin Sodium] 5,000 unit SQ Q8HR  vial


   Metoprolol Tartrate [Lopressor] 25 mg PO Q8HR  tab


   Ascorbic Acid [Vitamin C] 500 mg PO BID-W/MEALS  tab


   Aspirin 325 mg PO DAILY  tab


   Atorvastatin [Lipitor] 40 mg PO DAILY  tab


   Bisacodyl [Dulcolax] 10 mg RECTAL DAILY PRN  supp


     PRN Reason: Constipation


   Clopidogrel [Plavix] 75 mg PO DAILY  tab


   Diltiazem Cd [Cardizem CD] 120 mg PO DAILY  cap.er.24h


   Ferrous Sulfate [Iron (65 MG Elemental)] 325 mg PO BID-W/MEALS  tab


   Furosemide [Lasix] 20 mg PO DAILY 5 Days  tab


   Insulin Aspart [NovoLOG (formulary)] 0 unit SQ ACHS  vial


   Insulin Detemir [Levemir] 14 unit SQ HS  syr


   Ipratropium-Albuterol Nebulize [Duoneb 0.5 mg-3 mg/3 ml Soln] 3 ml 

INHALATION RT-QID  ampul.neb


   Ipratropium-Albuterol Nebulize [Duoneb 0.5 mg-3 mg/3 ml Soln] 3 ml 

INHALATION RT-Q2H PRN  ampul.neb


     PRN Reason: Shortness Of Breath Or Wheezing


   Pantoprazole [Protonix] 40 mg PO AC-BRKFST  tablet.


   Sennosides-Docusate Sodium [Senokot-S] 2 each PO HS PRN  tab


     PRN Reason: Constipation





Discontinued


   metFORMIN HCL 1,000 mg PO BID


   Lisinopril [Zestril] 5 mg PO DAILY


   Aspirin EC [Ecotrin] 325 mg PO DAILY


   Glimepiride [Amaryl] 2 mg PO DAILY


   Oxybutynin Chloride [Oxybutynin Chloride ER] 10 mg PO DAILY


   Atorvastatin [Lipitor] 40 mg PO DAILY #30 tab


   Metoprolol Tartrate [Lopressor] 50 mg PO BID #60 tab


   Nitroglycerin Sl Tabs [Nitrostat] 0.4 mg SUBLINGUAL Q5M PRN #25 tab


     PRN Reason: Chest Pain


   Spironolactone [Aldactone] 12.5 mg PO DAILY #30 tab


Discharge Medication List





Acetaminophen Tab [Tylenol] 325 mg PO Q4HR PRN  tab 01/31/19 [Rx]


Ascorbic Acid [Vitamin C] 500 mg PO BID-W/MEALS  tab 01/31/19 [Rx]


Aspirin 325 mg PO DAILY  tab 01/31/19 [Rx]


Atorvastatin [Lipitor] 40 mg PO DAILY  tab 01/31/19 [Rx]


Bisacodyl [Dulcolax] 10 mg RECTAL DAILY PRN  supp 01/31/19 [Rx]


Clopidogrel [Plavix] 75 mg PO DAILY  tab 01/31/19 [Rx]


Diltiazem Cd [Cardizem CD] 120 mg PO DAILY  cap.er.24h 01/31/19 [Rx]


Ferrous Sulfate [Iron (65 MG Elemental)] 325 mg PO BID-W/MEALS  tab 01/31/19 [Rx

]


Furosemide [Lasix] 20 mg PO DAILY 5 Days  tab 01/31/19 [Rx]


Heparin Sodium,Porcine [Heparin Sodium] 5,000 unit SQ Q8HR  vial 01/31/19 [Rx]


Insulin Aspart [NovoLOG (formulary)] 0 unit SQ ACHS  vial 01/31/19 [Rx]


Insulin Detemir [Levemir] 14 unit SQ HS  syr 01/31/19 [Rx]


Ipratropium-Albuterol Nebulize [Duoneb 0.5 mg-3 mg/3 ml Soln] 3 ml INHALATION RT

-Q2H PRN  ampul.neb 01/31/19 [Rx]


Ipratropium-Albuterol Nebulize [Duoneb 0.5 mg-3 mg/3 ml Soln] 3 ml INHALATION RT

-QID  ampul.neb 01/31/19 [Rx]


Metoprolol Tartrate [Lopressor] 25 mg PO Q8HR  tab 01/31/19 [Rx]


Pantoprazole [Protonix] 40 mg PO AC-BRKFST  tablet. 01/31/19 [Rx]


Sennosides-Docusate Sodium [Senokot-S] 2 each PO HS PRN  tab 01/31/19 [Rx]








Follow up Appointment(s)/Referral(s): 


Samson Oliver MD [STAFF PHYSICIAN] - 2 Weeks (Follow up appointment to be made 

upon discharge from [facility] inpatient rehab)


Igor Hoskins MD [STAFF PHYSICIAN] - 02/21/19 1:00 pm


Rashad Stephens DO [Primary Care Provider] - 1 Week (Follow up appointment to 

be made upon discharge from [facility] inpatient rehab)


Jerry Salazar DO [STAFF PHYSICIAN] - 2 Weeks (Follow up appointment to be 

made upon discharge from [facility] inpatient rehab)


Orlando Tomas MD [STAFF PHYSICIAN] - 2 Weeks (Follow up appointment to be 

made upon discharge from [facility] inpatient rehab)


Activity/Diet/Wound Care/Special Instructions: 


Consultations at [facility] inpatient rehab:


Dr. Tomas for cardiology


Dr. Oliver for pulmonology


Dr. Salazar for nephrology





DISCHARGE INSTRUCTIONS: 


1.  No driving for 4 weeks, or until physician gives their ok.


2.  The patient should sleep in their own bed, no medical bed needed.


3.  Stairs are not an issue.  If the bedroom is upstairs, it is advised that 

the patient go up at night and down in the morning for the first week.  Go 

slowly, using handrail and take 1 step at a time.


4.  BE hose are to be worn for 30 days or until physician discontinues.


5.  Heart hugger is to be worn 100% of the time until physician discontinues.(

except when showering)


6.  No lifting, pushing, or pulling more than 10 pounds for 12 weeks.  The 

physician will advise of any restriction changes.


7.  The patient is expected to continue the prescribed walking program.


8.  Continue pain control per as needed orders.


9.  Continue with incentive spirometry and splinting/heart hugger until 

otherwise directed by the physician.


10. Must shower daily using liquid antibacterial soap and a separate white 

washcloth for each individual incision.


11. Routine sternal incision care. No powders, lotions, ointments on incisions.


12. Please call surgeon/NP for temp greater than 101 F or purulent drainage 

from incisions.


13. Narcotic medications were discussed with the patient, including the 

potential for misuse, addiction, and abuse. Opiod Start Talking form was 

reviewed with the patient.


14. All prescriptions given by surgeon for 30 days.  Refills need to be filled 

through cardiologist/primary care physician.


15. A Red armband has been placed on the patient.  It should be worn for 30 

days post surgery and will be removed by the cardiac surgeons.  If an ER visit 

is necessary, please make sure the number on the Red armband is called.


16.  The patient will not be discharged on an ACE inhibitor due to her renal 

function and will be reassessed on an outpatient basis once her renal function 

has improved.





[facility] IPR/HOME HEALTH SERVICES TO PROVIDE:  


   RN SKILLED HOME CARE SERVICES FOR POST-OP SURGICAL PATIENTS WITH THE 

FOLLOWING: Coronary Artery Bypass Surgery (CABG), Mitral Valve Replacement/

Repair ( MVR), Aortic Valve Replacement/Repair (AVR)


   RN TO CONTINUE EDUCATION FROM ``ROAD TO A HEALTH HEART PATIENT EDUCATION 

MANUAL (GIVEN TO PATIENT IN THE HOSPITAL)


   MEDICATION RECONCILIATION WITH EDUCATION AS NEEDED ON FIRST HOME VISIT


   EMPHASIZE IMPORTANCE OF WEARING BREAST SUPPORT/HEART HUGGER


   ENCOURAGE USE OF INCENTIVE SPIROMETER 10 X EVERY HOUR WHILE AWAKE


   ENCOURAGE UTILIZATION OF LOWER EXTREMITY COMPRESSION STOCKINGS/BE HOSE and 

ELEVATE LEGS ABOVE LEVEL OF HEART WHILE AT REST.  


   ENCOURAGE AMBULATION 3-5x/day  INCREASING AS TOLERATES, WHILE AVOID 

EXTREMES IN TEMPERATURE





FREQUENCY: RN TO OPEN THE PATIENT WITHIN 24 HOURS OF DISCHARGE FROM INPATIENT 

REHAB WITH TELEHEALTH INSTALLED AT [facility], RN TO VISIT 2-3 X A WEEK FOR 4 WEEKS AS 

ESTABLISHED BY PATIENT NEEDS.





LABORATORY:


   CBC, CMP TO BE DRAWN ON THE THIRD DAY HOME,  (RAN AS STAT) FAX RESULTS TO 

[phone].


               


TELEHEALTH PARAMETERS:


   WEIGHT: NOTIFY MD OF WEIGHT GAIN OF 2 LBS IN 24 HOURS OR 5 LBS IN ONE WEEK


   HR: NOTIFY MD OF HR <55 BPM OR HR>100 BPM


   BP: NOTIFY MD IF BP <90/55 OR BP>140/100


   O2 SAT: NOTIFY MD IF PO2<93% ON ROOM AIR





SEND TELEHEALTH REPORT TO CARDIOLOGIST AND CARDIOVASCULAR SURGEON THE FIRST 

WEEK OF CARE AND THEN BI-WEEKLY.  PLEASE ADDITIONALLY COMMUNICATE ANY ABNORMALS 

AND NEW FINDINGS TO THE SURGEONS OFFICE.

## 2019-08-21 ENCOUNTER — HOSPITAL ENCOUNTER (OUTPATIENT)
Dept: HOSPITAL 47 - RADMAMWWP | Age: 69
Discharge: HOME | End: 2019-08-21
Attending: FAMILY MEDICINE
Payer: MEDICARE

## 2019-08-21 DIAGNOSIS — Z12.31: Primary | ICD-10-CM

## 2019-08-21 PROCEDURE — 77067 SCR MAMMO BI INCL CAD: CPT

## 2019-08-21 PROCEDURE — 77063 BREAST TOMOSYNTHESIS BI: CPT

## 2019-08-26 NOTE — MM
Reason for exam: screening  (asymptomatic).

Last mammogram was performed 3 years and 6 months ago.



History:

Patient is postmenopausal.

Family history of breast cancer in sister at age 40.

Benign excisional biopsy of the left breast, 1994.



Physical Findings:

A clinical breast exam by your physician is recommended on an annual basis and 

results should be correlated with mammographic findings.



MG 3D Screening Mammo W/Cad

Bilateral CC and MLO view(s) were taken.

Prior study comparison: March 2, 2016, mammogram, performed at [place].  January 25, 2013, mammogram, performed at [facility].  January 3, 2013, mammogram, performed 

at [facility].

The breast tissue is heterogeneously dense. This may lower the sensitivity of 

mammography.  Benign appearing bilateral calcifications. There is no discrete 

abnormality.  No significant changes when compared with prior studies.





ASSESSMENT: Benign, BI-RAD 2



RECOMMENDATION:

Routine screening mammogram of both breasts in 1 year.
